# Patient Record
Sex: FEMALE | Race: WHITE | Employment: OTHER | URBAN - METROPOLITAN AREA
[De-identification: names, ages, dates, MRNs, and addresses within clinical notes are randomized per-mention and may not be internally consistent; named-entity substitution may affect disease eponyms.]

---

## 2017-07-25 ENCOUNTER — HOSPITAL ENCOUNTER (OUTPATIENT)
Dept: NON INVASIVE DIAGNOSTICS | Age: 47
Discharge: HOME | End: 2017-07-25
Attending: FAMILY MEDICINE | Admitting: FAMILY MEDICINE

## 2017-07-25 DIAGNOSIS — R06.01 ORTHOPNEA: ICD-10-CM

## 2017-07-25 LAB
LV EF: 58 %
LVEF MODALITY: NORMAL

## 2017-08-01 ENCOUNTER — HOSPITAL ENCOUNTER (OUTPATIENT)
Dept: CT IMAGING | Age: 47
Discharge: HOME | End: 2017-08-01
Attending: FAMILY MEDICINE | Admitting: FAMILY MEDICINE

## 2017-08-01 DIAGNOSIS — R22.0 LOCALIZED SWELLING, MASS, AND LUMP OF HEAD: ICD-10-CM

## 2017-08-11 PROBLEM — R59.0 CERVICAL LYMPHADENOPATHY: Status: ACTIVE | Noted: 2017-08-11

## 2017-08-31 ENCOUNTER — HOSPITAL ENCOUNTER (OUTPATIENT)
Dept: CT IMAGING | Age: 47
Discharge: HOME | End: 2017-08-31
Attending: INTERNAL MEDICINE | Admitting: INTERNAL MEDICINE

## 2017-08-31 DIAGNOSIS — R59.0 LOCALIZED ENLARGED LYMPH NODES: ICD-10-CM

## 2017-09-07 ENCOUNTER — HOSPITAL ENCOUNTER (OUTPATIENT)
Dept: NEUROLOGY | Age: 47
Discharge: HOME | End: 2017-09-07

## 2017-09-07 DIAGNOSIS — R68.89 OTHER ABNORMAL CLINICAL FINDING: ICD-10-CM

## 2017-09-07 DIAGNOSIS — S09.90XA HEAD INJURY, UNSPECIFIED: ICD-10-CM

## 2017-09-07 DIAGNOSIS — R68.89 OTHER GENERAL SYMPTOMS AND SIGNS: ICD-10-CM

## 2017-09-14 ENCOUNTER — HOSPITAL ENCOUNTER (OUTPATIENT)
Dept: CT IMAGING | Age: 47
Discharge: HOME | End: 2017-09-14
Attending: INTERNAL MEDICINE | Admitting: INTERNAL MEDICINE

## 2017-09-14 DIAGNOSIS — R59.0 LOCALIZED ENLARGED LYMPH NODES: ICD-10-CM

## 2017-09-19 ENCOUNTER — TELEPHONE (OUTPATIENT)
Dept: PULMONOLOGY | Age: 47
End: 2017-09-19

## 2017-09-26 ENCOUNTER — HOSPITAL ENCOUNTER (OUTPATIENT)
Dept: CT IMAGING | Age: 47
Discharge: HOME | End: 2017-09-26
Attending: INTERNAL MEDICINE | Admitting: INTERNAL MEDICINE

## 2017-09-26 DIAGNOSIS — R59.0 LOCALIZED ENLARGED LYMPH NODES: ICD-10-CM

## 2017-09-26 DIAGNOSIS — R59.0 CERVICAL LYMPHADENOPATHY: ICD-10-CM

## 2017-10-03 ENCOUNTER — TELEPHONE (OUTPATIENT)
Dept: PULMONOLOGY | Age: 47
End: 2017-10-03

## 2017-10-03 DIAGNOSIS — R91.1 PULMONARY NODULE: Primary | ICD-10-CM

## 2017-10-10 ENCOUNTER — HOSPITAL ENCOUNTER (OUTPATIENT)
Dept: OTHER | Age: 47
Discharge: HOME | End: 2017-10-10
Attending: INTERNAL MEDICINE | Admitting: INTERNAL MEDICINE

## 2017-10-10 NOTE — TELEPHONE ENCOUNTER
PET scan was cancelled and ynes for next Tues 10/17/17 d/t patient drank coffee. She is to keep her appt. Watch for PET results.

## 2017-10-12 ENCOUNTER — OFFICE VISIT (OUTPATIENT)
Dept: PULMONOLOGY | Age: 47
End: 2017-10-12

## 2017-10-12 DIAGNOSIS — R91.1 LUNG NODULE: Primary | ICD-10-CM

## 2017-10-12 DIAGNOSIS — Z72.0 TOBACCO ABUSE: ICD-10-CM

## 2017-10-12 DIAGNOSIS — J43.9 PULMONARY EMPHYSEMA, UNSPECIFIED EMPHYSEMA TYPE (HCC): ICD-10-CM

## 2017-10-12 PROCEDURE — 99245 OFF/OP CONSLTJ NEW/EST HI 55: CPT | Performed by: INTERNAL MEDICINE

## 2017-10-12 NOTE — PROGRESS NOTES
mouth daily, Disp: , Rfl:     naproxen (NAPROSYN) 500 MG tablet, Take 1 tablet by mouth 2 times daily (with meals) for 10 days, Disp: 20 tablet, Rfl: 0    Objective:   PHYSICAL EXAM:     VITALS:  /68 (Site: Left Arm, Position: Sitting, Cuff Size: Medium Adult)   Pulse 88   Temp 98.5 °F (36.9 °C) (Oral)   Resp 22 Comment: 24  Ht 5' 7\" (1.702 m)   Wt 259 lb (117.5 kg)   SpO2 96% Comment: on room air  BMI 40.57 kg/m²   Constitutional: In no acute distress. Appears stated age. Eyes: PERRL. No sclera icterus. No conjunctival injection. ENT: Oropharynx clear. Neck: Trachea midline. No thyroid tenderness. Lymph: No cervical LAD. No supraclavicular LAD. Resp: No accessory muscle use. No crackles. No wheezes. No rhonchi. CV: Regular rate. Regular rhythm. No murmur or rub. No lower extremity edema. GI: Abdomen non-tender. Non-distended. No masses. No organomegaly. Normal  bowel sounds. No umbilical hernia. Skin: Warm and dry. No nodules on exposed extremities. No rash on exposed extremities. Musc: No clubbing. No cyanosis. No synovitis or joint deformity in digits. Psych: Oriented x 3. Mood and affect normal. Intact judgment and insight. LABS:  The recent relevant labs were reviewed    PFTs Date  FVC  (%) FEV1  (%) FEV1/FVC ratio    TLC  (%)  RV  (%)   DLCO (%) Bronchodilator response:    IMAGING:  I personally reviewed and interpreted the following imaging today in the office:   9/26/17 Chest CT:  The lungs demonstrate changes of emphysema including bullae both lung apices. At the inferior aspect of the bullae on the left, in the paramediastinal left   upper lobe, there is a 3 x 2.3 cm mass (series 2, image 31).  It is unclear   on this noncontrast CT whether the mass is pulmonary abutting the pleura or   pleural-based.  The mass is superior to the aortic arch.  There are linear   type opacities extending inferiorly from the mass into the left upper lobe.    A 2 mm nodule is identified in the left upper lobe just anterior to the   fissure (series 3, image 34.  No suspicious lung nodules are identified in   the right lung.  The central airways are clear.  No pleural effusion is   identified.       Small mediastinal lymph nodes with with short axes less than 1 cm are   identified.  The heart size is normal.  No pericardial effusion is identified.          ASSESSMENT:  · MICK 2.3x3cm lung nodule: location would be difficult to reach by bronchoscopy  · Emphysema  · Tobacco abuse  · Morbid obesity    PLAN:   · PET CT   · PFT/6MWT  · Will adjust medications after PFT results  · Biopsy plan depending on results  · Tobacco cessation

## 2017-10-17 ENCOUNTER — HOSPITAL ENCOUNTER (OUTPATIENT)
Dept: OTHER | Age: 47
Discharge: HOME | End: 2017-10-17
Attending: INTERNAL MEDICINE | Admitting: INTERNAL MEDICINE

## 2017-10-17 VITALS — BODY MASS INDEX: 32.49 KG/M2 | HEIGHT: 67 IN | WEIGHT: 207 LBS

## 2017-10-17 DIAGNOSIS — R59.0 CERVICAL LYMPHADENOPATHY: ICD-10-CM

## 2017-10-17 RX ORDER — FLUDEOXYGLUCOSE F 18 200 MCI/ML
14.2 INJECTION, SOLUTION INTRAVENOUS
Status: COMPLETED | OUTPATIENT
Start: 2017-10-17 | End: 2017-10-17

## 2017-10-17 RX ADMIN — FLUDEOXYGLUCOSE F 18 14.2 MILLICURIE: 200 INJECTION, SOLUTION INTRAVENOUS at 07:09

## 2017-10-18 NOTE — TELEPHONE ENCOUNTER
PET/CT available for review. Was placed under Dr. Emely Boudreaux, so not routed to Dr. Tylor Bhandari. Please review. Pt is no currently scheduled for f/u with our office. Narrative   EXAMINATION:   WHOLE BODY PET/CT       10/17/2017       TECHNIQUE:   Following IV injection of 14.2 mCi of F 18 -FDG, PET  tumor imaging was   acquired from the base of the skull to the mid thighs.  Computed tomography   was used for purposes of attenuation correction and anatomic localization.    Fusion imaging was utilized for interpretation.       Uptake time 62 mins.  Glucose level 131 mg/dl.       COMPARISON:   None       HISTORY:   ORDERING PHYSICIAN PROVIDED HISTORY: Cervical lymphadenopathy       FINDINGS:   There is a nodular area of soft tissue density seen in the left para   tonsillar region, measuring 1.5 cm x 1.7 cm.  This is hypermetabolic, maximum   SUV measuring 7.97.  This is asymmetric when compared to the right       Small lymph node is seen within the neck soft tissues posteriorly on the   right.  This measures 9 mm.  This is not hypermetabolic       Left upper lobe -paramediastinal mass seen on recent CT scan is markedly   hypermetabolic.  Maximum SUV measures 16.59       Smaller punctate nodules in the left lung are below the resolution of PET.       Small mediastinal nodes are noted.  Representative lymph node in the   precarinal region measures 9 mm x 1.3 cm.  This is borderline hypermetabolic,   maximum SUV measuring 3.01       No hypermetabolic adrenal mass noted.       No hypermetabolic masses seen in abdomen or pelvis.  No hypermetabolic   retroperitoneal adenopathy.  Atherosclerotic change is seen in the aorta.       Moderate stool seen in the colon.  Appendix is identified and normal in   caliber.  Scattered areas of uptake are seen within the rectosigmoid colon,   favored to be physiologic       Degenerative changes are seen within the spine.           Impression   Asymmetric nodular area of hypermetabolism in the peritonsillar region on the   left.  Recommend direct visualization to rule out oropharyngeal carcinoma.       Focal paramediastinal mass is seen in the left lung apex, corresponding to   the CT findings.  This is hypermetabolic, suspicious for underlying   neoplastic etiology.  The mass blends imperceptibly with the mediastinum,   suggesting involvement of the mediastinum in addition to the left lung apex.       Borderline hypermetabolic mediastinal nodes

## 2017-10-18 NOTE — TELEPHONE ENCOUNTER
Please schedule for EBUS. No fluoro.  Wed or thursday at [facility]

## 2017-10-18 NOTE — TELEPHONE ENCOUNTER
Patient jerome for EBUS no fluoro 10/26/17 @ 11am arrive 10am; NPO. Patient informed. Does she need f/u? Order pended.

## 2017-10-25 VITALS
RESPIRATION RATE: 22 BRPM | OXYGEN SATURATION: 96 % | HEART RATE: 88 BPM | HEIGHT: 67 IN | TEMPERATURE: 98.5 F | DIASTOLIC BLOOD PRESSURE: 68 MMHG | WEIGHT: 208 LBS | BODY MASS INDEX: 32.65 KG/M2 | SYSTOLIC BLOOD PRESSURE: 108 MMHG

## 2017-10-25 NOTE — TELEPHONE ENCOUNTER
Spoke with pt, she cannot come Tuesday due to trick or treat with her grandchildren. Please advise when pt can be scheduled for bronch f/u.

## 2017-10-26 ENCOUNTER — HOSPITAL ENCOUNTER (OUTPATIENT)
Dept: SURGERY | Age: 47
Discharge: HOME | End: 2017-10-26
Attending: INTERNAL MEDICINE | Admitting: INTERNAL MEDICINE

## 2017-10-26 VITALS
RESPIRATION RATE: 18 BRPM | HEART RATE: 88 BPM | TEMPERATURE: 97.1 F | BODY MASS INDEX: 31.86 KG/M2 | DIASTOLIC BLOOD PRESSURE: 72 MMHG | WEIGHT: 203 LBS | HEIGHT: 67 IN | SYSTOLIC BLOOD PRESSURE: 103 MMHG | OXYGEN SATURATION: 92 %

## 2017-10-26 LAB
ALBUMIN SERPL-MCNC: 3.8 G/DL (ref 3.4–5)
ANION GAP SERPL CALCULATED.3IONS-SCNC: 12 MMOL/L (ref 3–16)
APPEARANCE BAL (LAVAGE): ABNORMAL
BASOPHILS ABSOLUTE: 0.1 K/UL (ref 0–0.2)
BASOPHILS RELATIVE PERCENT: 1 %
BUN BLDV-MCNC: 11 MG/DL (ref 7–20)
CALCIUM SERPL-MCNC: 9.2 MG/DL (ref 8.3–10.6)
CHLORIDE BLD-SCNC: 100 MMOL/L (ref 99–110)
CLOT EVALUATION BAL: ABNORMAL
CO2: 26 MMOL/L (ref 21–32)
COLOR LAVAGE: ABNORMAL
CREAT SERPL-MCNC: <0.5 MG/DL (ref 0.6–1.1)
EOSINOPHILS ABSOLUTE: 0.3 K/UL (ref 0–0.6)
EOSINOPHILS RELATIVE PERCENT: 3 %
EPITHELIAL CELLS FLUID: 32 %
GFR AFRICAN AMERICAN: >60
GFR NON-AFRICAN AMERICAN: >60
GLUCOSE BLD-MCNC: 113 MG/DL (ref 70–99)
GLUCOSE BLD-MCNC: 122 MG/DL (ref 70–99)
HCT VFR BLD CALC: 42 % (ref 36–48)
HEMOGLOBIN: 13.6 G/DL (ref 12–16)
INR BLD: 1.18 (ref 0.85–1.15)
LYMPHOCYTES ABSOLUTE: 2.1 K/UL (ref 1–5.1)
LYMPHOCYTES RELATIVE PERCENT: 20 %
MACROPHAGES, BAL: 60 % (ref 90–95)
MCH RBC QN AUTO: 28 PG (ref 26–34)
MCHC RBC AUTO-ENTMCNC: 32.4 G/DL (ref 31–36)
MCV RBC AUTO: 86.5 FL (ref 80–100)
MONOCYTES ABSOLUTE: 0.3 K/UL (ref 0–1.3)
MONOCYTES RELATIVE PERCENT: 3 %
NEUTROPHILS ABSOLUTE: 7.7 K/UL (ref 1.7–7.7)
NEUTROPHILS RELATIVE PERCENT: 73 %
NUMBER OF CELLS COUNTED BAL (LAVAGE): 200
PDW BLD-RTO: 13.4 % (ref 12.4–15.4)
PERFORMED ON: ABNORMAL
PHOSPHORUS: 2.9 MG/DL (ref 2.5–4.9)
PLATELET # BLD: 334 K/UL (ref 135–450)
PLATELET SLIDE REVIEW: ADEQUATE
PMV BLD AUTO: 8.8 FL (ref 5–10.5)
POTASSIUM SERPL-SCNC: 3.9 MMOL/L (ref 3.5–5.1)
PROTHROMBIN TIME: 13.3 SEC (ref 9.6–13)
RBC # BLD: 4.86 M/UL (ref 4–5.2)
RBC, BAL: 4300 /CUMM
SEGMENTED NEUTROPHILS, BAL: 8 % (ref 5–10)
SLIDE REVIEW: NORMAL
SODIUM BLD-SCNC: 138 MMOL/L (ref 136–145)
WBC # BLD: 10.5 K/UL (ref 4–11)
WBC/EPI CELLS BAL: 72 /CUMM

## 2017-10-26 PROCEDURE — 31652 BRONCH EBUS SAMPLNG 1/2 NODE: CPT | Performed by: INTERNAL MEDICINE

## 2017-10-26 PROCEDURE — 31624 DX BRONCHOSCOPE/LAVAGE: CPT | Performed by: INTERNAL MEDICINE

## 2017-10-26 PROCEDURE — 93010 ELECTROCARDIOGRAM REPORT: CPT | Performed by: INTERNAL MEDICINE

## 2017-10-26 RX ORDER — LIDOCAINE HYDROCHLORIDE 10 MG/ML
0.3 INJECTION, SOLUTION INFILTRATION; PERINEURAL
Status: ACTIVE | OUTPATIENT
Start: 2017-10-26 | End: 2017-10-26

## 2017-10-26 RX ORDER — SODIUM CHLORIDE 0.9 % (FLUSH) 0.9 %
10 SYRINGE (ML) INJECTION EVERY 12 HOURS SCHEDULED
Status: DISCONTINUED | OUTPATIENT
Start: 2017-10-26 | End: 2017-10-27 | Stop reason: HOSPADM

## 2017-10-26 RX ORDER — IPRATROPIUM BROMIDE AND ALBUTEROL SULFATE 2.5; .5 MG/3ML; MG/3ML
1 SOLUTION RESPIRATORY (INHALATION) ONCE
Status: COMPLETED | OUTPATIENT
Start: 2017-10-26 | End: 2017-10-26

## 2017-10-26 RX ORDER — LABETALOL HYDROCHLORIDE 5 MG/ML
5 INJECTION, SOLUTION INTRAVENOUS EVERY 10 MIN PRN
Status: DISCONTINUED | OUTPATIENT
Start: 2017-10-26 | End: 2017-10-27 | Stop reason: HOSPADM

## 2017-10-26 RX ORDER — DIPHENHYDRAMINE HYDROCHLORIDE 50 MG/ML
12.5 INJECTION INTRAMUSCULAR; INTRAVENOUS
Status: ACTIVE | OUTPATIENT
Start: 2017-10-26 | End: 2017-10-26

## 2017-10-26 RX ORDER — MORPHINE SULFATE 10 MG/ML
1 INJECTION, SOLUTION INTRAMUSCULAR; INTRAVENOUS EVERY 5 MIN PRN
Status: DISCONTINUED | OUTPATIENT
Start: 2017-10-26 | End: 2017-10-27 | Stop reason: HOSPADM

## 2017-10-26 RX ORDER — MEPERIDINE HYDROCHLORIDE 25 MG/ML
12.5 INJECTION INTRAMUSCULAR; INTRAVENOUS; SUBCUTANEOUS EVERY 5 MIN PRN
Status: DISCONTINUED | OUTPATIENT
Start: 2017-10-26 | End: 2017-10-27 | Stop reason: HOSPADM

## 2017-10-26 RX ORDER — PROMETHAZINE HYDROCHLORIDE 25 MG/ML
6.25 INJECTION, SOLUTION INTRAMUSCULAR; INTRAVENOUS
Status: ACTIVE | OUTPATIENT
Start: 2017-10-26 | End: 2017-10-26

## 2017-10-26 RX ORDER — HYDROCODONE BITARTRATE AND ACETAMINOPHEN 5; 325 MG/1; MG/1
1 TABLET ORAL EVERY 6 HOURS PRN
COMMUNITY
End: 2019-05-24 | Stop reason: ALTCHOICE

## 2017-10-26 RX ORDER — ONDANSETRON 2 MG/ML
4 INJECTION INTRAMUSCULAR; INTRAVENOUS
Status: ACTIVE | OUTPATIENT
Start: 2017-10-26 | End: 2017-10-26

## 2017-10-26 RX ORDER — SODIUM CHLORIDE 0.9 % (FLUSH) 0.9 %
10 SYRINGE (ML) INJECTION PRN
Status: DISCONTINUED | OUTPATIENT
Start: 2017-10-26 | End: 2017-10-27 | Stop reason: HOSPADM

## 2017-10-26 RX ORDER — SODIUM CHLORIDE, SODIUM LACTATE, POTASSIUM CHLORIDE, CALCIUM CHLORIDE 600; 310; 30; 20 MG/100ML; MG/100ML; MG/100ML; MG/100ML
INJECTION, SOLUTION INTRAVENOUS CONTINUOUS
Status: DISCONTINUED | OUTPATIENT
Start: 2017-10-26 | End: 2017-10-27 | Stop reason: HOSPADM

## 2017-10-26 RX ORDER — MORPHINE SULFATE 10 MG/ML
2 INJECTION, SOLUTION INTRAMUSCULAR; INTRAVENOUS EVERY 5 MIN PRN
Status: DISCONTINUED | OUTPATIENT
Start: 2017-10-26 | End: 2017-10-27 | Stop reason: HOSPADM

## 2017-10-26 RX ORDER — HYDRALAZINE HYDROCHLORIDE 20 MG/ML
5 INJECTION INTRAMUSCULAR; INTRAVENOUS EVERY 10 MIN PRN
Status: DISCONTINUED | OUTPATIENT
Start: 2017-10-26 | End: 2017-10-27 | Stop reason: HOSPADM

## 2017-10-26 RX ADMIN — IPRATROPIUM BROMIDE AND ALBUTEROL SULFATE 1 AMPULE: 2.5; .5 SOLUTION RESPIRATORY (INHALATION) at 13:31

## 2017-10-26 ASSESSMENT — PAIN DESCRIPTION - LOCATION
LOCATION: THROAT

## 2017-10-26 ASSESSMENT — PAIN DESCRIPTION - DESCRIPTORS
DESCRIPTORS: ACHING

## 2017-10-26 ASSESSMENT — PAIN - FUNCTIONAL ASSESSMENT: PAIN_FUNCTIONAL_ASSESSMENT: 0-10

## 2017-10-26 ASSESSMENT — PAIN SCALES - GENERAL
PAINLEVEL_OUTOF10: 3
PAINLEVEL_OUTOF10: 5
PAINLEVEL_OUTOF10: 3

## 2017-10-26 ASSESSMENT — LIFESTYLE VARIABLES: SMOKING_STATUS: 1

## 2017-10-26 NOTE — FLOWSHEET NOTE
Patient taken off oxygen to monitor saturation for home use. Previous attempts patient would be only saturation of 88-89% at the highest of 90%. Will continue to monitor.

## 2017-10-26 NOTE — PROGRESS NOTES
Spoke to Dr. Basia Sanchez and informed him that pt's SpO2 has been staying above 90%. Per Dr. Basia Sanchez, patient may use O2 prn.

## 2017-10-26 NOTE — PROGRESS NOTES
Discharge instructions reviewed with pt and family member, copy provided. Both verbalize an understanding.

## 2017-10-26 NOTE — PROGRESS NOTES
Taken patient off vital signs monitoring except for oxygen saturation. Patient is able to go home and chest x ray negative for post procedure pneumothorax. Family at beside. Discussed care with  and it was stated [facility] would arrive around 4:30-5 pm.  Alerted family and patient. Provided service recovery and reassurance. Discussed care of patient with endo call team. Patient iv removed, and patient dressed. Call placed to Dr kirkland concerning needs dicussed by , orders provided. Awaiting discharge instructions until after [facility] arrives. Will continue to monitor.

## 2017-10-26 NOTE — PROGRESS NOTES
Patient resting in bed with no acute signs of distress. Vital signs stable. Side rails up x2. Family visited patient at bedside and left to go to interview. Encouraged family to return at 5 to 0 for discharge. Family left her cell number; lizzy at [phone]. Will continue to monitor.

## 2017-10-26 NOTE — PROCEDURES
The risks and benefits as well as alternatives to the procedure have been discussed with the patient and or family. The patient and or next of kin understands and agrees to proceed. Signed Consent in chart. PROCEDURE: Fiberoptic bronchoscopy with endobronchial ultrasound-guided biopsy of lymph nodes    DESCRIPTION OF PROCEDURE: Informed consent was obtained from the patient after explaining the risks and benefits. A time out was taken. General or total intravenous anesthesia was kindly provided by the anesthetist.     The scope was passed with ease via the ETT. Direct visualization of the lymph nodes was obtained using endobronchial ultrasound (EBUS) guidance. A complete ultrasound lymph node exam was performed for lymph node stations 2, 4, 7, 10 and 11. The following lymph nodes were subjected to EBUS guided biopsy using standard technique and in the following order:    1. Precarinal was enlarged about 1-1.5cm  2. Subcarinal - was small about 5mm  No hilar adenopathy, largest was <5 mm    MENDY confirmed lymphocytes present on biopsy    Subsequently, a standard bronchoscope was used to perform a complete airway inspection. 1.  Washings were obtained from throughout the airways. 2. A bronchoalveolar lavage was obtained from the MICK apicoposterior segment    The patient tolerated the procedure well. EBL <10cc. Recovery will be per the anesthesia team.      FOLLOW UP:  is with me in approximately three to five days. Patient is instructed to call with concerns and if follow up has not already been scheduled. In the event of severe symptoms or if the patient is unable to reach my office, instructions are given to proceed to the emergency department.

## 2017-10-26 NOTE — PROGRESS NOTES
Pt up to use restroom without O2 on. Upon returning to recovery bay, pt sitting up in chair. SpO2 currently 91%. Texting on phone.

## 2017-10-26 NOTE — ANESTHESIA PRE-OP
Department of Anesthesiology  Preprocedure Note       Name:  Yarelis Sanchez   Age:  40 y.o.  :  1970                                          MRN:  [id number]         Date:  10/26/2017      Surgeon:    Procedure:    Medications prior to admission:   Prior to Admission medications    Medication Sig Start Date End Date Taking? Authorizing Provider   HYDROcodone-acetaminophen (NORCO) 5-325 MG per tablet Take 1 tablet by mouth every 6 hours as needed for Pain . Yes Historical Provider, MD       Current medications:    Current Outpatient Prescriptions   Medication Sig Dispense Refill    HYDROcodone-acetaminophen (NORCO) 5-325 MG per tablet Take 1 tablet by mouth every 6 hours as needed for Pain .        Current Facility-Administered Medications   Medication Dose Route Frequency Provider Last Rate Last Dose    lactated ringers infusion   Intravenous Continuous Elsy Cobian MD        sodium chloride flush 0.9 % injection 10 mL  10 mL Intravenous 2 times per day Elsy Cobian MD        sodium chloride flush 0.9 % injection 10 mL  10 mL Intravenous PRN Elsy Cobian MD        lidocaine 1 % injection 0.3 mL  0.3 mL Intradermal Once PRN Elsy Cobian MD        HYDROmorphone (DILAUDID) injection 0.25 mg  0.25 mg Intravenous Q5 Min PRN Federico Quijano MD        HYDROmorphone (DILAUDID) injection 0.5 mg  0.5 mg Intravenous Q5 Min PRN Federico Quijano MD        morphine injection 1 mg  1 mg Intravenous Q5 Min PRN Federico Quijano MD        morphine (PF) injection 2 mg  2 mg Intravenous Q5 Min PRN Federico Quijano MD        diphenhydrAMINE (BENADRYL) injection 12.5 mg  12.5 mg Intravenous Once PRN Federico Quijano MD        ondansetron [facility]) injection 4 mg  4 mg Intravenous Once PRN Federico Quijano MD        promethazine [facility]) injection 6.25 mg  6.25 mg Intravenous Once PRN Federico Quijano MD        labetalol (NORMODYNE;TRANDATE) injection 5 mg  5 mg Intravenous Q10 Date of last liquid consumption: 10/25/17                        Date of last solid food consumption: 10/25/17    BMI:   Wt Readings from Last 3 Encounters:   10/26/17 203 lb (92.1 kg)   10/17/17 207 lb (93.9 kg)   10/12/17 208 lb (94.3 kg)     Body mass index is 31.79 kg/m². Anesthesia Evaluation  Patient summary reviewed and Nursing notes reviewed no history of anesthetic complications:   Airway: Mallampati: II  TM distance: >3 FB   Neck ROM: full  Mouth opening: > = 3 FB Dental:    (+) edentulous      Pulmonary:negative ROS and normal exam    (+) COPD:  sleep apnea:                             Cardiovascular:negative ROS                      Neuro/Psych:   {neg ROS  (+) seizures:, CVA: residual symptoms,             GI/Hepatic/Renal: neg ROS       (-) hiatal hernia and GERD       Endo/Other: negative ROS   (+) Type II DM, , hypothyroidism::.                 Abdominal:   (+) obese,         Vascular:                                   Pre-Operative Diagnosis: PULMONARY NODULE    40 y.o.   BMI:  Body mass index is 31.79 kg/m².      Vitals:    10/26/17 0943   BP: 138/79   Pulse: 89   Resp: 20   Temp: 98.4 °F (36.9 °C)   TempSrc: Temporal   SpO2: 94%   Weight: 203 lb (92.1 kg)   Height: 5' 7\" (1.702 m)       Allergies   Allergen Reactions    Amoxicillin Anaphylaxis    Pcn [Penicillins] Other (See Comments)     Pt states that she was in a coma for 5 days    Iv Dye [Iodides]     Percocet [Oxycodone-Acetaminophen]     Tramadol     Tylenol With Codeine [Acetaminophen-Codeine]      Pt states she can take codeine, and take tylenol-just cant take the combination       Social History   Substance Use Topics    Smoking status: Current Every Day Smoker     Packs/day: 0.50     Types: Cigarettes    Smokeless tobacco: Never Used      Comment: 36 years smoker    Alcohol use Yes      Comment: rarely       LABS:    CBC  Lab Results   Component Value Date/Time    WBC 10.6 (H) 08/21/2017 01:15 PM    WBC 9.7 12/02/2015 11:00 PM    HGB 15.1 08/21/2017 01:15 PM    HCT 47.4 (H) 08/21/2017 01:15 PM    .0 08/21/2017 01:15 PM     RENAL  Lab Results   Component Value Date/Time     08/21/2017 01:15 PM    K 4.3 08/21/2017 01:15 PM     08/21/2017 01:15 PM    CO2 29 08/21/2017 01:15 PM    BUN 8 08/21/2017 01:15 PM    CREATININE 0.8 08/21/2017 01:15 PM    GLUCOSE 192 (H) 08/21/2017 01:15 PM     COAGS  No results found for: PROTIME, INR, APTT     Anesthesia Plan      general     ASA 3     (I discussed with the patient the risks and benefits of PIV, general anesthesia, IV Narcotics, PACU. All questions were answered the patient agrees with the plan.)  Induction: intravenous. Anesthetic plan and risks discussed with patient. Plan discussed with CRNA.                   Pilar Juarez MD   10/26/2017

## 2017-10-26 NOTE — PROGRESS NOTES
The patient was seen by me today after the bronchoscopy and she admitted to [facility]. She states she was told in the past she may need O2. She does have emphysema. She will need to home with O2 at 2lpm by NC continuous, and she agreed to this.

## 2017-10-26 NOTE — H&P
See History and Physical from 10/12/17 for additional findings:    Allergies and medications have been reviewed    HENT: Airway patent and reviewed. Mallampati 3  Cardiovascular: Normal rate, regular rhythm, normal heart sounds. Pulmonary/Chest: No wheezes. No rhonchi. No rales. Abdominal: Soft. Bowel sounds are normal. No distension. ASA Class: Class 3 - A patient with severe systemic disease that limits activity but is not incapacitating  Level of Sedation Plan:   Sedation per anesthesia    Post Procedure Plan   Discharge home or return to same level of care per post-procedure recovery protocol   ______________________     The risks and benefits as well as alternatives to the procedure have been discussed with the patient and or family. The patient and or next of kin understands and agrees to proceed. Signed Consent in chart.

## 2017-10-26 NOTE — PROGRESS NOTES
Patient is unable to keep oxygen above 88-89% on room air. Set patient up on side of bed and saturation unable to go above 89%-90% on room air. Placed patient back on oxygen. Call placed to Dr major of post care after breathing treatment and unable to get saturation up past 90% on room air. Call placed to social work. Awaiting chest x ray results. Will continue to monitor.

## 2017-10-28 LAB
CULTURE, RESPIRATORY: NORMAL
GRAM STAIN RESULT: NORMAL

## 2017-10-30 LAB
EKG ATRIAL RATE: 78 BPM
EKG DIAGNOSIS: NORMAL
EKG P AXIS: 78 DEGREES
EKG P-R INTERVAL: 160 MS
EKG Q-T INTERVAL: 402 MS
EKG QRS DURATION: 90 MS
EKG QTC CALCULATION (BAZETT): 458 MS
EKG R AXIS: 97 DEGREES
EKG T AXIS: 77 DEGREES
EKG VENTRICULAR RATE: 78 BPM

## 2017-10-31 ENCOUNTER — OFFICE VISIT (OUTPATIENT)
Dept: PULMONOLOGY | Age: 47
End: 2017-10-31

## 2017-10-31 VITALS
SYSTOLIC BLOOD PRESSURE: 101 MMHG | HEART RATE: 91 BPM | OXYGEN SATURATION: 96 % | DIASTOLIC BLOOD PRESSURE: 73 MMHG | TEMPERATURE: 98.4 F | RESPIRATION RATE: 20 BRPM | BODY MASS INDEX: 31.89 KG/M2 | HEIGHT: 67 IN | WEIGHT: 203.2 LBS

## 2017-10-31 DIAGNOSIS — R91.1 LUNG NODULE: Primary | ICD-10-CM

## 2017-10-31 DIAGNOSIS — R59.0 MEDIASTINAL ADENOPATHY: ICD-10-CM

## 2017-10-31 DIAGNOSIS — Z72.0 TOBACCO ABUSE: ICD-10-CM

## 2017-10-31 DIAGNOSIS — J43.9 PULMONARY EMPHYSEMA, UNSPECIFIED EMPHYSEMA TYPE (HCC): ICD-10-CM

## 2017-10-31 PROCEDURE — 3023F SPIROM DOC REV: CPT | Performed by: INTERNAL MEDICINE

## 2017-10-31 PROCEDURE — G8484 FLU IMMUNIZE NO ADMIN: HCPCS | Performed by: INTERNAL MEDICINE

## 2017-10-31 PROCEDURE — G8427 DOCREV CUR MEDS BY ELIG CLIN: HCPCS | Performed by: INTERNAL MEDICINE

## 2017-10-31 PROCEDURE — G8926 SPIRO NO PERF OR DOC: HCPCS | Performed by: INTERNAL MEDICINE

## 2017-10-31 PROCEDURE — G8417 CALC BMI ABV UP PARAM F/U: HCPCS | Performed by: INTERNAL MEDICINE

## 2017-10-31 PROCEDURE — 4004F PT TOBACCO SCREEN RCVD TLK: CPT | Performed by: INTERNAL MEDICINE

## 2017-10-31 PROCEDURE — 99214 OFFICE O/P EST MOD 30 MIN: CPT | Performed by: INTERNAL MEDICINE

## 2017-10-31 NOTE — TELEPHONE ENCOUNTER
Pt called asking if bronch results are in, advised pt that results are in, but she would need to see Dr. Speedy Pardo to discuss results. Pt was offered an appt for today, but she did not want that, pt stated that Dr. Rafael Fenton office called pt yesterday and stated that pt needs to be scheduled for another biopsy, and pt was upset and didn't understand why another biopsy needed to be done, and chose to use foul language to Dr. Rafael Fenton staff, and now pt wants to find a new Oncologist and new PCP. Pt asking if Dr. Speedy Pardo has any recommendations of new physicians for pt to see?

## 2017-10-31 NOTE — PROGRESS NOTES
[facility] Pulmonary, Critical Care, and Sleep    Outpatient Follow Up Note    Chief Complaint: lung mass  Consulting provider: Chris Ram MD    Interval History: 40 y.o. female She tolerated bronchoscopy well. She had mild hemoptyis for 2 days that resolved. She is concerned that she needs both her throat looked at and the mass biopsied. Initial HPI:   The patient has a history of smoking and SOB. A recent chest CT showed a RUL paramediastinal 2x3cm nodule. The patient does not have SOB at rest but has SPARKS. Exercise tolerance on level ground is < 1 block. + daily intermittent cough that is usually dry. +wheezes intermittently. Current Outpatient Prescriptions:     HYDROcodone-acetaminophen (NORCO) 5-325 MG per tablet, Take 1 tablet by mouth every 6 hours as needed for Pain ., Disp: , Rfl:   Objective:   PHYSICAL EXAM: /73   Pulse 91   Temp 98.4 °F (36.9 °C) (Oral)   Resp 20   Ht 5' 7\" (1.702 m)   Wt 203 lb 3.2 oz (92.2 kg)   SpO2 96% Comment: 2 liters  BMI 31.83 kg/m²    Constitutional:  No acute distress. Eyes: PERRL. Conjunctivae anicteric. ENT: Normal nose. Normal tongue. Oropharynx clear. Neck:  Trachea is midline. No thyroid tenderness. Respiratory: No accessory muscle usage. decreased breath sounds. No wheezes. No rales. No Rhonchi. Cardiovascular: Normal S1S2. No digit clubbing. No digit cyanosis. No LE edema. Psychiatric: No anxiety or Agitation. Alert and Oriented to person, place and time. LABS:  Reviewed any pertinent new labs that are available. 10/26/17 A. Precarinal Lymph Node, Transbronchial Fine Needle Aspirate:       -  No malignant cells identified.       B. Subcarinal Lymph Node, Transbronchial Fine Needle Aspirate:       -  No malignant cells identified.     PFTs   FVC  (%) FEV1  (%) FEV1/FVC ratio    TLC  (%)  RV  (%)   DLCO (%) Bronchodilator response:    IMAGING:  I personally reviewed and interpreted the following today in the office:   IMAGING:  I

## 2017-11-01 ENCOUNTER — HOSPITAL ENCOUNTER (OUTPATIENT)
Dept: OTHER | Age: 47
Discharge: HOME | End: 2017-11-01
Attending: INTERNAL MEDICINE | Admitting: INTERNAL MEDICINE

## 2017-11-01 ENCOUNTER — HOSPITAL ENCOUNTER (OUTPATIENT)
Dept: CT IMAGING | Age: 47
Discharge: HOME | End: 2017-11-01
Admitting: INTERNAL MEDICINE

## 2017-11-01 ENCOUNTER — TELEPHONE (OUTPATIENT)
Dept: PULMONOLOGY | Age: 47
End: 2017-11-01

## 2017-11-01 VITALS
TEMPERATURE: 99.2 F | DIASTOLIC BLOOD PRESSURE: 69 MMHG | HEIGHT: 67 IN | OXYGEN SATURATION: 91 % | BODY MASS INDEX: 31.86 KG/M2 | WEIGHT: 203 LBS | SYSTOLIC BLOOD PRESSURE: 102 MMHG | RESPIRATION RATE: 18 BRPM | HEART RATE: 92 BPM

## 2017-11-01 DIAGNOSIS — R59.0 LOCALIZED ENLARGED LYMPH NODES: ICD-10-CM

## 2017-11-01 DIAGNOSIS — R59.0 HILAR ADENOPATHY: ICD-10-CM

## 2017-11-01 LAB
GLUCOSE BLD-MCNC: 120 MG/DL (ref 70–99)
INR BLD: 1.18 (ref 0.85–1.15)
PERFORMED ON: ABNORMAL
PROTHROMBIN TIME: 13.3 SEC (ref 9.6–13)

## 2017-11-01 RX ORDER — SODIUM CHLORIDE 9 MG/ML
INJECTION, SOLUTION INTRAVENOUS ONCE
Status: DISCONTINUED | OUTPATIENT
Start: 2017-11-01 | End: 2017-11-02 | Stop reason: HOSPADM

## 2017-11-01 RX ORDER — IBUPROFEN 200 MG
400 TABLET ORAL
Status: ACTIVE | OUTPATIENT
Start: 2017-11-01 | End: 2017-11-01

## 2017-11-01 ASSESSMENT — PAIN SCALES - GENERAL: PAINLEVEL_OUTOF10: 7

## 2017-11-01 ASSESSMENT — PAIN DESCRIPTION - ONSET: ONSET: GRADUAL

## 2017-11-01 ASSESSMENT — PAIN DESCRIPTION - ORIENTATION: ORIENTATION: LEFT

## 2017-11-01 ASSESSMENT — PAIN DESCRIPTION - PAIN TYPE: TYPE: CHRONIC PAIN

## 2017-11-01 ASSESSMENT — PAIN DESCRIPTION - LOCATION: LOCATION: CHEST;HEAD

## 2017-11-01 ASSESSMENT — PAIN DESCRIPTION - DESCRIPTORS: DESCRIPTORS: ACHING

## 2017-11-01 NOTE — PROGRESS NOTES
ENDOSCOPY PRE, INTRA, & POST PROCEDURAL CARE PLAN     HEMODYNAMIC STATUS  INTERDISCIPLINARY    Goal: Hemodynamic Status to Baseline / Discharge Criteria Met  Interventions        1.  Obtain Patient Medical / Surgical History        1  Assess & Review Allergies Prior to Endo & All Meds (PRN)        2.  Assess / Monitor Vital Signs / LOC (PRN).  Intra Procedure VS/ LOC Q5 Mins & As Per Policy Until Discharge.        3.  Obtain Baseline Naomi & Post Procedural Naomi Per Policy        4.  Check Monitors, Alarms On        5.  Patient Re Assessed by Physician        6.  Monitor I&O Post Procedure    NURSING    SAFETY & PSYCHO SOCIAL  INTERDISCIPLINARY    Goal: Patient Returns to Baseline Activity/ Meets Discharge Criteria  Interventions        1.   Greet Patient with ID Badge/ Picture in View (PRN)        2.  Be Available & Sensitive to Patients Needs (PRN)        3.   Communicate referral to Pastoral Care as Appropriate (PRN)        4.   Provide Age Specific Measures (PRN)        4.   Admission Data Base Reviewed        5.   Administer Meds Per Orders (PRN)        5.   Maintain Baseline Activity Or Activity as Ordered Per Physician       NUTRITION    INTERDISCIPLINARY    Goal: Patient to baseline/ Improved Nutrition  Interventions        1.   Assess NPO Status / Notify Physician if Necessary (PRN)        2.   NPO per Physician Bello Quince       3.  Assess Nutritional Status (PRN)        4.   Assess Ability to Swallow as Indicated       LAB & DIAGNOSTICS    Goal: Additional Tests per Physicians Orders  Interventions        1.   Lab & Diagnostics per Physician Orders (PRN)        2.   Obtain Urine / Serum HCG & FSBS On All Diabetic Patients Per Policy & (PRN)        3.   Assess Lab Time Out for Patient Safety as Needed (PRN)    RESPIRATORY  INTERDISCIPLINARY    Goal: Airway Patency, SAO2 Saturation, Cough mechanism Maintained    Interventions        1.  Evaluate Bilateral Breath Sounds Baseline, (PRN), & Prior to Discharge        2.  Weight & Height Noted ( PRN)        3.   Assess Baseline SPo2 (PRN)         4.  Monitor SAO2 Continuously During Sedation/ Analgesia & Until Patient Meets D/C Criteria.        5.  Resuscitation Equipment Available (PRN)    GASTROINTESTINAL  INTERDISCIPLINARY    Goal: Bowel Sounds Maintained   Interventions        1.  Evaluate Baseline Bowel Sounds, (PRN), & Prior to [address]       2.  Monitor Abdominal status of Patient Throughout Procedure       KNOWLEDGE DEFICIT, EDUCATION, DISCHARGE PLAN    INTERDISCIPLINARY    Patient / SO verbalize Understanding Of Procedural discharge Instructions  Interventions        1.   Obtain Informed Consent (PRN)         2.   Initiate ENDO Plan of Care, Answer Questions (PRN)         3.  Assess Patients Ability to Understand Information (PRN)        3.   Discharge Planning ; Assure Presence of  upon Patient [address]       4.   Education / Communication Ongoing As Appropriate        5.  Keep Families Aware of Delays As Appropriate        6.  Reinforce Discharge Teaching / Post Procedure Instructions (PRN)    PAIN MANAGEMENT  INTERDISCIPLINARY    Goal:Patient Return to Pre Procedure Comfort  Interventions        1.   Assess Baseline Pain Level (PRN)        2.   Intra Procedure ; Evaluation & Assessment Of Pain is Ongoing        3.  Post procedure; Assess Pain Level Once Awake/ Prior To Discharge        2.   Administer Analgesics as Ordered (PRN)         3.   Assess Effectiveness of Pain Management (PRN)           Re-Assess Patient after all Interventions.  Assess Pain Level 30 - 60 Minutes After Pain Management Intervention.        4.  Provide Discharge Teaching

## 2017-11-01 NOTE — PROGRESS NOTES
RADIOLOGY:  Patient scheduled for biopsy of mediastinal/periaortic mass. After discussing the risks and benefits of the procedure, the patient opted to decline the biopsy. Patient will be transported back to the [facility] to be discharged.

## 2017-11-01 NOTE — PROGRESS NOTES
Patient was a canceled procedure. No sedation given. No need for discharge instruction due to cancellation. Encouraged patient to follow up with primary care physician. Called patient family and they are on their way. Will continue to monitor.

## 2017-11-02 ENCOUNTER — TELEPHONE (OUTPATIENT)
Dept: CARDIOTHORACIC SURGERY | Age: 47
End: 2017-11-02

## 2017-11-02 NOTE — TELEPHONE ENCOUNTER
Spoke with patient states after talking with IR yesterday before procedure was not sure if she wanted to have CT guided bio done. States she has appt with Dr Munguia Seek on 11/6/17 to discuss will office on Monday after appt to see what she has decided to do.

## 2017-11-02 NOTE — TELEPHONE ENCOUNTER
Received referral from Dr. Bo Dillon. I called the patient today. She states that she is very overwhelmed and feels she is getting mixed information from different doctors. She has appt with Dr. Bo Dillon on Monday to further clarify her concerns. I offered her appt with Dr. Nii Melgar on Wednesday but gave her the option to cancel if she decides on a different course of action. She is agreeable to this. I emailed her our address, phone number, and appt information.

## 2017-11-06 ENCOUNTER — HOSPITAL ENCOUNTER (OUTPATIENT)
Dept: PULMONOLOGY | Age: 47
Discharge: HOME | End: 2017-11-06
Attending: INTERNAL MEDICINE | Admitting: INTERNAL MEDICINE

## 2017-11-06 DIAGNOSIS — R91.1 SOLITARY PULMONARY NODULE: ICD-10-CM

## 2017-11-06 RX ORDER — ALBUTEROL SULFATE 2.5 MG/3ML
2.5 SOLUTION RESPIRATORY (INHALATION) ONCE
Status: COMPLETED | OUTPATIENT
Start: 2017-11-06 | End: 2017-11-06

## 2017-11-06 RX ADMIN — ALBUTEROL SULFATE 2.5 MG: 2.5 SOLUTION RESPIRATORY (INHALATION) at 12:03

## 2017-11-07 NOTE — PROCEDURES
[address]                [address] [address]                              PULMONARY FUNCTION    PATIENT NAME: Indira Agustin                        :         1970  MED REC NO:   [id number]                          ROOM:  ACCOUNT NO:   [de-identified]                          ADMIT DATE:  2017  PROVIDER:     Ellen Pa MD    DATE OF PROCEDURE:  2017    INDICATION:  Emphysema. RESULTS:  1. Spirometry revealed evidence of severe obstructive defect. FEV1  is 1.09 L, which is 34% of predicted. There is a borderline response  to bronchodilators and FEV1/FVC ratio of 57%. 2.  Lung volume revealed normal total lung capacity 5.59 L, which is  96% of predicted. Evidence of air trapping with residual volume is  3.6 L, which is 200% of predicted. 3.  Diffusion capacity is severely decreased at 12.47, which is 47% of  predicted. 4.  Flow volume loops consistent with obstructive defect. 5.  Six-minute walk done per [facility] Protocol. The  patient was able to walk 160 feet. Saturation on room air at rest was  97% with a heart rate of 85. No significant desaturation on exertion. Max heart rate is 91. The patient stopped due to back pain. CONCLUSIONS:  1.  Very severe obstructive defect with borderline response to  bronchodilators, air trapping and severely decreased diffusion  capacity. 2.  Six-minute walk submaximal due to back pain with no significant  desaturation.         Omero Saxena MD    D: 2017 13:46:27       T: 2017 21:48:41     [email or web address]  Job#: [id number]     Doc#: [id number]

## 2017-11-13 ENCOUNTER — HOSPITAL ENCOUNTER (OUTPATIENT)
Dept: OTHER | Age: 47
Discharge: HOME | End: 2017-11-13
Attending: INTERNAL MEDICINE | Admitting: INTERNAL MEDICINE

## 2017-11-13 ENCOUNTER — HOSPITAL ENCOUNTER (OUTPATIENT)
Dept: CT IMAGING | Age: 47
Discharge: HOME | End: 2017-11-13

## 2017-11-13 VITALS
SYSTOLIC BLOOD PRESSURE: 117 MMHG | HEART RATE: 87 BPM | TEMPERATURE: 98 F | DIASTOLIC BLOOD PRESSURE: 76 MMHG | WEIGHT: 201 LBS | BODY MASS INDEX: 31.55 KG/M2 | OXYGEN SATURATION: 92 % | RESPIRATION RATE: 14 BRPM | HEIGHT: 67 IN

## 2017-11-13 VITALS
SYSTOLIC BLOOD PRESSURE: 101 MMHG | OXYGEN SATURATION: 95 % | DIASTOLIC BLOOD PRESSURE: 56 MMHG | HEART RATE: 81 BPM | RESPIRATION RATE: 20 BRPM

## 2017-11-13 DIAGNOSIS — R59.0 LOCALIZED ENLARGED LYMPH NODES: ICD-10-CM

## 2017-11-13 DIAGNOSIS — R59.0 CERVICAL LYMPHADENOPATHY: ICD-10-CM

## 2017-11-13 PROBLEM — J93.9 PNEUMOTHORAX: Status: ACTIVE | Noted: 2017-11-13

## 2017-11-13 LAB
INR BLD: 1.17 (ref 0.85–1.15)
PLATELET # BLD: 348 K/UL (ref 135–450)
PROTHROMBIN TIME: 13.2 SEC (ref 9.6–13)

## 2017-11-13 RX ORDER — FENTANYL CITRATE 50 UG/ML
INJECTION, SOLUTION INTRAMUSCULAR; INTRAVENOUS
Status: COMPLETED | OUTPATIENT
Start: 2017-11-13 | End: 2017-11-13

## 2017-11-13 RX ORDER — MIDAZOLAM HYDROCHLORIDE 5 MG/ML
INJECTION INTRAMUSCULAR; INTRAVENOUS
Status: COMPLETED | OUTPATIENT
Start: 2017-11-13 | End: 2017-11-13

## 2017-11-13 RX ORDER — PROMETHAZINE HYDROCHLORIDE 25 MG/ML
12.5 INJECTION, SOLUTION INTRAMUSCULAR; INTRAVENOUS ONCE
Status: COMPLETED | OUTPATIENT
Start: 2017-11-13 | End: 2017-11-13

## 2017-11-13 RX ADMIN — FENTANYL CITRATE 50 MCG: 50 INJECTION, SOLUTION INTRAMUSCULAR; INTRAVENOUS at 17:07

## 2017-11-13 RX ADMIN — PROMETHAZINE HYDROCHLORIDE 12.5 MG: 25 INJECTION, SOLUTION INTRAMUSCULAR; INTRAVENOUS at 17:06

## 2017-11-13 RX ADMIN — MIDAZOLAM HYDROCHLORIDE 1 MG: 5 INJECTION INTRAMUSCULAR; INTRAVENOUS at 09:37

## 2017-11-13 RX ADMIN — MIDAZOLAM HYDROCHLORIDE 1 MG: 5 INJECTION INTRAMUSCULAR; INTRAVENOUS at 17:07

## 2017-11-13 RX ADMIN — FENTANYL CITRATE 50 MCG: 50 INJECTION, SOLUTION INTRAMUSCULAR; INTRAVENOUS at 09:17

## 2017-11-13 RX ADMIN — FENTANYL CITRATE 50 MCG: 50 INJECTION, SOLUTION INTRAMUSCULAR; INTRAVENOUS at 09:37

## 2017-11-13 RX ADMIN — MIDAZOLAM HYDROCHLORIDE 1 MG: 5 INJECTION INTRAMUSCULAR; INTRAVENOUS at 09:17

## 2017-11-13 ASSESSMENT — PAIN - FUNCTIONAL ASSESSMENT: PAIN_FUNCTIONAL_ASSESSMENT: 0-10

## 2017-11-13 ASSESSMENT — PAIN DESCRIPTION - DESCRIPTORS: DESCRIPTORS: CONSTANT

## 2017-11-13 NOTE — PRE SEDATION
Sedation Pre-Procedure Note    Patient Name: Jocelyne Gandara   YOB: 1970  Room/Bed: Room/bed info not found  Medical Record Number: [id number]  Date: 11/13/2017   Time: 8:24 AM       Indication:  MICK paramediastinal lung nodule    Consent: I have discussed with the patient and/or the patient representative the indication, alternatives, and the possible risks and/or complications of the planned procedure and the anesthesia methods. The patient and/or patient representative appear to understand and agree to proceed. Vital Signs: There were no vitals filed for this visit. Past Medical History:   has a past medical history of Anemia; Depression; Emphysema; Emphysema of lung ([facility] Hyperlipidemia; Hypoglycemia; Ovarian cancer ([facility] [address] Psychiatric problem; Seizures ([facility] [address] and Uterine cancer ([facility] [address] Past Surgical History:   has a past surgical history that includes Hysterectomy and back surgery. Medications:   Scheduled Meds:   Continuous Infusions:   PRN Meds:   Home Meds:   Prior to Admission medications    Medication Sig Start Date End Date Taking? Authorizing Provider   OXYGEN Inhale into the lungs 2l as needed    Historical Provider, MD   tiotropium (SPIRIVA RESPIMAT) 2.5 MCG/ACT AERS inhaler Inhale 2 puffs into the lungs daily 10/31/17 11/30/17  Mark Daigle MD   HYDROcodone-acetaminophen (NORCO) 5-325 MG per tablet Take 1 tablet by mouth every 6 hours as needed for Pain . Historical Provider, MD     Coumadin Use Last 7 Days:  no  Antiplatelet drug therapy use last 7 days: no  Other anticoagulant use last 7 days: no  Additional Medication Information:  n/a      Pre-Sedation Documentation and Exam:   I have reviewed the patient's history and review of systems.     Mallampati Airway Assessment:  Mallampati Class II - (soft palate, fauces & uvula are visible)    Prior History of Anesthesia Complications:   none    ASA Classification:  Class 3 - A patient with severe systemic disease that limits activity but is not incapacitating    Sedation/ Anesthesia Plan:   intravenous sedation    Medications Planned:   midazolam (Versed) intravenously and fentanyl intravenously    Patient is an appropriate candidate for plan of sedation: yes    Electronically signed by Angela Hawley MD on 11/13/2017 at 8:24 AM

## 2017-11-13 NOTE — BRIEF OP NOTE
Brief Postoperative Note    Aldo Hunter  YOB: 1970  [id number]    Pre-operative Diagnosis: MICK lung mass    Post-operative Diagnosis: Same    Procedure: CT guided biopsy    Anesthesia: Local and Moderate Sedation    Surgeons/Assistants: Luciano    Estimated Blood Loss: less than 50     Complications: None    Specimens: Was Obtained: biopsies of MICK mass    Findings: MICK mass biopsied with 21 gauge needle x 3. Immediate post images show no pneumothorax.     Electronically signed by Natalya Giang MD on 11/13/2017 at 10:04 AM

## 2017-11-13 NOTE — SEDATION DOCUMENTATION
8FR left chest tube placement successful. Patient tolerated procedure well. Returned to prealdrete score. No further complaints of nausea. Report called to RN C4. Transferred to 443 per stretcher.

## 2017-11-14 PROBLEM — J95.811 IATROGENIC PNEUMOTHORAX: Status: ACTIVE | Noted: 2017-11-13

## 2017-11-14 PROBLEM — J98.11 ATELECTASIS: Status: ACTIVE | Noted: 2017-11-14

## 2017-11-14 PROBLEM — J43.1 PANLOBULAR EMPHYSEMA (HCC): Status: ACTIVE | Noted: 2017-11-14

## 2017-11-21 ENCOUNTER — TELEPHONE (OUTPATIENT)
Dept: PULMONOLOGY | Age: 47
End: 2017-11-21

## 2017-11-27 ENCOUNTER — HOSPITAL ENCOUNTER (OUTPATIENT)
Dept: CT IMAGING | Age: 47
Discharge: HOME | End: 2017-11-27
Attending: INTERNAL MEDICINE | Admitting: INTERNAL MEDICINE

## 2017-11-27 DIAGNOSIS — C34.12 MALIGNANT NEOPLASM OF UPPER LOBE, LEFT BRONCHUS OR LUNG (HCC): ICD-10-CM

## 2017-11-27 DIAGNOSIS — C34.12 CANCER OF BRONCHUS OF LEFT UPPER LOBE (HCC): ICD-10-CM

## 2017-11-27 LAB
FUNGUS (MYCOLOGY) CULTURE: NORMAL
FUNGUS STAIN: NORMAL

## 2017-12-06 ENCOUNTER — OFFICE VISIT (OUTPATIENT)
Dept: ENT CLINIC | Age: 47
End: 2017-12-06

## 2017-12-06 VITALS
TEMPERATURE: 98.7 F | BODY MASS INDEX: 29.46 KG/M2 | SYSTOLIC BLOOD PRESSURE: 107 MMHG | WEIGHT: 194.4 LBS | HEART RATE: 78 BPM | HEIGHT: 68 IN | DIASTOLIC BLOOD PRESSURE: 72 MMHG

## 2017-12-06 DIAGNOSIS — C34.90 CARCINOMA OF LUNG, UNSPECIFIED LATERALITY (HCC): Primary | ICD-10-CM

## 2017-12-06 DIAGNOSIS — D37.05 NEOPLASM OF UNCERTAIN BEHAVIOR OF TONSIL: ICD-10-CM

## 2017-12-06 PROCEDURE — G8484 FLU IMMUNIZE NO ADMIN: HCPCS | Performed by: OTOLARYNGOLOGY

## 2017-12-06 PROCEDURE — G8417 CALC BMI ABV UP PARAM F/U: HCPCS | Performed by: OTOLARYNGOLOGY

## 2017-12-06 PROCEDURE — 31575 DIAGNOSTIC LARYNGOSCOPY: CPT | Performed by: OTOLARYNGOLOGY

## 2017-12-06 PROCEDURE — 99243 OFF/OP CNSLTJ NEW/EST LOW 30: CPT | Performed by: OTOLARYNGOLOGY

## 2017-12-06 PROCEDURE — G8427 DOCREV CUR MEDS BY ELIG CLIN: HCPCS | Performed by: OTOLARYNGOLOGY

## 2017-12-06 NOTE — PROGRESS NOTES
CHIEF COMPLAINT: Neoplasm of tonsil    HISTORY OF PRESENT ILLNESS:  40 y.o. female who is recently diagnosed with lung cancer is about to start radiation therapy. Smokes 1 pack per day. Had PET scan which suggested disease in the left tonsil. No throat pain. No dysphagia.     PAST MEDICAL HISTORY:   History   Smoking Status    Current Every Day Smoker    Packs/day: 1.00    Years: 37.00    Types: Cigarettes   Smokeless Tobacco    Never Used     Comment: 10/31/17 0.5ppd                                                    History   Alcohol Use    Yes     Comment: rarely                                                    Current Outpatient Prescriptions:     fluticasone (FLOVENT HFA) 110 MCG/ACT inhaler, Inhale 1 puff into the lungs 2 times daily, Disp: , Rfl:     albuterol sulfate  (90 Base) MCG/ACT inhaler, Inhale 2 puffs into the lungs every 6 hours as needed for Wheezing, Disp: , Rfl:     cephALEXin (KEFLEX) 500 MG capsule, Take 1 capsule by mouth 4 times daily for 10 days, Disp: 40 capsule, Rfl: 0    sulfamethoxazole-trimethoprim (BACTRIM DS) 800-160 MG per tablet, Take 2 tablets by mouth 2 times daily for 10 days, Disp: 40 tablet, Rfl: 0    OXYGEN, Inhale into the lungs 2l as needed, Disp: , Rfl:     tiotropium (SPIRIVA RESPIMAT) 2.5 MCG/ACT AERS inhaler, Inhale 2 puffs into the lungs daily, Disp: 1 Inhaler, Rfl: 1    HYDROcodone-acetaminophen (NORCO) 5-325 MG per tablet, Take 1 tablet by mouth every 6 hours as needed for Pain ., Disp: , Rfl:                                                  Past Medical History:   Diagnosis Date    Anemia     Depression     Emphysema     Emphysema of lung ([facility] [address]     Hyperlipidemia     Hypoglycemia     Ovarian cancer ([facility] [address]     Psychiatric problem     Seizures ([facility] [address]     Uterine cancer ([facility] [address]                                                     Past Surgical History:   Procedure Laterality Date    BACK SURGERY      HYSTERECTOMY      LUNG BIOPSY Left 11/13/2017         REVIEW OF SYSTEMS:  All pertinent positive and negative review of systems included in HPI. Otherwise, all systems are reviewed and negative. PHYSICAL EXAMINATION:   GENERAL: wdwn- no acute distress  COMMUNICATION :  Normal voice  MENTAL STATUS:  Normal  HEAD AND FACE:  Normal  EXTERNAL EARS AND NOSE:  Normal  FACIAL MUSCLES:  Normal  EXTRAOCULAR MUSCLES: Intact  FACE PALPATION:  Negative  OTOSCOPY:  Normal tympanic membranes and middle ear spaces  TUNING FORKS: [place] ++ [place] midline at 512 Hz  INTRANASAL:  Septum midline, turbinates normal, meati clear. LIPS, TEETH, GINGIVA:  Normal mucosa  PHARYNX:  Tonsils are large, but symmetrical.  Don't see any tumor. Both tonsils palpate the same without significant induration. Tongue base is also soft to palpation  NECK:  No masses or adenopathy  SALIVARY GLANDS:  Normal  THYROID:  Normal  As the patient has symptoms suggestive of disease in the larynx or hypopharynx, fiberoptic laryngoscopy is performed. FIBEROPTIC LARYNGOSCOPY:  Nares topically anaesthetized with lidocaine spray. Fiberoptic scope passed per naris into nasopharynx and hypopharyrnx and larynx visualized. Normal tongue base                                                          Normal epiglottis                                                          Normal vocal cords                                                          Normal pyriform sinuses  IMPRESSION: No evidence of carcinoma of the upper aerodigestive tract    PLAN: Patient reassured. FOLLOW-UP: As needed.

## 2017-12-12 LAB
AFB CULTURE (MYCOBACTERIA): NORMAL
AFB SMEAR: NORMAL

## 2017-12-15 ENCOUNTER — TELEPHONE (OUTPATIENT)
Dept: PULMONOLOGY | Age: 47
End: 2017-12-15

## 2017-12-15 NOTE — TELEPHONE ENCOUNTER
Patient cancelled appointment on 12/19/17 with  for bio fua. Reason: has another appointment    Patient did not reschedule appointment. Patient stated that she would call back to reschedule      Last OV   ASSESSMENT:10/31/17  · MICK 2.3x3cm lung nodule: location would be difficult to reach by bronchoscopy  · Mildly hypermetabolic lymph nodes were negative on biopsy  · Tonsil hypermetabolism  · Emphysema  · Tobacco abuse  · Morbid obesity     PLAN:   · I d/w with Dr. Hortensia Hodges  · percutaneous biopsy of the primary mass as the mildly hypermetabolic mediastinal lymph nodes were negative on biopsy was scheduled. · ENT evaluation  · PFT/6MWT  · Start Spiriva respimat .  Stop flovent and continue PRN albuterol  · Tobacco cessation  · F/u for results

## 2018-10-24 ENCOUNTER — HOSPITAL ENCOUNTER (OUTPATIENT)
Dept: CT IMAGING | Age: 48
Discharge: HOME | End: 2018-10-24
Payer: COMMERCIAL

## 2018-10-24 ENCOUNTER — HOSPITAL ENCOUNTER (OUTPATIENT)
Age: 48
Discharge: HOME | End: 2018-10-24
Payer: COMMERCIAL

## 2018-10-24 DIAGNOSIS — R59.0 CERVICAL LYMPHADENOPATHY: ICD-10-CM

## 2018-10-24 DIAGNOSIS — C34.90 NON-SMALL CELL LUNG CANCER, UNSPECIFIED LATERALITY (HCC): ICD-10-CM

## 2018-10-24 PROCEDURE — 74176 CT ABD & PELVIS W/O CONTRAST: CPT

## 2018-10-24 PROCEDURE — 71250 CT THORAX DX C-: CPT

## 2019-03-16 ENCOUNTER — APPOINTMENT (OUTPATIENT)
Dept: GENERAL RADIOLOGY | Age: 49
DRG: 420 | End: 2019-03-16
Payer: COMMERCIAL

## 2019-03-16 ENCOUNTER — HOSPITAL ENCOUNTER (INPATIENT)
Age: 49
LOS: 2 days | Discharge: HOME | DRG: 420 | End: 2019-03-18
Attending: EMERGENCY MEDICINE | Admitting: INTERNAL MEDICINE
Payer: COMMERCIAL

## 2019-03-16 DIAGNOSIS — E11.65 TYPE 2 DIABETES MELLITUS WITH HYPERGLYCEMIA, WITHOUT LONG-TERM CURRENT USE OF INSULIN (HCC): ICD-10-CM

## 2019-03-16 DIAGNOSIS — R07.9 CHEST PAIN, UNSPECIFIED TYPE: Primary | ICD-10-CM

## 2019-03-16 DIAGNOSIS — E13.10 KETOSIS DUE TO DIABETES (HCC): ICD-10-CM

## 2019-03-16 DIAGNOSIS — R06.00 DYSPNEA AND RESPIRATORY ABNORMALITIES: ICD-10-CM

## 2019-03-16 DIAGNOSIS — R73.9 HYPERGLYCEMIA: ICD-10-CM

## 2019-03-16 DIAGNOSIS — R06.89 DYSPNEA AND RESPIRATORY ABNORMALITIES: ICD-10-CM

## 2019-03-16 LAB
A/G RATIO: 1 (ref 1.1–2.2)
ALBUMIN SERPL-MCNC: 4.1 G/DL (ref 3.4–5)
ALP BLD-CCNC: 109 U/L (ref 40–129)
ALT SERPL-CCNC: 16 U/L (ref 10–40)
ANION GAP SERPL CALCULATED.3IONS-SCNC: 19 MMOL/L (ref 3–16)
AST SERPL-CCNC: 17 U/L (ref 15–37)
BASE EXCESS VENOUS: 0 MMOL/L (ref -3–3)
BASOPHILS ABSOLUTE: 0.1 K/UL (ref 0–0.2)
BASOPHILS RELATIVE PERCENT: 0.7 %
BETA-HYDROXYBUTYRATE: 3.2 MMOL/L (ref 0–0.27)
BILIRUB SERPL-MCNC: 0.4 MG/DL (ref 0–1)
BILIRUBIN URINE: NEGATIVE
BLOOD, URINE: ABNORMAL
BUN BLDV-MCNC: 13 MG/DL (ref 7–20)
CALCIUM SERPL-MCNC: 10.1 MG/DL (ref 8.3–10.6)
CARBOXYHEMOGLOBIN: 4.3 % (ref 0–1.5)
CHLORIDE BLD-SCNC: 90 MMOL/L (ref 99–110)
CLARITY: CLEAR
CO2: 23 MMOL/L (ref 21–32)
COLOR: ABNORMAL
CREAT SERPL-MCNC: 0.7 MG/DL (ref 0.6–1.1)
EOSINOPHILS ABSOLUTE: 0.1 K/UL (ref 0–0.6)
EOSINOPHILS RELATIVE PERCENT: 0.7 %
EPITHELIAL CELLS, UA: ABNORMAL /HPF
GFR AFRICAN AMERICAN: >60
GFR NON-AFRICAN AMERICAN: >60
GLOBULIN: 4.2 G/DL
GLUCOSE BLD-MCNC: 308 MG/DL (ref 70–99)
GLUCOSE BLD-MCNC: 318 MG/DL (ref 70–99)
GLUCOSE BLD-MCNC: 523 MG/DL (ref 70–99)
GLUCOSE BLD-MCNC: 603 MG/DL (ref 70–99)
GLUCOSE URINE: >=1000 MG/DL
HCO3 VENOUS: 23.8 MMOL/L (ref 23–29)
HCT VFR BLD CALC: 44.1 % (ref 36–48)
HEMOGLOBIN: 14.6 G/DL (ref 12–16)
KETONES, URINE: 40 MG/DL
LEUKOCYTE ESTERASE, URINE: NEGATIVE
LIPASE: 45 U/L (ref 13–60)
LYMPHOCYTES ABSOLUTE: 1.7 K/UL (ref 1–5.1)
LYMPHOCYTES RELATIVE PERCENT: 17.2 %
MCH RBC QN AUTO: 29.7 PG (ref 26–34)
MCHC RBC AUTO-ENTMCNC: 33 G/DL (ref 31–36)
MCV RBC AUTO: 90 FL (ref 80–100)
METHEMOGLOBIN VENOUS: 0.3 %
MICROSCOPIC EXAMINATION: YES
MONOCYTES ABSOLUTE: 0.5 K/UL (ref 0–1.3)
MONOCYTES RELATIVE PERCENT: 5.2 %
NEUTROPHILS ABSOLUTE: 7.5 K/UL (ref 1.7–7.7)
NEUTROPHILS RELATIVE PERCENT: 76.2 %
NITRITE, URINE: NEGATIVE
O2 CONTENT, VEN: 21 VOL %
O2 SAT, VEN: 98 %
O2 THERAPY: ABNORMAL
PCO2, VEN: 36.2 MMHG (ref 40–50)
PDW BLD-RTO: 13.6 % (ref 12.4–15.4)
PERFORMED ON: ABNORMAL
PH UA: 5.5 (ref 5–8)
PH VENOUS: 7.43 (ref 7.35–7.45)
PLATELET # BLD: 381 K/UL (ref 135–450)
PMV BLD AUTO: 8.6 FL (ref 5–10.5)
PO2, VEN: 119 MMHG (ref 25–40)
POTASSIUM REFLEX MAGNESIUM: 4.7 MMOL/L (ref 3.5–5.1)
PROTEIN UA: NEGATIVE MG/DL
RAPID INFLUENZA  B AGN: NEGATIVE
RAPID INFLUENZA A AGN: NEGATIVE
RBC # BLD: 4.9 M/UL (ref 4–5.2)
RBC UA: ABNORMAL /HPF (ref 0–2)
SODIUM BLD-SCNC: 132 MMOL/L (ref 136–145)
SPECIFIC GRAVITY UA: 1.01 (ref 1–1.03)
TCO2 CALC VENOUS: 25 MMOL/L
TOTAL PROTEIN: 8.3 G/DL (ref 6.4–8.2)
TROPONIN: <0.01 NG/ML
URINE REFLEX TO CULTURE: YES
URINE TYPE: ABNORMAL
UROBILINOGEN, URINE: 0.2 E.U./DL
WBC # BLD: 9.8 K/UL (ref 4–11)
WBC UA: ABNORMAL /HPF (ref 0–5)
YEAST: PRESENT /HPF

## 2019-03-16 PROCEDURE — 99285 EMERGENCY DEPT VISIT HI MDM: CPT

## 2019-03-16 PROCEDURE — 84484 ASSAY OF TROPONIN QUANT: CPT

## 2019-03-16 PROCEDURE — 96375 TX/PRO/DX INJ NEW DRUG ADDON: CPT

## 2019-03-16 PROCEDURE — 82803 BLOOD GASES ANY COMBINATION: CPT

## 2019-03-16 PROCEDURE — 6360000002 HC RX W HCPCS: Performed by: EMERGENCY MEDICINE

## 2019-03-16 PROCEDURE — 83690 ASSAY OF LIPASE: CPT

## 2019-03-16 PROCEDURE — 80053 COMPREHEN METABOLIC PANEL: CPT

## 2019-03-16 PROCEDURE — 2580000003 HC RX 258: Performed by: INTERNAL MEDICINE

## 2019-03-16 PROCEDURE — 6370000000 HC RX 637 (ALT 250 FOR IP): Performed by: INTERNAL MEDICINE

## 2019-03-16 PROCEDURE — 6360000002 HC RX W HCPCS: Performed by: INTERNAL MEDICINE

## 2019-03-16 PROCEDURE — 93005 ELECTROCARDIOGRAM TRACING: CPT | Performed by: EMERGENCY MEDICINE

## 2019-03-16 PROCEDURE — 94664 DEMO&/EVAL PT USE INHALER: CPT

## 2019-03-16 PROCEDURE — 87086 URINE CULTURE/COLONY COUNT: CPT

## 2019-03-16 PROCEDURE — 81001 URINALYSIS AUTO W/SCOPE: CPT

## 2019-03-16 PROCEDURE — 71046 X-RAY EXAM CHEST 2 VIEWS: CPT

## 2019-03-16 PROCEDURE — 94640 AIRWAY INHALATION TREATMENT: CPT

## 2019-03-16 PROCEDURE — 96374 THER/PROPH/DIAG INJ IV PUSH: CPT

## 2019-03-16 PROCEDURE — 6370000000 HC RX 637 (ALT 250 FOR IP): Performed by: EMERGENCY MEDICINE

## 2019-03-16 PROCEDURE — 94761 N-INVAS EAR/PLS OXIMETRY MLT: CPT

## 2019-03-16 PROCEDURE — 96361 HYDRATE IV INFUSION ADD-ON: CPT

## 2019-03-16 PROCEDURE — 87804 INFLUENZA ASSAY W/OPTIC: CPT

## 2019-03-16 PROCEDURE — 82010 KETONE BODYS QUAN: CPT

## 2019-03-16 PROCEDURE — 2580000003 HC RX 258: Performed by: EMERGENCY MEDICINE

## 2019-03-16 PROCEDURE — 85025 COMPLETE CBC W/AUTO DIFF WBC: CPT

## 2019-03-16 PROCEDURE — 2060000000 HC ICU INTERMEDIATE R&B

## 2019-03-16 PROCEDURE — 36415 COLL VENOUS BLD VENIPUNCTURE: CPT

## 2019-03-16 RX ORDER — BENZONATATE 100 MG/1
100 CAPSULE ORAL 2 TIMES DAILY PRN
Status: DISCONTINUED | OUTPATIENT
Start: 2019-03-16 | End: 2019-03-18 | Stop reason: HOSPADM

## 2019-03-16 RX ORDER — FLUTICASONE PROPIONATE 110 UG/1
1 AEROSOL, METERED RESPIRATORY (INHALATION) 2 TIMES DAILY
Status: DISCONTINUED | OUTPATIENT
Start: 2019-03-16 | End: 2019-03-18 | Stop reason: HOSPADM

## 2019-03-16 RX ORDER — INSULIN GLARGINE 100 [IU]/ML
20 INJECTION, SOLUTION SUBCUTANEOUS NIGHTLY
Status: DISCONTINUED | OUTPATIENT
Start: 2019-03-16 | End: 2019-03-18 | Stop reason: HOSPADM

## 2019-03-16 RX ORDER — ONDANSETRON 2 MG/ML
4 INJECTION INTRAMUSCULAR; INTRAVENOUS EVERY 6 HOURS PRN
Status: DISCONTINUED | OUTPATIENT
Start: 2019-03-16 | End: 2019-03-16

## 2019-03-16 RX ORDER — 0.9 % SODIUM CHLORIDE 0.9 %
1000 INTRAVENOUS SOLUTION INTRAVENOUS ONCE
Status: COMPLETED | OUTPATIENT
Start: 2019-03-16 | End: 2019-03-16

## 2019-03-16 RX ORDER — ATORVASTATIN CALCIUM 40 MG/1
40 TABLET, FILM COATED ORAL DAILY
COMMUNITY
End: 2022-10-24

## 2019-03-16 RX ORDER — LEVOTHYROXINE SODIUM 0.03 MG/1
100 TABLET ORAL DAILY
Status: ON HOLD | COMMUNITY
End: 2022-10-25 | Stop reason: HOSPADM

## 2019-03-16 RX ORDER — ASPIRIN 81 MG/1
81 TABLET, CHEWABLE ORAL DAILY
Status: DISCONTINUED | OUTPATIENT
Start: 2019-03-17 | End: 2019-03-18 | Stop reason: HOSPADM

## 2019-03-16 RX ORDER — ALBUTEROL SULFATE 90 UG/1
2 AEROSOL, METERED RESPIRATORY (INHALATION) EVERY 6 HOURS PRN
Status: DISCONTINUED | OUTPATIENT
Start: 2019-03-16 | End: 2019-03-18 | Stop reason: HOSPADM

## 2019-03-16 RX ORDER — DEXTROSE MONOHYDRATE 50 MG/ML
100 INJECTION, SOLUTION INTRAVENOUS PRN
Status: DISCONTINUED | OUTPATIENT
Start: 2019-03-16 | End: 2019-03-17 | Stop reason: SDUPTHER

## 2019-03-16 RX ORDER — HYDROCODONE BITARTRATE AND ACETAMINOPHEN 5; 325 MG/1; MG/1
1 TABLET ORAL EVERY 6 HOURS PRN
Status: DISCONTINUED | OUTPATIENT
Start: 2019-03-16 | End: 2019-03-18 | Stop reason: HOSPADM

## 2019-03-16 RX ORDER — CLOPIDOGREL BISULFATE 75 MG/1
75 TABLET ORAL DAILY
Status: DISCONTINUED | OUTPATIENT
Start: 2019-03-17 | End: 2019-03-18 | Stop reason: HOSPADM

## 2019-03-16 RX ORDER — DEXTROSE MONOHYDRATE 25 G/50ML
12.5 INJECTION, SOLUTION INTRAVENOUS PRN
Status: DISCONTINUED | OUTPATIENT
Start: 2019-03-16 | End: 2019-03-17 | Stop reason: SDUPTHER

## 2019-03-16 RX ORDER — IBUPROFEN 600 MG/1
600 TABLET ORAL EVERY 6 HOURS PRN
Status: DISCONTINUED | OUTPATIENT
Start: 2019-03-16 | End: 2019-03-16

## 2019-03-16 RX ORDER — CLOPIDOGREL BISULFATE 75 MG/1
75 TABLET ORAL DAILY
COMMUNITY
End: 2022-10-24

## 2019-03-16 RX ORDER — ATORVASTATIN CALCIUM 40 MG/1
40 TABLET, FILM COATED ORAL DAILY
Status: DISCONTINUED | OUTPATIENT
Start: 2019-03-17 | End: 2019-03-17 | Stop reason: SDUPTHER

## 2019-03-16 RX ORDER — SODIUM CHLORIDE 0.9 % (FLUSH) 0.9 %
10 SYRINGE (ML) INJECTION PRN
Status: DISCONTINUED | OUTPATIENT
Start: 2019-03-16 | End: 2019-03-18 | Stop reason: HOSPADM

## 2019-03-16 RX ORDER — ASPIRIN 81 MG/1
81 TABLET, CHEWABLE ORAL DAILY
Status: DISCONTINUED | OUTPATIENT
Start: 2019-03-17 | End: 2019-03-17 | Stop reason: SDUPTHER

## 2019-03-16 RX ORDER — ATORVASTATIN CALCIUM 40 MG/1
40 TABLET, FILM COATED ORAL NIGHTLY
Status: DISCONTINUED | OUTPATIENT
Start: 2019-03-16 | End: 2019-03-18 | Stop reason: HOSPADM

## 2019-03-16 RX ORDER — HYDROCHLOROTHIAZIDE 12.5 MG/1
12.5 TABLET ORAL DAILY
COMMUNITY
End: 2022-10-24

## 2019-03-16 RX ORDER — SODIUM CHLORIDE 9 MG/ML
INJECTION, SOLUTION INTRAVENOUS CONTINUOUS
Status: DISCONTINUED | OUTPATIENT
Start: 2019-03-16 | End: 2019-03-18 | Stop reason: HOSPADM

## 2019-03-16 RX ORDER — BENZONATATE 100 MG/1
100 CAPSULE ORAL 2 TIMES DAILY PRN
COMMUNITY
End: 2022-10-24

## 2019-03-16 RX ORDER — LEVOTHYROXINE SODIUM 0.07 MG/1
75 TABLET ORAL DAILY
Status: ON HOLD | COMMUNITY
End: 2019-03-17

## 2019-03-16 RX ORDER — NITROGLYCERIN 0.4 MG/1
0.4 TABLET SUBLINGUAL EVERY 5 MIN PRN
COMMUNITY
End: 2022-10-24

## 2019-03-16 RX ORDER — SODIUM CHLORIDE 0.9 % (FLUSH) 0.9 %
10 SYRINGE (ML) INJECTION EVERY 12 HOURS SCHEDULED
Status: DISCONTINUED | OUTPATIENT
Start: 2019-03-16 | End: 2019-03-18 | Stop reason: HOSPADM

## 2019-03-16 RX ORDER — NICOTINE POLACRILEX 4 MG
15 LOZENGE BUCCAL PRN
Status: DISCONTINUED | OUTPATIENT
Start: 2019-03-16 | End: 2019-03-17 | Stop reason: SDUPTHER

## 2019-03-16 RX ORDER — ONDANSETRON 2 MG/ML
4 INJECTION INTRAMUSCULAR; INTRAVENOUS EVERY 6 HOURS PRN
Status: DISCONTINUED | OUTPATIENT
Start: 2019-03-16 | End: 2019-03-18 | Stop reason: HOSPADM

## 2019-03-16 RX ORDER — HYDROCHLOROTHIAZIDE 25 MG/1
12.5 TABLET ORAL DAILY
Status: DISCONTINUED | OUTPATIENT
Start: 2019-03-17 | End: 2019-03-18 | Stop reason: HOSPADM

## 2019-03-16 RX ORDER — NITROGLYCERIN 0.4 MG/1
0.4 TABLET SUBLINGUAL EVERY 5 MIN PRN
Status: DISCONTINUED | OUTPATIENT
Start: 2019-03-16 | End: 2019-03-18 | Stop reason: HOSPADM

## 2019-03-16 RX ADMIN — INSULIN LISPRO 2 UNITS: 100 INJECTION, SOLUTION INTRAVENOUS; SUBCUTANEOUS at 20:35

## 2019-03-16 RX ADMIN — INSULIN GLARGINE 20 UNITS: 100 INJECTION, SOLUTION SUBCUTANEOUS at 20:35

## 2019-03-16 RX ADMIN — ONDANSETRON 4 MG: 2 INJECTION INTRAMUSCULAR; INTRAVENOUS at 15:32

## 2019-03-16 RX ADMIN — ONDANSETRON 4 MG: 2 INJECTION INTRAMUSCULAR; INTRAVENOUS at 22:46

## 2019-03-16 RX ADMIN — ENOXAPARIN SODIUM 40 MG: 40 INJECTION SUBCUTANEOUS at 20:34

## 2019-03-16 RX ADMIN — SODIUM CHLORIDE 1000 ML: 9 INJECTION, SOLUTION INTRAVENOUS at 16:47

## 2019-03-16 RX ADMIN — SODIUM CHLORIDE 1000 ML: 9 INJECTION, SOLUTION INTRAVENOUS at 15:32

## 2019-03-16 RX ADMIN — INSULIN HUMAN 10 UNITS: 100 INJECTION, SOLUTION PARENTERAL at 16:54

## 2019-03-16 RX ADMIN — Medication 1 PUFF: at 21:26

## 2019-03-16 RX ADMIN — Medication 10 ML: at 20:35

## 2019-03-16 RX ADMIN — SODIUM CHLORIDE: 9 INJECTION, SOLUTION INTRAVENOUS at 20:35

## 2019-03-16 RX ADMIN — HYDROCODONE BITARTRATE AND ACETAMINOPHEN 1 TABLET: 5; 325 TABLET ORAL at 22:46

## 2019-03-16 RX ADMIN — ATORVASTATIN CALCIUM 40 MG: 40 TABLET, FILM COATED ORAL at 20:34

## 2019-03-16 ASSESSMENT — PAIN DESCRIPTION - PROGRESSION: CLINICAL_PROGRESSION: NOT CHANGED

## 2019-03-16 ASSESSMENT — PAIN SCALES - GENERAL
PAINLEVEL_OUTOF10: 8
PAINLEVEL_OUTOF10: 6

## 2019-03-16 ASSESSMENT — PAIN - FUNCTIONAL ASSESSMENT: PAIN_FUNCTIONAL_ASSESSMENT: ACTIVITIES ARE NOT PREVENTED

## 2019-03-16 ASSESSMENT — PAIN DESCRIPTION - ORIENTATION: ORIENTATION: LEFT

## 2019-03-16 ASSESSMENT — PAIN DESCRIPTION - ONSET: ONSET: ON-GOING

## 2019-03-16 ASSESSMENT — PAIN DESCRIPTION - DESCRIPTORS
DESCRIPTORS: ACHING
DESCRIPTORS: HEADACHE

## 2019-03-16 ASSESSMENT — PAIN DESCRIPTION - PAIN TYPE
TYPE: ACUTE PAIN
TYPE: CHRONIC PAIN

## 2019-03-16 ASSESSMENT — PAIN DESCRIPTION - LOCATION
LOCATION: HEAD
LOCATION: SHOULDER

## 2019-03-16 ASSESSMENT — PAIN DESCRIPTION - FREQUENCY: FREQUENCY: CONTINUOUS

## 2019-03-17 LAB
ANION GAP SERPL CALCULATED.3IONS-SCNC: 9 MMOL/L (ref 3–16)
BUN BLDV-MCNC: 12 MG/DL (ref 7–20)
CALCIUM SERPL-MCNC: 8.4 MG/DL (ref 8.3–10.6)
CHLORIDE BLD-SCNC: 99 MMOL/L (ref 99–110)
CHOLESTEROL, TOTAL: 125 MG/DL (ref 0–199)
CO2: 26 MMOL/L (ref 21–32)
CREAT SERPL-MCNC: 0.7 MG/DL (ref 0.6–1.1)
EKG ATRIAL RATE: 75 BPM
EKG ATRIAL RATE: 98 BPM
EKG DIAGNOSIS: NORMAL
EKG DIAGNOSIS: NORMAL
EKG P AXIS: 78 DEGREES
EKG P AXIS: 85 DEGREES
EKG P-R INTERVAL: 144 MS
EKG P-R INTERVAL: 158 MS
EKG Q-T INTERVAL: 398 MS
EKG Q-T INTERVAL: 424 MS
EKG QRS DURATION: 88 MS
EKG QRS DURATION: 96 MS
EKG QTC CALCULATION (BAZETT): 473 MS
EKG QTC CALCULATION (BAZETT): 508 MS
EKG R AXIS: 106 DEGREES
EKG R AXIS: 159 DEGREES
EKG T AXIS: 78 DEGREES
EKG T AXIS: 83 DEGREES
EKG VENTRICULAR RATE: 75 BPM
EKG VENTRICULAR RATE: 98 BPM
GFR AFRICAN AMERICAN: >60
GFR NON-AFRICAN AMERICAN: >60
GLUCOSE BLD-MCNC: 228 MG/DL (ref 70–99)
GLUCOSE BLD-MCNC: 360 MG/DL (ref 70–99)
GLUCOSE BLD-MCNC: 363 MG/DL (ref 70–99)
GLUCOSE BLD-MCNC: 440 MG/DL (ref 70–99)
GLUCOSE BLD-MCNC: 479 MG/DL (ref 70–99)
GLUCOSE BLD-MCNC: 479 MG/DL (ref 70–99)
HCT VFR BLD CALC: 38.4 % (ref 36–48)
HDLC SERPL-MCNC: 30 MG/DL (ref 40–60)
HEMOGLOBIN: 12.9 G/DL (ref 12–16)
LDL CHOLESTEROL CALCULATED: 59 MG/DL
MCH RBC QN AUTO: 29.5 PG (ref 26–34)
MCHC RBC AUTO-ENTMCNC: 33.6 G/DL (ref 31–36)
MCV RBC AUTO: 87.8 FL (ref 80–100)
PDW BLD-RTO: 13.1 % (ref 12.4–15.4)
PERFORMED ON: ABNORMAL
PLATELET # BLD: 346 K/UL (ref 135–450)
PMV BLD AUTO: 8.5 FL (ref 5–10.5)
POTASSIUM REFLEX MAGNESIUM: 4 MMOL/L (ref 3.5–5.1)
RBC # BLD: 4.38 M/UL (ref 4–5.2)
SODIUM BLD-SCNC: 134 MMOL/L (ref 136–145)
TRIGL SERPL-MCNC: 182 MG/DL (ref 0–150)
TROPONIN: <0.01 NG/ML
VLDLC SERPL CALC-MCNC: 36 MG/DL
WBC # BLD: 8.6 K/UL (ref 4–11)

## 2019-03-17 PROCEDURE — 99222 1ST HOSP IP/OBS MODERATE 55: CPT | Performed by: INTERNAL MEDICINE

## 2019-03-17 PROCEDURE — 2060000000 HC ICU INTERMEDIATE R&B

## 2019-03-17 PROCEDURE — 94761 N-INVAS EAR/PLS OXIMETRY MLT: CPT

## 2019-03-17 PROCEDURE — 85027 COMPLETE CBC AUTOMATED: CPT

## 2019-03-17 PROCEDURE — 6370000000 HC RX 637 (ALT 250 FOR IP): Performed by: HOSPITALIST

## 2019-03-17 PROCEDURE — 80061 LIPID PANEL: CPT

## 2019-03-17 PROCEDURE — 80048 BASIC METABOLIC PNL TOTAL CA: CPT

## 2019-03-17 PROCEDURE — 93010 ELECTROCARDIOGRAM REPORT: CPT | Performed by: INTERNAL MEDICINE

## 2019-03-17 PROCEDURE — 6370000000 HC RX 637 (ALT 250 FOR IP): Performed by: INTERNAL MEDICINE

## 2019-03-17 PROCEDURE — 83036 HEMOGLOBIN GLYCOSYLATED A1C: CPT

## 2019-03-17 PROCEDURE — 99232 SBSQ HOSP IP/OBS MODERATE 35: CPT | Performed by: INTERNAL MEDICINE

## 2019-03-17 PROCEDURE — 2580000003 HC RX 258: Performed by: INTERNAL MEDICINE

## 2019-03-17 PROCEDURE — 94150 VITAL CAPACITY TEST: CPT

## 2019-03-17 PROCEDURE — 6360000002 HC RX W HCPCS: Performed by: INTERNAL MEDICINE

## 2019-03-17 PROCEDURE — 36415 COLL VENOUS BLD VENIPUNCTURE: CPT

## 2019-03-17 PROCEDURE — 94640 AIRWAY INHALATION TREATMENT: CPT

## 2019-03-17 PROCEDURE — 84484 ASSAY OF TROPONIN QUANT: CPT

## 2019-03-17 PROCEDURE — 93005 ELECTROCARDIOGRAM TRACING: CPT | Performed by: INTERNAL MEDICINE

## 2019-03-17 RX ORDER — LANSOPRAZOLE 15 MG/1
15 CAPSULE, DELAYED RELEASE ORAL DAILY
COMMUNITY
End: 2022-10-24

## 2019-03-17 RX ORDER — NICOTINE POLACRILEX 4 MG
15 LOZENGE BUCCAL PRN
Status: DISCONTINUED | OUTPATIENT
Start: 2019-03-17 | End: 2019-03-18 | Stop reason: HOSPADM

## 2019-03-17 RX ORDER — DEXTROSE MONOHYDRATE 50 MG/ML
100 INJECTION, SOLUTION INTRAVENOUS PRN
Status: DISCONTINUED | OUTPATIENT
Start: 2019-03-17 | End: 2019-03-18 | Stop reason: HOSPADM

## 2019-03-17 RX ORDER — HYDROXYZINE PAMOATE 25 MG/1
25 CAPSULE ORAL 4 TIMES DAILY PRN
COMMUNITY
End: 2022-10-24

## 2019-03-17 RX ORDER — DEXTROSE MONOHYDRATE 25 G/50ML
12.5 INJECTION, SOLUTION INTRAVENOUS PRN
Status: DISCONTINUED | OUTPATIENT
Start: 2019-03-17 | End: 2019-03-18 | Stop reason: HOSPADM

## 2019-03-17 RX ADMIN — ASPIRIN 81 MG 81 MG: 81 TABLET ORAL at 08:31

## 2019-03-17 RX ADMIN — SODIUM CHLORIDE: 9 INJECTION, SOLUTION INTRAVENOUS at 21:02

## 2019-03-17 RX ADMIN — SODIUM CHLORIDE: 9 INJECTION, SOLUTION INTRAVENOUS at 12:13

## 2019-03-17 RX ADMIN — INSULIN LISPRO 10 UNITS: 100 INJECTION, SOLUTION INTRAVENOUS; SUBCUTANEOUS at 02:32

## 2019-03-17 RX ADMIN — INSULIN LISPRO 10 UNITS: 100 INJECTION, SOLUTION INTRAVENOUS; SUBCUTANEOUS at 08:31

## 2019-03-17 RX ADMIN — CLOPIDOGREL BISULFATE 75 MG: 75 TABLET ORAL at 08:31

## 2019-03-17 RX ADMIN — INSULIN LISPRO 10 UNITS: 100 INJECTION, SOLUTION INTRAVENOUS; SUBCUTANEOUS at 11:16

## 2019-03-17 RX ADMIN — HYDROCODONE BITARTRATE AND ACETAMINOPHEN 1 TABLET: 5; 325 TABLET ORAL at 18:05

## 2019-03-17 RX ADMIN — Medication 1 PUFF: at 07:59

## 2019-03-17 RX ADMIN — INSULIN LISPRO 5 UNITS: 100 INJECTION, SOLUTION INTRAVENOUS; SUBCUTANEOUS at 21:02

## 2019-03-17 RX ADMIN — INSULIN LISPRO 10 UNITS: 100 INJECTION, SOLUTION INTRAVENOUS; SUBCUTANEOUS at 16:58

## 2019-03-17 RX ADMIN — INSULIN LISPRO 4 UNITS: 100 INJECTION, SOLUTION INTRAVENOUS; SUBCUTANEOUS at 16:57

## 2019-03-17 RX ADMIN — INSULIN GLARGINE 20 UNITS: 100 INJECTION, SOLUTION SUBCUTANEOUS at 21:02

## 2019-03-17 RX ADMIN — HYDROCODONE BITARTRATE AND ACETAMINOPHEN 1 TABLET: 5; 325 TABLET ORAL at 04:37

## 2019-03-17 RX ADMIN — INSULIN LISPRO 10 UNITS: 100 INJECTION, SOLUTION INTRAVENOUS; SUBCUTANEOUS at 08:36

## 2019-03-17 RX ADMIN — HYDROCODONE BITARTRATE AND ACETAMINOPHEN 1 TABLET: 5; 325 TABLET ORAL at 10:25

## 2019-03-17 RX ADMIN — HYDROCHLOROTHIAZIDE 12.5 MG: 25 TABLET ORAL at 08:31

## 2019-03-17 RX ADMIN — LEVOTHYROXINE SODIUM 75 MCG: 25 TABLET ORAL at 05:11

## 2019-03-17 RX ADMIN — INSULIN LISPRO 10 UNITS: 100 INJECTION, SOLUTION INTRAVENOUS; SUBCUTANEOUS at 11:13

## 2019-03-17 RX ADMIN — ATORVASTATIN CALCIUM 40 MG: 40 TABLET, FILM COATED ORAL at 21:03

## 2019-03-17 RX ADMIN — ENOXAPARIN SODIUM 40 MG: 40 INJECTION SUBCUTANEOUS at 21:03

## 2019-03-17 RX ADMIN — Medication 1 PUFF: at 20:10

## 2019-03-17 ASSESSMENT — PAIN SCALES - GENERAL
PAINLEVEL_OUTOF10: 7
PAINLEVEL_OUTOF10: 8
PAINLEVEL_OUTOF10: 8

## 2019-03-17 ASSESSMENT — PAIN DESCRIPTION - ONSET: ONSET: ON-GOING

## 2019-03-17 ASSESSMENT — PAIN - FUNCTIONAL ASSESSMENT: PAIN_FUNCTIONAL_ASSESSMENT: ACTIVITIES ARE NOT PREVENTED

## 2019-03-17 ASSESSMENT — PAIN DESCRIPTION - PROGRESSION: CLINICAL_PROGRESSION: NOT CHANGED

## 2019-03-17 ASSESSMENT — PAIN DESCRIPTION - DESCRIPTORS: DESCRIPTORS: CONSTANT

## 2019-03-17 ASSESSMENT — PAIN DESCRIPTION - LOCATION: LOCATION: GENERALIZED

## 2019-03-17 ASSESSMENT — PAIN DESCRIPTION - FREQUENCY: FREQUENCY: CONTINUOUS

## 2019-03-17 ASSESSMENT — PAIN DESCRIPTION - PAIN TYPE: TYPE: ACUTE PAIN

## 2019-03-18 VITALS
RESPIRATION RATE: 18 BRPM | BODY MASS INDEX: 31.29 KG/M2 | SYSTOLIC BLOOD PRESSURE: 101 MMHG | DIASTOLIC BLOOD PRESSURE: 75 MMHG | HEIGHT: 66 IN | OXYGEN SATURATION: 93 % | TEMPERATURE: 97.2 F | HEART RATE: 76 BPM | WEIGHT: 194.7 LBS

## 2019-03-18 LAB
ESTIMATED AVERAGE GLUCOSE: 380.9 MG/DL
GLUCOSE BLD-MCNC: 289 MG/DL (ref 70–99)
GLUCOSE BLD-MCNC: 323 MG/DL (ref 70–99)
GLUCOSE BLD-MCNC: 409 MG/DL (ref 70–99)
HBA1C MFR BLD: 14.9 %
ORGANISM: ABNORMAL
PERFORMED ON: ABNORMAL
URINE CULTURE, ROUTINE: ABNORMAL
URINE CULTURE, ROUTINE: ABNORMAL

## 2019-03-18 PROCEDURE — 99239 HOSP IP/OBS DSCHRG MGMT >30: CPT | Performed by: INTERNAL MEDICINE

## 2019-03-18 PROCEDURE — 6370000000 HC RX 637 (ALT 250 FOR IP): Performed by: INTERNAL MEDICINE

## 2019-03-18 PROCEDURE — 6370000000 HC RX 637 (ALT 250 FOR IP): Performed by: HOSPITALIST

## 2019-03-18 PROCEDURE — 99232 SBSQ HOSP IP/OBS MODERATE 35: CPT | Performed by: INTERNAL MEDICINE

## 2019-03-18 PROCEDURE — 2580000003 HC RX 258: Performed by: INTERNAL MEDICINE

## 2019-03-18 PROCEDURE — 94761 N-INVAS EAR/PLS OXIMETRY MLT: CPT

## 2019-03-18 PROCEDURE — 94640 AIRWAY INHALATION TREATMENT: CPT

## 2019-03-18 RX ORDER — DIPHENHYDRAMINE HCL 25 MG
25 TABLET ORAL EVERY 6 HOURS PRN
Status: DISCONTINUED | OUTPATIENT
Start: 2019-03-18 | End: 2019-03-18 | Stop reason: HOSPADM

## 2019-03-18 RX ORDER — LANCETS
EACH MISCELLANEOUS
Qty: 200 EACH | Refills: 2 | Status: SHIPPED | OUTPATIENT
Start: 2019-03-18

## 2019-03-18 RX ORDER — BLOOD-GLUCOSE METER
EACH MISCELLANEOUS
Qty: 1 KIT | Refills: 0 | Status: SHIPPED | OUTPATIENT
Start: 2019-03-18

## 2019-03-18 RX ORDER — BLOOD GLUCOSE CONTROL HIGH,LOW
EACH MISCELLANEOUS
Qty: 1 EACH | Refills: 0 | Status: SHIPPED | OUTPATIENT
Start: 2019-03-18

## 2019-03-18 RX ADMIN — INSULIN LISPRO 10 UNITS: 100 INJECTION, SOLUTION INTRAVENOUS; SUBCUTANEOUS at 08:25

## 2019-03-18 RX ADMIN — CLOPIDOGREL BISULFATE 75 MG: 75 TABLET ORAL at 08:24

## 2019-03-18 RX ADMIN — HYDROCHLOROTHIAZIDE 12.5 MG: 25 TABLET ORAL at 08:24

## 2019-03-18 RX ADMIN — LEVOTHYROXINE SODIUM 75 MCG: 25 TABLET ORAL at 08:24

## 2019-03-18 RX ADMIN — SODIUM CHLORIDE: 9 INJECTION, SOLUTION INTRAVENOUS at 05:21

## 2019-03-18 RX ADMIN — Medication 1 PUFF: at 07:30

## 2019-03-18 RX ADMIN — HYDROCODONE BITARTRATE AND ACETAMINOPHEN 1 TABLET: 5; 325 TABLET ORAL at 05:30

## 2019-03-18 RX ADMIN — INSULIN LISPRO 6 UNITS: 100 INJECTION, SOLUTION INTRAVENOUS; SUBCUTANEOUS at 08:26

## 2019-03-18 RX ADMIN — ASPIRIN 81 MG 81 MG: 81 TABLET ORAL at 08:24

## 2019-03-18 RX ADMIN — DIPHENHYDRAMINE HCL 25 MG: 25 TABLET ORAL at 01:44

## 2019-03-18 ASSESSMENT — PAIN SCALES - GENERAL
PAINLEVEL_OUTOF10: 5
PAINLEVEL_OUTOF10: 7

## 2019-05-02 ENCOUNTER — HOSPITAL ENCOUNTER (OUTPATIENT)
Dept: CT IMAGING | Age: 49
Discharge: HOME | End: 2019-05-02
Payer: COMMERCIAL

## 2019-05-02 DIAGNOSIS — R59.0 CERVICAL LYMPHADENOPATHY: ICD-10-CM

## 2019-05-02 DIAGNOSIS — C34.12 PRIMARY MALIGNANT NEOPLASM OF BRONCHUS OF LEFT UPPER LOBE (HCC): ICD-10-CM

## 2019-05-02 PROCEDURE — 74176 CT ABD & PELVIS W/O CONTRAST: CPT

## 2019-05-02 PROCEDURE — 71250 CT THORAX DX C-: CPT

## 2019-05-08 ENCOUNTER — TELEPHONE (OUTPATIENT)
Dept: SURGERY | Age: 49
End: 2019-05-08

## 2019-05-08 NOTE — TELEPHONE ENCOUNTER
Advised patient her port will be placed by Dr. Sofie Pinto on Tuesday, May 21, 2019 at [address] [facility] at 8:15 a.m., surgery at 10:15 a.m.  NPO after midnight. She will get Cardiac Clearance from Dr. Jacqui Khanna and check to see when she should stop Plavix and Aspirin.

## 2019-05-17 ENCOUNTER — TELEPHONE (OUTPATIENT)
Dept: SURGERY | Age: 49
End: 2019-05-17

## 2019-05-17 NOTE — TELEPHONE ENCOUNTER
Cancelled port placement scheduled on 5/21/19. Per Cardiologist, she is unable to have surgery until July.

## 2019-05-24 ENCOUNTER — APPOINTMENT (OUTPATIENT)
Dept: GENERAL RADIOLOGY | Age: 49
End: 2019-05-24
Payer: COMMERCIAL

## 2019-05-24 ENCOUNTER — HOSPITAL ENCOUNTER (OUTPATIENT)
Age: 49
Setting detail: OBSERVATION
Discharge: HOME | End: 2019-05-25
Attending: EMERGENCY MEDICINE | Admitting: EMERGENCY MEDICINE
Payer: COMMERCIAL

## 2019-05-24 ENCOUNTER — APPOINTMENT (OUTPATIENT)
Dept: NUCLEAR MEDICINE | Age: 49
End: 2019-05-24
Payer: COMMERCIAL

## 2019-05-24 DIAGNOSIS — R06.89 DYSPNEA AND RESPIRATORY ABNORMALITIES: ICD-10-CM

## 2019-05-24 DIAGNOSIS — R06.00 DYSPNEA AND RESPIRATORY ABNORMALITIES: ICD-10-CM

## 2019-05-24 DIAGNOSIS — I26.99 OTHER ACUTE PULMONARY EMBOLISM WITHOUT ACUTE COR PULMONALE (HCC): ICD-10-CM

## 2019-05-24 DIAGNOSIS — T45.1X5A ADVERSE EFFECT OF CHEMOTHERAPY, INITIAL ENCOUNTER: ICD-10-CM

## 2019-05-24 DIAGNOSIS — I20.0 UNSTABLE ANGINA (HCC): Primary | ICD-10-CM

## 2019-05-24 DIAGNOSIS — Z85.118 H/O: LUNG CANCER: ICD-10-CM

## 2019-05-24 PROBLEM — C80.1 SMALL CELL CARCINOMA (HCC): Status: ACTIVE | Noted: 2019-05-24

## 2019-05-24 LAB
A/G RATIO: 1.1 (ref 1.1–2.2)
ALBUMIN SERPL-MCNC: 3.8 G/DL (ref 3.4–5)
ALP BLD-CCNC: 78 U/L (ref 40–129)
ALT SERPL-CCNC: 9 U/L (ref 10–40)
ANION GAP SERPL CALCULATED.3IONS-SCNC: 11 MMOL/L (ref 3–16)
AST SERPL-CCNC: 11 U/L (ref 15–37)
BASOPHILS ABSOLUTE: 0.1 K/UL (ref 0–0.2)
BASOPHILS RELATIVE PERCENT: 0.8 %
BILIRUB SERPL-MCNC: 0.3 MG/DL (ref 0–1)
BUN BLDV-MCNC: 11 MG/DL (ref 7–20)
CALCIUM SERPL-MCNC: 9.3 MG/DL (ref 8.3–10.6)
CHLORIDE BLD-SCNC: 106 MMOL/L (ref 99–110)
CO2: 25 MMOL/L (ref 21–32)
CREAT SERPL-MCNC: <0.5 MG/DL (ref 0.6–1.1)
EKG ATRIAL RATE: 92 BPM
EKG DIAGNOSIS: NORMAL
EKG P AXIS: 76 DEGREES
EKG P-R INTERVAL: 156 MS
EKG Q-T INTERVAL: 418 MS
EKG QRS DURATION: 82 MS
EKG QTC CALCULATION (BAZETT): 516 MS
EKG R AXIS: 94 DEGREES
EKG T AXIS: 78 DEGREES
EKG VENTRICULAR RATE: 92 BPM
EOSINOPHILS ABSOLUTE: 0.2 K/UL (ref 0–0.6)
EOSINOPHILS RELATIVE PERCENT: 2.5 %
GFR AFRICAN AMERICAN: >60
GFR NON-AFRICAN AMERICAN: >60
GLOBULIN: 3.6 G/DL
GLUCOSE BLD-MCNC: 177 MG/DL (ref 70–99)
HCT VFR BLD CALC: 38.8 % (ref 36–48)
HEMOGLOBIN: 12.8 G/DL (ref 12–16)
INR BLD: 1.11 (ref 0.86–1.14)
LYMPHOCYTES ABSOLUTE: 1.4 K/UL (ref 1–5.1)
LYMPHOCYTES RELATIVE PERCENT: 21.5 %
MCH RBC QN AUTO: 28.1 PG (ref 26–34)
MCHC RBC AUTO-ENTMCNC: 33.1 G/DL (ref 31–36)
MCV RBC AUTO: 85 FL (ref 80–100)
MONOCYTES ABSOLUTE: 0.6 K/UL (ref 0–1.3)
MONOCYTES RELATIVE PERCENT: 8.8 %
NEUTROPHILS ABSOLUTE: 4.3 K/UL (ref 1.7–7.7)
NEUTROPHILS RELATIVE PERCENT: 66.4 %
PDW BLD-RTO: 15.1 % (ref 12.4–15.4)
PLATELET # BLD: 305 K/UL (ref 135–450)
PMV BLD AUTO: 7.5 FL (ref 5–10.5)
POTASSIUM SERPL-SCNC: 3.9 MMOL/L (ref 3.5–5.1)
PROTHROMBIN TIME: 12.7 SEC (ref 9.8–13)
RBC # BLD: 4.57 M/UL (ref 4–5.2)
SODIUM BLD-SCNC: 142 MMOL/L (ref 136–145)
TOTAL PROTEIN: 7.4 G/DL (ref 6.4–8.2)
TROPONIN: <0.01 NG/ML
WBC # BLD: 6.5 K/UL (ref 4–11)

## 2019-05-24 PROCEDURE — 78582 LUNG VENTILAT&PERFUS IMAGING: CPT

## 2019-05-24 PROCEDURE — G0378 HOSPITAL OBSERVATION PER HR: HCPCS

## 2019-05-24 PROCEDURE — 85025 COMPLETE CBC W/AUTO DIFF WBC: CPT

## 2019-05-24 PROCEDURE — A9540 TC99M MAA: HCPCS | Performed by: EMERGENCY MEDICINE

## 2019-05-24 PROCEDURE — 36415 COLL VENOUS BLD VENIPUNCTURE: CPT

## 2019-05-24 PROCEDURE — 71046 X-RAY EXAM CHEST 2 VIEWS: CPT

## 2019-05-24 PROCEDURE — 99285 EMERGENCY DEPT VISIT HI MDM: CPT

## 2019-05-24 PROCEDURE — 93010 ELECTROCARDIOGRAM REPORT: CPT | Performed by: INTERNAL MEDICINE

## 2019-05-24 PROCEDURE — 85610 PROTHROMBIN TIME: CPT

## 2019-05-24 PROCEDURE — 84484 ASSAY OF TROPONIN QUANT: CPT

## 2019-05-24 PROCEDURE — A9558 XE133 XENON 10MCI: HCPCS | Performed by: EMERGENCY MEDICINE

## 2019-05-24 PROCEDURE — 96372 THER/PROPH/DIAG INJ SC/IM: CPT

## 2019-05-24 PROCEDURE — 93005 ELECTROCARDIOGRAM TRACING: CPT | Performed by: EMERGENCY MEDICINE

## 2019-05-24 PROCEDURE — 6360000002 HC RX W HCPCS: Performed by: EMERGENCY MEDICINE

## 2019-05-24 PROCEDURE — 80053 COMPREHEN METABOLIC PANEL: CPT

## 2019-05-24 PROCEDURE — 3430000000 HC RX DIAGNOSTIC RADIOPHARMACEUTICAL: Performed by: EMERGENCY MEDICINE

## 2019-05-24 PROCEDURE — 6370000000 HC RX 637 (ALT 250 FOR IP): Performed by: EMERGENCY MEDICINE

## 2019-05-24 RX ORDER — METOPROLOL SUCCINATE 25 MG/1
25 TABLET, EXTENDED RELEASE ORAL DAILY
COMMUNITY
End: 2022-10-24

## 2019-05-24 RX ORDER — OXYCODONE HCL 10 MG/1
10 TABLET, FILM COATED, EXTENDED RELEASE ORAL ONCE
Status: COMPLETED | OUTPATIENT
Start: 2019-05-24 | End: 2019-05-24

## 2019-05-24 RX ORDER — XENON XE-133 10 MCI/1
15.1 GAS RESPIRATORY (INHALATION)
Status: COMPLETED | OUTPATIENT
Start: 2019-05-24 | End: 2019-05-24

## 2019-05-24 RX ORDER — MORPHINE SULFATE 4 MG/ML
4 INJECTION, SOLUTION INTRAMUSCULAR; INTRAVENOUS EVERY 30 MIN PRN
Status: DISCONTINUED | OUTPATIENT
Start: 2019-05-24 | End: 2019-05-24

## 2019-05-24 RX ORDER — PROMETHAZINE HYDROCHLORIDE 25 MG/ML
25 INJECTION, SOLUTION INTRAMUSCULAR; INTRAVENOUS ONCE
Status: COMPLETED | OUTPATIENT
Start: 2019-05-24 | End: 2019-05-24

## 2019-05-24 RX ADMIN — Medication 4.8 MILLICURIE: at 20:59

## 2019-05-24 RX ADMIN — OXYCODONE HYDROCHLORIDE 10 MG: 10 TABLET, FILM COATED, EXTENDED RELEASE ORAL at 21:56

## 2019-05-24 RX ADMIN — NITROGLYCERIN 0.5 INCH: 20 OINTMENT TOPICAL at 21:18

## 2019-05-24 RX ADMIN — PROMETHAZINE HYDROCHLORIDE 25 MG: 25 INJECTION INTRAMUSCULAR; INTRAVENOUS at 19:24

## 2019-05-24 RX ADMIN — XENON XE-133 15.1 MILLICURIE: 10 GAS RESPIRATORY (INHALATION) at 20:58

## 2019-05-24 ASSESSMENT — PAIN SCALES - GENERAL
PAINLEVEL_OUTOF10: 7
PAINLEVEL_OUTOF10: 0
PAINLEVEL_OUTOF10: 0

## 2019-05-24 ASSESSMENT — ENCOUNTER SYMPTOMS
SORE THROAT: 0
PHOTOPHOBIA: 0
STRIDOR: 0
EYE DISCHARGE: 0
EYE PAIN: 0
ABDOMINAL PAIN: 0
VOMITING: 0
BACK PAIN: 0
COUGH: 0
NAUSEA: 0
ABDOMINAL DISTENTION: 0
SINUS PRESSURE: 0
RECTAL PAIN: 0
COLOR CHANGE: 0
VOICE CHANGE: 0
BLOOD IN STOOL: 0
DIARRHEA: 0
CHEST TIGHTNESS: 0
CONSTIPATION: 0
SHORTNESS OF BREATH: 1
WHEEZING: 0
RHINORRHEA: 0
EYE REDNESS: 0
TROUBLE SWALLOWING: 0
APNEA: 0

## 2019-05-24 ASSESSMENT — PAIN DESCRIPTION - LOCATION: LOCATION: CHEST

## 2019-05-24 ASSESSMENT — PAIN DESCRIPTION - PAIN TYPE: TYPE: ACUTE PAIN

## 2019-05-24 ASSESSMENT — PAIN DESCRIPTION - DESCRIPTORS: DESCRIPTORS: PRESSURE

## 2019-05-24 NOTE — ED NOTES
Bed: 04  Expected date:   Expected time:   Means of arrival:   Comments:  SHAHRIAR Lennon, RN  05/24/19 [id number]

## 2019-05-24 NOTE — ED PROVIDER NOTES
Rehan Gan os a 53year old female with a history of small cell carcinoma of the lung who just started getting a new chemotherapeutic medication, Gemcitaibine IV today for the first time. About an hour after finishing her infusion she developed sudden onset of chest pain \"like a 500 pound man sitting on my chest\" radiating to her throat, diaphoresis, and SOB. She was given aspirin and one NTG SL en route, . Her BP dropped but it did improve her chest pain. She has a history of CAD, and recently had two stents placed. Dr. Mayra Bruno is her cardiologist.       /61   Pulse 90   Temp 98.2 °F (36.8 °C) (Oral)   Resp 18   Ht 5' 7\" (1.702 m)   Wt 182 lb (82.6 kg)   SpO2 92%   BMI 28.51 kg/m²     I have reviewed the following from the nursing documentation:      Prior to Admission medications    Medication Sig Start Date End Date Taking? Authorizing Provider   OXYCODONE HCL, ABUSE DETER, PO Take 10 mg by mouth   Yes Historical Provider, MD   metoprolol succinate (TOPROL XL) 25 MG extended release tablet Take 25 mg by mouth daily   Yes Historical Provider, MD   hydrOXYzine (VISTARIL) 25 MG capsule Take 25 mg by mouth 4 times daily as needed for Itching   Yes Historical Provider, MD   aspirin 81 MG tablet Take 81 mg by mouth daily   Yes Historical Provider, MD   clopidogrel (PLAVIX) 75 MG tablet Take 75 mg by mouth daily   Yes Historical Provider, MD   nitroGLYCERIN (NITROSTAT) 0.4 MG SL tablet Place 0.4 mg under the tongue every 5 minutes as needed for Chest pain (Daily as needed) up to max of 3 total doses. If no relief after 1 dose, call 911.    Yes Historical Provider, MD   atorvastatin (LIPITOR) 40 MG tablet Take 40 mg by mouth daily   Yes Historical Provider, MD   hydrochlorothiazide (HYDRODIURIL) 12.5 MG tablet Take 12.5 mg by mouth daily   Yes Historical Provider, MD   levothyroxine (SYNTHROID) 25 MCG tablet Take 100 mcg by mouth Daily    Yes Historical Provider, MD   benzonatate (TESSALON) 100 MG capsule Take 100 mg by mouth 2 times daily as needed for Cough   Yes Historical Provider, MD   ibuprofen (IBU) 600 MG tablet Take 1 tablet by mouth every 6 hours as needed for Pain 6/18/18  Yes Alvino Herrera PA-C   albuterol sulfate  (90 Base) MCG/ACT inhaler Inhale 2 puffs into the lungs every 6 hours as needed for Wheezing   Yes Historical Provider, MD   insulin glargine (LANTUS SOLOSTAR) 100 UNIT/ML injection pen Inject 35 Units into the skin nightly 3/18/19   Xavier Villanueva MD   insulin lispro (HUMALOG KWIKPEN) 100 UNIT/ML pen Inject 25 Units into the skin 3 times daily (before meals) 3/18/19   Xavier Villanueva MD   Insulin Pen Needle (B-D ULTRAFINE III SHORT PEN) 31G X 8 MM MISC 1 each by Does not apply route daily 3/18/19   Xavier Villanueva MD   blood glucose test strips (ACCU-CHEK KRISTINA PLUS) strip Check qid 3/18/19   Xavier Villanueva MD   Blood Glucose Monitoring Suppl (ACCU-CHEK KRISTINA PLUS) w/Device KIT Check qid 3/18/19   Xavier Villanueva MD   Blood Glucose Calibration (ACCU-CHEK KRISTINA) SOLN Check when you use a new box of strips 3/18/19   Xavier Villanueva MD   ACCU-CHEK MULTICLIX LANCETS MISC Check qid 3/18/19   Xavier Villanueva MD   lansoprazole (PREVACID) 15 MG delayed release capsule Take 15 mg by mouth daily    Historical Provider, MD   fluticasone (FLOVENT HFA) 110 MCG/ACT inhaler Inhale 1 puff into the lungs 2 times daily    Historical Provider, MD   tiotropium (SPIRIVA RESPIMAT) 2.5 MCG/ACT AERS inhaler Inhale 2 puffs into the lungs daily 10/31/17 11/30/17  Alexx Ballesteros MD       Allergies as of 05/24/2019 - Review Complete 05/24/2019   Allergen Reaction Noted    Amoxicillin Anaphylaxis 05/12/2011    Pcn [penicillins] Other (See Comments) 05/12/2011    Iv dye [iodides]  08/22/2017    Percocet [oxycodone-acetaminophen]  10/22/2012    Shellfish-derived products  10/31/2017    Tramadol  10/01/2013    Tylenol with codeine [acetaminophen-codeine]  07/28/2011       Past Medical History:   Diagnosis Date    Anemia     Cancer ([facility] 2016    non small cell lung cancer    Depression     Diabetes mellitus ([facility]     Emphysema     Emphysema of lung ([facility]     Hyperlipidemia     Hypoglycemia     Psychiatric problem     Seizures ([facility]     Type 2 diabetes mellitus with hyperglycemia, without long-term current use of insulin ([facility] 3/16/2019        Surgical History:   Past Surgical History:   Procedure Laterality Date    BACK SURGERY      HYSTERECTOMY      LUNG BIOPSY Left 11/13/2017        Family History:    Family History   Problem Relation Age of Onset    Stroke Mother     Migraines Mother     Stroke Father     High Cholesterol Father     Depression Sister     Migraines Sister        Social History     Socioeconomic History    Marital status:      Spouse name: Not on file    Number of children: Not on file    Years of education: Not on file    Highest education level: Not on file   Occupational History    Not on file   Social Needs    Financial resource strain: Not on file    Food insecurity:     Worry: Not on file     Inability: Not on file    Transportation needs:     Medical: Not on file     Non-medical: Not on file   Tobacco Use    Smoking status: Current Every Day Smoker     Packs/day: 0.20     Years: 37.00     Pack years: 7.40     Types: Cigarettes    Smokeless tobacco: Never Used    Tobacco comment: 10/31/17 0.5ppd   Substance and Sexual Activity    Alcohol use: Yes     Comment: rarely    Drug use: No    Sexual activity: Not on file   Lifestyle    Physical activity:     Days per week: Not on file     Minutes per session: Not on file    Stress: Not on file   Relationships    Social connections:     Talks on phone: Not on file     Gets together: Not on file     Attends Hindu service: Not on file     Active member of club or organization: Not on file     Attends meetings of clubs or organizations: Not on file     Relationship status: Not on file    Intimate partner violence:     Fear of current or ex partner: Not on file     Emotionally abused: Not on file     Physically abused: Not on file     Forced sexual activity: Not on file   Other Topics Concern    Not on file   Social History Narrative    Not on file         Review of Systems   Constitutional: Positive for diaphoresis. Negative for activity change, appetite change, chills, fatigue, fever and unexpected weight change. HENT: Negative for congestion, ear pain, mouth sores, rhinorrhea, sinus pressure, sore throat, tinnitus, trouble swallowing and voice change. Eyes: Negative for photophobia, pain, discharge, redness and visual disturbance. Respiratory: Positive for shortness of breath. Negative for apnea, cough, chest tightness, wheezing and stridor. Cardiovascular: Positive for chest pain. Negative for palpitations and leg swelling. Gastrointestinal: Negative for abdominal distention, abdominal pain, blood in stool, constipation, diarrhea, nausea, rectal pain and vomiting. Genitourinary: Negative for difficulty urinating, dyspareunia, dysuria, flank pain, frequency, genital sores, menstrual problem, pelvic pain, urgency, vaginal bleeding, vaginal discharge and vaginal pain. Musculoskeletal: Negative for arthralgias, back pain, joint swelling, neck pain and neck stiffness. Skin: Negative for color change and rash. Neurological: Negative for dizziness, tremors, seizures, syncope, facial asymmetry, speech difficulty, weakness, light-headedness, numbness and headaches. Hematological: Negative for adenopathy. Does not bruise/bleed easily. Psychiatric/Behavioral: Negative for agitation, confusion, dysphoric mood, hallucinations, self-injury, sleep disturbance and suicidal ideas. All other systems reviewed and are negative. Physical Exam   Constitutional: She is oriented to person, place, and time.  She appears well-developed and well-nourished. No distress. HENT:   Head: Normocephalic and atraumatic. Left Ear: External ear normal.   Mouth/Throat: Oropharynx is clear and moist. No oropharyngeal exudate. Eyes: Pupils are equal, round, and reactive to light. Conjunctivae and EOM are normal. Right eye exhibits no discharge. Left eye exhibits no discharge. Neck: Normal range of motion. No JVD present. No tracheal deviation present. Cardiovascular: Normal rate, regular rhythm, normal heart sounds and intact distal pulses. Exam reveals no gallop and no friction rub. No murmur heard. Pulmonary/Chest: Effort normal and breath sounds normal. No stridor. No respiratory distress. She has no wheezes. She has no rales. She exhibits no tenderness. Abdominal: Soft. Bowel sounds are normal. She exhibits no distension and no mass. There is no tenderness. There is no rebound and no guarding. Musculoskeletal: Normal range of motion. She exhibits no edema or tenderness. Lymphadenopathy:     She has no cervical adenopathy. Neurological: She is alert and oriented to person, place, and time. She displays normal reflexes. No cranial nerve deficit. She exhibits normal muscle tone. Coordination normal.   Skin: No rash noted. Psychiatric: She has a normal mood and affect.  Her behavior is normal. Judgment and thought content normal.       Procedures    MDM  Results for orders placed or performed during the hospital encounter of 05/24/19   CBC Auto Differential   Result Value Ref Range    WBC 6.5 4.0 - 11.0 K/uL    RBC 4.57 4.00 - 5.20 M/uL    Hemoglobin 12.8 12.0 - 16.0 g/dL    Hematocrit 38.8 36.0 - 48.0 %    MCV 85.0 80.0 - 100.0 fL    MCH 28.1 26.0 - 34.0 pg    MCHC 33.1 31.0 - 36.0 g/dL    RDW 15.1 12.4 - 15.4 %    Platelets 882 582 - 664 K/uL    MPV 7.5 5.0 - 10.5 fL    Neutrophils % 66.4 %    Lymphocytes % 21.5 %    Monocytes % 8.8 %    Eosinophils % 2.5 %    Basophils % 0.8 %    Neutrophils # 4.3 1.7 - 7.7 K/uL PNEUMOTHORAX, PULMONARY EMBOLISM, and THORACIC DISSECTION. FINAL IMPRESSION  1. Unstable angina ([address]    2. Dyspnea and respiratory abnormalities    3. Other acute pulmonary embolism without acute cor pulmonale ([address]    4. Adverse effect of chemotherapy, initial encounter    5. H/O: lung cancer        Vitals:  Blood pressure 99/68, pulse 84, temperature 98.3 °F (36.8 °C), temperature source Oral, resp. rate (!) 31, height 5' 7\" (1.702 m), weight 182 lb (82.6 kg), SpO2 94 %. Radiology  Xr Chest Standard (2 Vw)    Result Date: 5/24/2019  EXAMINATION: TWO XRAY VIEWS OF THE CHEST 5/24/2019 6:44 pm COMPARISON: Chest CT 05/02/2019, chest radiograph 03/16/2019 HISTORY: ORDERING SYSTEM PROVIDED HISTORY: cp TECHNOLOGIST PROVIDED HISTORY: Reason for exam:->cp Ordering Physician Provided Reason for Exam: cp; cardiac hx; has lung cancer Acuity: Acute Type of Exam: Initial FINDINGS: Masslike airspace opacity in the paramediastinal left upper lobe. Otherwise clear lungs. No findings of pneumothorax or pleural effusion. Normal mediastinal, hilar, and cardiac contours. Coronary artery stent graft in place. No acute fractures nor suspicious osseous lesions. Included joints maintain anatomic alignment. 1. No acute findings in the chest. 2. Masslike airspace opacity in the paramediastinal left upper lobe suspicious for metastatic pulmonary or cathy disease. Nm Lung Vent/perfusion (vq)    Result Date: 5/24/2019  EXAMINATION: NUCLEAR MEDICINE VENTILATION PERFUSION SCAN. 5/24/2019 TECHNIQUE: 83.3 millicuries millicuries xenon 641 was administered via mask prior to planar imaging of the lungs in multiple projections. Then, 4.8 millicuries millicuries of Tc 15R MAA was administered intravenously prior to planar imaging of the lungs in similar projections. COMPARISON: Chest radiograph 05/24/2019.  HISTORY: ORDERING SYSTEM PROVIDED HISTORY: chest pain and SOB FINDINGS: PERFUSION: There is a focal segmental mismatched defect involving the superior segment of the right lower lobe, best visualized on the lateral perfusion images. There is a matched perfusion defect within the medial aspect of the left upper lung, likely corresponding to the mass seen on the recent CT. VENTILATION: Again, a matched defect is identified medially within the left upper lung. CHEST RADIOGRAPH: Masslike opacity identified within the medial aspect of left upper lung. The lungs are otherwise clear. Intermediate probability for pulmonary embolism, with a single mismatched segmental defect within the superior segment of the right lower lobe. EKG Interpretation. The Ekg interpreted by me in the absence of a cardiologist shows. normal sinus rhythm with a rate of 92  Axis is   Right axis deviation  QTc is  516 ms  Intervals and Durations are unremarkable. No specific ST-T wave changes appreciated. No evidence of acute ischemia.    No significant change from prior EKG dated 17 March 2019           Christine Kelley MD  05/24/19 [id number]

## 2019-05-25 VITALS
WEIGHT: 184.6 LBS | DIASTOLIC BLOOD PRESSURE: 67 MMHG | HEART RATE: 76 BPM | SYSTOLIC BLOOD PRESSURE: 104 MMHG | OXYGEN SATURATION: 90 % | TEMPERATURE: 98.4 F | BODY MASS INDEX: 28.97 KG/M2 | HEIGHT: 67 IN | RESPIRATION RATE: 16 BRPM

## 2019-05-25 LAB
D DIMER: <200 NG/ML DDU (ref 0–229)
GLUCOSE BLD-MCNC: 117 MG/DL (ref 70–99)
GLUCOSE BLD-MCNC: 123 MG/DL (ref 70–99)
GLUCOSE BLD-MCNC: 163 MG/DL (ref 70–99)
PERFORMED ON: ABNORMAL
TROPONIN: <0.01 NG/ML

## 2019-05-25 PROCEDURE — 6370000000 HC RX 637 (ALT 250 FOR IP): Performed by: INTERNAL MEDICINE

## 2019-05-25 PROCEDURE — 85379 FIBRIN DEGRADATION QUANT: CPT

## 2019-05-25 PROCEDURE — 94640 AIRWAY INHALATION TREATMENT: CPT

## 2019-05-25 PROCEDURE — 6360000002 HC RX W HCPCS: Performed by: INTERNAL MEDICINE

## 2019-05-25 PROCEDURE — 36415 COLL VENOUS BLD VENIPUNCTURE: CPT

## 2019-05-25 PROCEDURE — G0378 HOSPITAL OBSERVATION PER HR: HCPCS

## 2019-05-25 PROCEDURE — 96372 THER/PROPH/DIAG INJ SC/IM: CPT

## 2019-05-25 PROCEDURE — 84484 ASSAY OF TROPONIN QUANT: CPT

## 2019-05-25 PROCEDURE — 2580000003 HC RX 258: Performed by: INTERNAL MEDICINE

## 2019-05-25 PROCEDURE — 6370000000 HC RX 637 (ALT 250 FOR IP): Performed by: FAMILY MEDICINE

## 2019-05-25 RX ORDER — LEVOTHYROXINE SODIUM 0.1 MG/1
100 TABLET ORAL DAILY
Status: DISCONTINUED | OUTPATIENT
Start: 2019-05-25 | End: 2019-05-25 | Stop reason: HOSPADM

## 2019-05-25 RX ORDER — SODIUM CHLORIDE 0.9 % (FLUSH) 0.9 %
10 SYRINGE (ML) INJECTION EVERY 12 HOURS SCHEDULED
Status: DISCONTINUED | OUTPATIENT
Start: 2019-05-25 | End: 2019-05-25 | Stop reason: HOSPADM

## 2019-05-25 RX ORDER — DEXTROSE MONOHYDRATE 25 G/50ML
12.5 INJECTION, SOLUTION INTRAVENOUS PRN
Status: DISCONTINUED | OUTPATIENT
Start: 2019-05-25 | End: 2019-05-25 | Stop reason: HOSPADM

## 2019-05-25 RX ORDER — NITROGLYCERIN 0.4 MG/1
0.4 TABLET SUBLINGUAL EVERY 5 MIN PRN
Status: DISCONTINUED | OUTPATIENT
Start: 2019-05-25 | End: 2019-05-25 | Stop reason: HOSPADM

## 2019-05-25 RX ORDER — FLUTICASONE PROPIONATE 110 UG/1
1 AEROSOL, METERED RESPIRATORY (INHALATION) 2 TIMES DAILY
Status: DISCONTINUED | OUTPATIENT
Start: 2019-05-25 | End: 2019-05-25 | Stop reason: HOSPADM

## 2019-05-25 RX ORDER — ONDANSETRON 2 MG/ML
4 INJECTION INTRAMUSCULAR; INTRAVENOUS EVERY 6 HOURS PRN
Status: DISCONTINUED | OUTPATIENT
Start: 2019-05-25 | End: 2019-05-25 | Stop reason: HOSPADM

## 2019-05-25 RX ORDER — CLOPIDOGREL BISULFATE 75 MG/1
75 TABLET ORAL DAILY
Status: DISCONTINUED | OUTPATIENT
Start: 2019-05-25 | End: 2019-05-25 | Stop reason: HOSPADM

## 2019-05-25 RX ORDER — ALBUTEROL SULFATE 90 UG/1
2 AEROSOL, METERED RESPIRATORY (INHALATION) EVERY 6 HOURS PRN
Status: DISCONTINUED | OUTPATIENT
Start: 2019-05-25 | End: 2019-05-25 | Stop reason: HOSPADM

## 2019-05-25 RX ORDER — ASPIRIN 81 MG/1
81 TABLET ORAL DAILY
Status: DISCONTINUED | OUTPATIENT
Start: 2019-05-25 | End: 2019-05-25 | Stop reason: HOSPADM

## 2019-05-25 RX ORDER — BENZONATATE 100 MG/1
100 CAPSULE ORAL 2 TIMES DAILY PRN
Status: DISCONTINUED | OUTPATIENT
Start: 2019-05-25 | End: 2019-05-25 | Stop reason: HOSPADM

## 2019-05-25 RX ORDER — PANTOPRAZOLE SODIUM 40 MG/1
40 TABLET, DELAYED RELEASE ORAL
Status: DISCONTINUED | OUTPATIENT
Start: 2019-05-25 | End: 2019-05-25 | Stop reason: HOSPADM

## 2019-05-25 RX ORDER — METOPROLOL SUCCINATE 25 MG/1
25 TABLET, EXTENDED RELEASE ORAL DAILY
Status: DISCONTINUED | OUTPATIENT
Start: 2019-05-25 | End: 2019-05-25 | Stop reason: HOSPADM

## 2019-05-25 RX ORDER — SODIUM CHLORIDE 0.9 % (FLUSH) 0.9 %
10 SYRINGE (ML) INJECTION PRN
Status: DISCONTINUED | OUTPATIENT
Start: 2019-05-25 | End: 2019-05-25 | Stop reason: HOSPADM

## 2019-05-25 RX ORDER — ATORVASTATIN CALCIUM 40 MG/1
40 TABLET, FILM COATED ORAL DAILY
Status: DISCONTINUED | OUTPATIENT
Start: 2019-05-25 | End: 2019-05-25 | Stop reason: HOSPADM

## 2019-05-25 RX ORDER — HYDROCHLOROTHIAZIDE 25 MG/1
12.5 TABLET ORAL DAILY
Status: DISCONTINUED | OUTPATIENT
Start: 2019-05-25 | End: 2019-05-25 | Stop reason: HOSPADM

## 2019-05-25 RX ORDER — MORPHINE SULFATE 2 MG/ML
2 INJECTION, SOLUTION INTRAMUSCULAR; INTRAVENOUS EVERY 4 HOURS PRN
Status: DISCONTINUED | OUTPATIENT
Start: 2019-05-25 | End: 2019-05-25 | Stop reason: HOSPADM

## 2019-05-25 RX ORDER — HYDROXYZINE PAMOATE 25 MG/1
25 CAPSULE ORAL 4 TIMES DAILY PRN
Status: DISCONTINUED | OUTPATIENT
Start: 2019-05-25 | End: 2019-05-25 | Stop reason: HOSPADM

## 2019-05-25 RX ORDER — INSULIN GLARGINE 100 [IU]/ML
35 INJECTION, SOLUTION SUBCUTANEOUS NIGHTLY
Status: DISCONTINUED | OUTPATIENT
Start: 2019-05-25 | End: 2019-05-25 | Stop reason: HOSPADM

## 2019-05-25 RX ORDER — NICOTINE POLACRILEX 4 MG
15 LOZENGE BUCCAL PRN
Status: DISCONTINUED | OUTPATIENT
Start: 2019-05-25 | End: 2019-05-25 | Stop reason: HOSPADM

## 2019-05-25 RX ORDER — IBUPROFEN 400 MG/1
600 TABLET ORAL EVERY 6 HOURS PRN
Status: DISCONTINUED | OUTPATIENT
Start: 2019-05-25 | End: 2019-05-25 | Stop reason: HOSPADM

## 2019-05-25 RX ORDER — DEXTROSE MONOHYDRATE 50 MG/ML
100 INJECTION, SOLUTION INTRAVENOUS PRN
Status: DISCONTINUED | OUTPATIENT
Start: 2019-05-25 | End: 2019-05-25 | Stop reason: HOSPADM

## 2019-05-25 RX ADMIN — Medication 10 ML: at 09:01

## 2019-05-25 RX ADMIN — APIXABAN 10 MG: 5 TABLET, FILM COATED ORAL at 11:44

## 2019-05-25 RX ADMIN — HYDROCHLOROTHIAZIDE 12.5 MG: 25 TABLET ORAL at 09:01

## 2019-05-25 RX ADMIN — CLOPIDOGREL BISULFATE 75 MG: 75 TABLET ORAL at 09:01

## 2019-05-25 RX ADMIN — METOPROLOL SUCCINATE 25 MG: 25 TABLET, EXTENDED RELEASE ORAL at 09:01

## 2019-05-25 RX ADMIN — Medication 1 PUFF: at 07:49

## 2019-05-25 RX ADMIN — ASPIRIN 81 MG: 81 TABLET, COATED ORAL at 09:01

## 2019-05-25 RX ADMIN — ATORVASTATIN CALCIUM 40 MG: 40 TABLET, FILM COATED ORAL at 09:01

## 2019-05-25 RX ADMIN — ENOXAPARIN SODIUM 40 MG: 40 INJECTION SUBCUTANEOUS at 09:01

## 2019-05-25 RX ADMIN — LEVOTHYROXINE SODIUM 100 MCG: 100 TABLET ORAL at 05:28

## 2019-05-25 RX ADMIN — HYDROXYZINE PAMOATE 25 MG: 25 CAPSULE ORAL at 03:35

## 2019-05-25 RX ADMIN — PANTOPRAZOLE SODIUM 40 MG: 40 TABLET, DELAYED RELEASE ORAL at 05:29

## 2019-05-25 ASSESSMENT — PAIN SCALES - GENERAL
PAINLEVEL_OUTOF10: 0
PAINLEVEL_OUTOF10: 0

## 2019-05-25 NOTE — PROGRESS NOTES
RESPIRATORY THERAPY ASSESSMENT    Name:  Ramon Gordillo  Medical Record Number:  [id number]  Age: 53 y.o. Gender: female  : 1970  Today's Date:  2019  Room:  0202/0202-02    Assessment     Is the patient being admitted for a COPD or Asthma exacerbation? No   (If yes the patient will be seen every 4 hours for the first 24 hours and then reassessed)    Patient Admission Diagnosis      Allergies  Allergies   Allergen Reactions    Amoxicillin Anaphylaxis    Mushroom Extract Complex Anaphylaxis    Pcn [Penicillins] Other (See Comments)     Pt states that she was in a coma for 5 days    Coconut Oil     Iv Dye [Iodides]     Percocet [Oxycodone-Acetaminophen]      Tolerates Vicodin.  Shellfish-Derived Products     Tramadol     Tylenol With Codeine [Acetaminophen-Codeine]      Pt states she can take codeine, and take tylenol-just cant take the combination  Tolerates Vicodin. Minimum Predicted Vital Capacity:     n/a          Actual Vital Capacity:      Unable to assess, pt sleeping              Pulmonary History: lung CA, emphysema  Home Oxygen Therapy:  room air  Home Respiratory Therapy: Flovent BID, Spiriva Daily, Albuterol PRN   Current Respiratory Therapy:  Flovent BID, Spiriva Daily, Albuterol PRN          Respiratory Severity Index(RSI)   Patients with orders for inhalation medications, oxygen, or any therapeutic treatment modality will be placed on Respiratory Protocol. They will be assessed with the first treatment and at least every 72 hours thereafter. The following severity scale will be used to determine frequency of treatment intervention.     Smoking History: Pulmonary Disease or Smoking History, Greater than 15 pack year = 2    Social History  Social History     Tobacco Use    Smoking status: Current Every Day Smoker     Packs/day: 0.20     Years: 37.00     Pack years: 7.40     Types: Cigarettes    Smokeless tobacco: Never Used    Tobacco comment: 10/31/17 0.5ppd   Substance Use Topics    Alcohol use: Yes     Comment: rarely    Drug use: No       Recent Surgical History: None = 0  Past Surgical History  Past Surgical History:   Procedure Laterality Date    BACK SURGERY      HYSTERECTOMY      LUNG BIOPSY Left 11/13/2017       Level of Consciousness: Alert, Oriented, and Cooperative = 0    Level of Activity: Walking unassisted = 0    Respiratory Pattern: Regular Pattern; RR 8-20 = 0    Breath Sounds: Diminshed bilaterally and/or crackles = 2    Sputum   ,  ,    Cough: Strong, spontaneous, non-productive = 0    Vital Signs   /74   Pulse 84   Temp 98.4 °F (36.9 °C) (Oral)   Resp 16   Ht 5' 7\" (1.702 m)   Wt 184 lb 9.6 oz (83.7 kg)   SpO2 94%   BMI 28.91 kg/m²   SPO2 (COPD values may differ): Greater than or equal to 92% on room air = 0    Peak Flow (asthma only): not applicable = 0    RSI: 0-4 = See once and convert to home regimen or discontinue        Plan       Goals: medication delivery, mobilize retained secretions, volume expansion and improve oxygenation    Patient/caregiver was educated on the proper method of use for Respiratory Care Devices:  No:       Level of patient/caregiver understanding able to:   ? Verbalize understanding   ? Demonstrate understanding       ? Teach back        ? Needs reinforcement       ? No available caregiver               ? Other:     Response to education:       Is patient being placed on Home Treatment Regimen? Yes     Does the patient have everything they need prior to discharge? NA     Comments: Pt meds reviewed, pt sleeping at this time, IS and tobacco cessation left at bedside. Plan of Care: Flovent BID, Spiriva Daily, Albuterol PRN    Electronically signed by Tanya Cortez RCP on 5/25/2019 at 12:59 AM    Respiratory Protocol Guidelines     1. Assessment and treatment by Respiratory Therapy will be initiated for medication and therapeutic interventions upon initiation of aerosolized medication.   2. Physician will be experiences worsened bronchospasm, or secretions have lessened to the point that the patient is able to clear them with a cough. Anti-inflammatory and Combination Medications:    1. If the patient lacks prior history of lung disease, is not using inhaled anti-inflammatory medication at home, and lacks wheezing by examination or by history for at least 24 hours, contact physician for possible discontinuation.

## 2019-05-25 NOTE — PROGRESS NOTES
4 Eyes Skin Assessment     The patient is being assess for  Admission    I agree that 2 RN's have performed a thorough Head to Toe Skin Assessment on the patient. ALL assessment sites listed below have been assessed. Areas assessed by both nurses: Saranya Champagne  [x]   Head, Face, and Ears   [x]   Shoulders, Back, and Chest  [x]   Arms, Elbows, and Hands   [x]   Coccyx, Sacrum, and Ischum  [x]   Legs, Feet, and Heels        Does the Patient have Skin Breakdown?   No         Alonso Prevention initiated:  NA   Wound Care Orders initiated:  NA      [facility] nurse consulted for Pressure Injury (Stage 3,4, Unstageable, DTI, NWPT, and Complex wounds):  NA      Nurse 1 eSignature: Electronically signed by Jason Jules RN on 5/25/19 at 12:47 AM    **SHARE this note so that the co-signing nurse is able to place an eSignature**    Nurse 2 eSignature: Electronically signed by Saige Cevallos RN on 5/25/19 at 5:06 AM

## 2019-05-25 NOTE — H&P
25 Units into the skin 3 times daily (before meals) 3/18/19  Yes Jayde Solano MD   lansoprazole (PREVACID) 15 MG delayed release capsule Take 15 mg by mouth daily   Yes Historical Provider, MD   hydrOXYzine (VISTARIL) 25 MG capsule Take 25 mg by mouth 4 times daily as needed for Itching   Yes Historical Provider, MD   aspirin 81 MG tablet Take 81 mg by mouth daily   Yes Historical Provider, MD   clopidogrel (PLAVIX) 75 MG tablet Take 75 mg by mouth daily   Yes Historical Provider, MD   nitroGLYCERIN (NITROSTAT) 0.4 MG SL tablet Place 0.4 mg under the tongue every 5 minutes as needed for Chest pain (Daily as needed) up to max of 3 total doses. If no relief after 1 dose, call 911.    Yes Historical Provider, MD   atorvastatin (LIPITOR) 40 MG tablet Take 40 mg by mouth daily   Yes Historical Provider, MD   hydrochlorothiazide (HYDRODIURIL) 12.5 MG tablet Take 12.5 mg by mouth daily   Yes Historical Provider, MD   levothyroxine (SYNTHROID) 25 MCG tablet Take 100 mcg by mouth Daily    Yes Historical Provider, MD   benzonatate (TESSALON) 100 MG capsule Take 100 mg by mouth 2 times daily as needed for Cough   Yes Historical Provider, MD   ibuprofen (IBU) 600 MG tablet Take 1 tablet by mouth every 6 hours as needed for Pain 6/18/18  Yes Howie Rouse PA-C   fluticasone (FLOVENT HFA) 110 MCG/ACT inhaler Inhale 1 puff into the lungs 2 times daily   Yes Historical Provider, MD   albuterol sulfate  (90 Base) MCG/ACT inhaler Inhale 2 puffs into the lungs every 6 hours as needed for Wheezing   Yes Historical Provider, MD   Insulin Pen Needle (B-D ULTRAFINE III SHORT PEN) 31G X 8 MM MISC 1 each by Does not apply route daily 3/18/19   Jayde Solano MD   blood glucose test strips (ACCU-CHEK KRISTINA PLUS) strip Check qid 3/18/19   Jayde Solano MD   Blood Glucose Monitoring Suppl (ACCU-CHEK KRISTINA PLUS) w/Device KIT Check qid 3/18/19   Jayde Sloano MD   Blood Glucose Calibration (ACCU-CHEK KRISTINA) SOLN Check when you use a new box of strips 3/18/19   Elmer Ellsworth MD   ACCU-CHEYARELIS MULTICLIX LANCETS MISC Check qid 3/18/19   Elmer Ellsworth MD   tiotropium (SPIRIVA RESPIMAT) 2.5 MCG/ACT AERS inhaler Inhale 2 puffs into the lungs daily 10/31/17 11/30/17  Nate Pavon MD       Allergies:  Amoxicillin; Mushroom extract complex; Pcn [penicillins]; Coconut oil; Iv dye [iodides]; Percocet [oxycodone-acetaminophen]; Shellfish-derived products; Tramadol; and Tylenol with codeine [acetaminophen-codeine]    Social History:      The patient currently lives at home    TOBACCO:   reports that she has been smoking cigarettes. She has a 7.40 pack-year smoking history. She has never used smokeless tobacco.  ETOH:   reports that she drinks alcohol. Family History:   Positive as follows:        Problem Relation Age of Onset    Stroke Mother     Migraines Mother     Stroke Father     High Cholesterol Father     Depression Sister     Migraines Sister        REVIEW OF SYSTEMS:   Pertinent positives as noted in the HPI. All other systems reviewed and negative. PHYSICAL EXAM PERFORMED:    /74   Pulse 84   Temp 98.4 °F (36.9 °C) (Oral)   Resp 16   Ht 5' 7\" (1.702 m)   Wt 184 lb 9.6 oz (83.7 kg)   SpO2 94%   BMI 28.91 kg/m²     General appearance:  No apparent distress, appears stated age and cooperative. HEENT:  Normal cephalic, atraumatic without obvious deformity. Pupils equal, round, and reactive to light. Extra ocular muscles intact. Conjunctivae/corneas clear. Neck: Supple, with full range of motion. No jugular venous distention. Trachea midline. Respiratory:  Normal respiratory effort. Clear to auscultation, bilaterally without Rales/Wheezes/Rhonchi. Cardiovascular:  Regular rate and rhythm with normal S1/S2 without murmurs, rubs or gallops. Abdomen: Soft, non-tender, non-distended with normal bowel sounds.   Musculoskeletal:  No clubbing, cyanosis or edema bilaterally. Full range of motion without deformity. Skin: Skin color, texture, turgor normal.  No rashes or lesions. Neurologic:  Neurovascularly intact without any focal sensory/motor deficits. Cranial nerves: II-XII intact, grossly non-focal.  Psychiatric:  Alert and oriented, thought content appropriate, normal insight  Capillary Refill: Brisk,< 3 seconds   Peripheral Pulses: +2 palpable, equal bilaterally       Labs:     Recent Labs     05/24/19 1840   WBC 6.5   HGB 12.8   HCT 38.8        Recent Labs     05/24/19 1840      K 3.9      CO2 25   BUN 11   CREATININE <0.5*   CALCIUM 9.3     Recent Labs     05/24/19 1840   AST 11*   ALT 9*   BILITOT 0.3   ALKPHOS 78     Recent Labs     05/24/19 1840   INR 1.11     Recent Labs     05/24/19 1840   TROPONINI <0.01       Urinalysis:      Lab Results   Component Value Date    NITRU Negative 03/16/2019    WBCUA 20-50 03/16/2019    BACTERIA 2+ 09/20/2016    RBCUA 10-20 03/16/2019    BLOODU TRACE-INTACT 03/16/2019    SPECGRAV 1.010 03/16/2019    GLUCOSEU >=1000 03/16/2019       Radiology:     CXR: I have reviewed the CXR with the following interpretation: no acute process  EKG:  I have reviewed the EKG with the following interpretation: nsr, right axis deviation, no new ischemic changes    NM LUNG VENT/PERFUSION (VQ)   Final Result   Intermediate probability for pulmonary embolism, with a single mismatched   segmental defect within the superior segment of the right lower lobe. XR CHEST STANDARD (2 VW)   Final Result   1. No acute findings in the chest.   2. Masslike airspace opacity in the paramediastinal left upper lobe   suspicious for metastatic pulmonary or cathy disease. ASSESSMENT:PLAN:  Chest pain - r/o ACS. Initial troponin, EKG negative for any acute ischemia. Will trend troponin follow repeat EKG as needed. When necessary nitroglycerin and morphine. ? ac PE - intermediate probability of PE on VQ scan - Consult oncology               - We will get d-dimer and bilateral lower extremity venous Duplex    Small cell carcinoma - s/p chemo, f/u [facility]    Coronary artery disease s/p stents - stents in 2/2019 by Vinicio Roche in [facility]                                                           - continue DAPT, statin , BB    Type 2 diabetes mellitus - resume home Lantus and lispro, and sliding scale insulin with Accu-Cheks    Tobacco abuse - counseled cessation patient refused nicotine replacement therapy    Hypothyroidism - resume levothyroxine    DVT Prophylaxis: lmwh  Diet: DIET CARDIAC; Carb Control: 4 carb choices (60 gms)/meal  Code Status: Full Code    PT/OT Eval Status: NA    Joan Vera MD    Thank you No primary care provider on file. for the opportunity to be involved in this patient's care. If you have any questions or concerns please feel free to contact me at [phone].

## 2019-05-25 NOTE — PROGRESS NOTES
Pt d/c'd home. Removed peripheral IV and stopped bleeding. Catheter intact. Pt tolerated well. No redness noted at site. Notified [facility] and removed tele box. Reviewed d/c instructions, home meds, and  f/u information utilizing teach-back method. Scripts for Eliquis given to patient. Patient verbalized understanding. Patient discharged with all belongings.

## 2019-05-25 NOTE — PROGRESS NOTES
Sticky note placed to physicians \"Pt states she has a DNR order at home. But would like to re-visit code status and make some changes. Spiritual care consult placed to assist with advanced directives. Please advise. Thanks! \"

## 2019-05-25 NOTE — PROGRESS NOTES
Patient admitted to [place] [address] from ED  Patient oriented to room, call light, bed rails, phone, lights and bathroom. Patient instructed about the schedule of the day including: vital sign frequency, lab draws, possible tests, frequency of MD and staff rounds, including RN/MD rounding together at bedside, daily weights, and I &O's. Patient instructed about prescribed diet, how to use 8MENU, and television. Telemetry box 13 in place, patient aware of placement and reason. Bed locked, in lowest position, side rails up 2/4, call light within reach. Will continue to monitor.

## 2019-05-25 NOTE — CONSULTS
HEMATOLOGY/ONCOLOGY CONSULTATION:     5/25/2019 10:04 AM    REASON FOR CONSULT: Lung cancer    PROVIDERS:  No primary care provider on file. CHIEF COMPLAINT:     Chief Complaint   Patient presents with    Chest Pain     chest pain since 1800 while sitting down, reports feeling nausea/SOB/diaphoretic during event. received 1 nitro, 2 baby aspirin, zofran in route. cancer pt, chemo tx today. HISTORY OF PRESENT ILLNESS:     HPI:  53 WF with recurrent NSCLC who just started 3rd line gemzar yesterday. She also has pmh CAD with stents and DM2. She presented with chest pain and SOB 1 hour after finishing her chemotherapy. She had a V/Q scan in the ER which was intermediate suspcion for PE. She is now on Lovenox. Pt is agitated this AM she will miss her grandson's  graduation.      PAST MEDICAL HISTORY:     Past Medical History:   Diagnosis Date    Anemia     Cancer ([facility] 2016    non small cell lung cancer    Depression     Diabetes mellitus ([facility]     Emphysema     Emphysema of lung ([facility]     Hyperlipidemia     Hypoglycemia     Psychiatric problem     Seizures ([facility]     Type 2 diabetes mellitus with hyperglycemia, without long-term current use of insulin ([facility] [facility] 3/16/2019       PAST SURGICAL HISTORY:        Past Surgical History:   Procedure Laterality Date    BACK SURGERY      HYSTERECTOMY      LUNG BIOPSY Left 11/13/2017       SOCIAL HISTORY:     Social History     Socioeconomic History    Marital status:      Spouse name: Not on file    Number of children: Not on file    Years of education: Not on file    Highest education level: Not on file   Occupational History    Not on file   Social Needs    Financial resource strain: Not on file    Food insecurity:     Worry: Not on file     Inability: Not on file    Transportation needs:     Medical: Not on file     Non-medical: Not on file   Tobacco Use    Smoking status: Current Every Day Smoker     Packs/day: 0.20     Years: 37.00     Pack years: 7.40     Types: Cigarettes    Smokeless tobacco: Never Used    Tobacco comment: 10/31/17 0.5ppd   Substance and Sexual Activity    Alcohol use: Yes     Comment: rarely    Drug use: No    Sexual activity: Not on file   Lifestyle    Physical activity:     Days per week: Not on file     Minutes per session: Not on file    Stress: Not on file   Relationships    Social connections:     Talks on phone: Not on file     Gets together: Not on file     Attends [employer] service: Not on file     Active member of club or organization: Not on file     Attends meetings of clubs or organizations: Not on file     Relationship status: Not on file    Intimate partner violence:     Fear of current or ex partner: Not on file     Emotionally abused: Not on file     Physically abused: Not on file     Forced sexual activity: Not on file   Other Topics Concern    Not on file   Social History Narrative    Not on file       FAMILY HISTORY:     Family History   Problem Relation Age of Onset    Stroke Mother     Migraines Mother     Stroke Father     High Cholesterol Father     Depression Sister     Migraines Sister        ALLERGIES:     Allergies as of 05/24/2019 - Review Complete 05/24/2019   Allergen Reaction Noted    Amoxicillin Anaphylaxis 05/12/2011    Mushroom extract complex Anaphylaxis 09/07/2017    Pcn [penicillins] Other (See Comments) 05/12/2011    Coconut oil  11/26/2018    Iv dye [iodides]  08/22/2017    Percocet [oxycodone-acetaminophen]  10/22/2012    Shellfish-derived products  10/31/2017    Tramadol  10/01/2013    Tylenol with codeine [acetaminophen-codeine]  07/28/2011       MEDICATIONS:     No current facility-administered medications on file prior to encounter.       Current Outpatient Medications on File Prior to Encounter   Medication Sig Dispense Refill    OXYCODONE HCL, ABUSE DETER, PO Take 10 mg by mouth      metoprolol succinate (TOPROL XL) 25 MG extended release lungs daily 1 Inhaler 1     REVIEW OF SYSTEMS:       10 point ROS completed. Pertinent positives in HPI, otherwise negative. PHYSICAL EXAM:       Vitals:    05/25/19 0306   BP: (!) 93/52   Pulse: 78   Resp: 16   Temp: 98.7 °F (37.1 °C)   SpO2: 90%       General appearance: alert and cooperative  Head: Normocephalic, without obvious abnormality, atraumatic  Neck: No palpable lymphadenopathy in supraclavicular or cervical chains  Lungs: Clear to auscultation bilaterally, no audible rales, wheezes or crackles  Heart: Regular rate and rhythm, S1, S2 normal  Abdomen: Soft, non-tender; bowel sounds normal; no masses,  no organomegaly  Extremities: without cyanosis, clubbing, edema or asymmetry  Skin: No jaundice, purpura or petechiae      LABS:     Lab Results   Component Value Date    WBC 6.5 05/24/2019    HGB 12.8 05/24/2019    HCT 38.8 05/24/2019    MCV 85.0 05/24/2019     05/24/2019       Lab Results   Component Value Date    GLUCOSE 177 (H) 05/24/2019    BUN 11 05/24/2019    CREATININE <0.5 (L) 05/24/2019    K 3.9 05/24/2019    PHOS 2.9 10/26/2017       Lab Results   Component Value Date    ALKPHOS 78 05/24/2019    ALT 9 (L) 05/24/2019    AST 11 (L) 05/24/2019    BILITOT 0.3 05/24/2019    BILIDIR 0.12 11/09/2012    PROT 7.4 05/24/2019       Lab Results   Component Value Date    PROTIME 12.7 05/24/2019    INR 1.11 05/24/2019       IMAGING:     Xr Chest Standard (2 Vw)  Result Date: 5/24/2019  1. No acute findings in the chest. 2. Masslike airspace opacity in the paramediastinal left upper lobe suspicious for metastatic pulmonary or cathy disease. Nm Lung Vent/perfusion (vq)  Result Date: 5/24/2019  Intermediate probability for pulmonary embolism, with a single mismatched segmental defect within the superior segment of the right lower lobe. Ct Chest Abdomen Pelvis Wo Contrast  Result Date: 5/2/2019  1. New mass lesion anterior the left upper mediastinum.  2. Interval development of scarring in the medial upper left lung possibly related to post radiation pneumonitis. 3. The other focus of soft tissue infiltrate in the posterior mediastinum on the left described previously is lower density at slightly smaller than previously keeping with favorable interval response to therapy. 4. Emphysema. 5. Pulmonary nodules described on prior study are no longer appreciated. 6. Unremarkable CT abdomen and pelvis. ASSESSMENT:     Problem List Items Addressed This Visit     Dyspnea and respiratory abnormalities      Other Visit Diagnoses     Unstable angina (HCC)    -  Primary    Relevant Medications    metoprolol succinate (TOPROL XL) 25 MG extended release tablet    nitroglycerin (NITRO-BID) 2 % ointment 0.5 inch (Completed)    aspirin EC tablet 81 mg    atorvastatin (LIPITOR) tablet 40 mg    hydrochlorothiazide (HYDRODIURIL) tablet 12.5 mg    metoprolol succinate (TOPROL XL) extended release tablet 25 mg    enoxaparin (LOVENOX) injection 40 mg    nitroGLYCERIN (NITROSTAT) SL tablet 0.4 mg    Other acute pulmonary embolism without acute cor pulmonale (HCC)        Relevant Medications    metoprolol succinate (TOPROL XL) 25 MG extended release tablet    nitroglycerin (NITRO-BID) 2 % ointment 0.5 inch (Completed)    aspirin EC tablet 81 mg    atorvastatin (LIPITOR) tablet 40 mg    hydrochlorothiazide (HYDRODIURIL) tablet 12.5 mg    metoprolol succinate (TOPROL XL) extended release tablet 25 mg    enoxaparin (LOVENOX) injection 40 mg    nitroGLYCERIN (NITROSTAT) SL tablet 0.4 mg    Adverse effect of chemotherapy, initial encounter        H/O: lung cancer                    PLAN:     1.  Chest Pain  - V/Q scan (has contrast allergy) with intermediate risk for PE  - high risk with malignancy  - agree with empiric anticoagulation - pt on lovenox  - checking Dopplers  - if patient otherwise, ready for discharge and is high risk for clot, would be ok with d/c on NOAC and outpatient Doppler to allow her to make her grandson's ceremony - she already has follow up with me on Friday    2.  Recurrent NSCLC  - s/p previous chemoradiation and Opdivo  - disease progression 5/2019  - just started Gemzar yesterday  - next chemo due 5/31/19  - monitor CBC    Kylie Mann MD

## 2019-05-25 NOTE — PROGRESS NOTES
Pt refusing both lantus and humalog. States she has not been using any insulin for the past 5 days.  Will continue to check POC glucose as ordered

## 2019-05-26 NOTE — DISCHARGE SUMMARY
Hospital Medicine Discharge Summary    Patient ID: Maximo Raymond      Patient's PCP: No primary care provider on file. Admit Date: 5/24/2019     Discharge Date: 5/25/2019      Admitting Physician: Tej Atwood MD     Discharge Physician: Shasha Velazquez MD     Discharge Diagnoses: Active Hospital Problems    Diagnosis    Small cell carcinoma (HCC) [C80.1]    Coronary artery disease involving native coronary artery of native heart without angina pectoris [I25.10]    Chest pain [R07.9]    Type 2 diabetes mellitus with hyperglycemia, without long-term current use of insulin (HCC) [E11.65]    Tobacco abuse [Z72.0]       The patient was seen and examined on day of discharge and this discharge summary is in conjunction with any daily progress note from day of discharge. Hospital Course:     53 y.o. female who presented to [facility] with CP   patient presented to the ED today with complaints of \"chest pressure \"that is retrosternal in location, constant, 8 x 10 in intensity, no aggravating or relieving factors, patient reports that it feels like someone is choking her neck as well. Today she received her first dose of IV gemcitabine. About an hour after finishing her infusion she developed sudden onset of chest pain \"like a 500 pound man sitting on my chest\". Also c/o mild SOB.   Patient reports she has history of CAD status post 2 stents placed in February 2019 by Dr. Kelly Marsh [place]        ac PE - intermediate probability of PE on VQ scan  Chest pain is due to PE                - Consulted oncology- rec eliquis  ( discussed risks and benefits of NOAC , pt needs to be  DAPT due to recent cardiac stenting )               - bilateral lower extremity venous Duplex to be done as OP     Small cell carcinoma - s/p chemo, f/u [facility]     Coronary artery disease s/p stents - stents in 2/2019 by Jamie Steinberg in [facility]                                                           - continue DAPT, statin , BB     Type 2 diabetes mellitus - resume home Lantus and lispro, and sliding scale insulin with Accu-Cheks     Tobacco abuse - counseled cessation patient refused nicotine replacement therapy     Hypothyroidism - resume levothyroxine          Physical Exam Performed:     /67   Pulse 76   Temp 98.4 °F (36.9 °C) (Oral)   Resp 16   Ht 5' 7\" (1.702 m)   Wt 184 lb 9.6 oz (83.7 kg)   SpO2 90%   BMI 28.91 kg/m²       General appearance:  No apparent distress, appears stated age and cooperative. HEENT:  Normal cephalic, atraumatic without obvious deformity. Pupils equal, round, and reactive to light. Extra ocular muscles intact. Conjunctivae/corneas clear. Neck: Supple, with full range of motion. No jugular venous distention. Trachea midline. Respiratory:  Normal respiratory effort. Clear to auscultation, bilaterally without Rales/Wheezes/Rhonchi. Cardiovascular:  Regular rate and rhythm with normal S1/S2 without murmurs, rubs or gallops. Abdomen: Soft, non-tender, non-distended with normal bowel sounds. Musculoskeletal:  No clubbing, cyanosis or edema bilaterally. Full range of motion without deformity. Skin: Skin color, texture, turgor normal.  No rashes or lesions. Neurologic:  Neurovascularly intact without any focal sensory/motor deficits. Cranial nerves: II-XII intact, grossly non-focal.  Psychiatric:  Alert and oriented, thought content appropriate, normal insight  Capillary Refill: Brisk,< 3 seconds   Peripheral Pulses: +2 palpable, equal bilaterally       Labs:  For convenience and continuity at follow-up the following most recent labs are provided:      CBC:    Lab Results   Component Value Date    WBC 6.5 05/24/2019    HGB 12.8 05/24/2019    HCT 38.8 05/24/2019     05/24/2019       Renal:    Lab Results   Component Value Date     05/24/2019    K 3.9 05/24/2019    K 4.0 03/17/2019     05/24/2019    CO2 25 05/24/2019    BUN 11 05/24/2019    CREATININE <0.5 05/24/2019    CALCIUM 9.3 05/24/2019    PHOS 2.9 10/26/2017         Significant Diagnostic Studies    Radiology:   NM LUNG VENT/PERFUSION (VQ)   Final Result   Intermediate probability for pulmonary embolism, with a single mismatched   segmental defect within the superior segment of the right lower lobe. XR CHEST STANDARD (2 VW)   Final Result   1. No acute findings in the chest.   2. Masslike airspace opacity in the paramediastinal left upper lobe   suspicious for metastatic pulmonary or cathy disease.                 Consults:     IP CONSULT TO HOSPITALIST  IP CONSULT TO SPIRITUAL SERVICES    Disposition:  Home      Condition at Discharge: Stable    Discharge Instructions/Follow-up:  onc in 1 week     Code Status:  Prior     Activity: activity as tolerated    Diet: cardiac diet and diabetic diet      Discharge Medications:     Discharge Medication List as of 5/25/2019 11:13 AM           Details   apixaban (ELIQUIS STARTER PACK) 5 MG TABS tablet Take 10 mg (2 tablets) orally twice daily for 7 days, then take 5 mg (1 tablet) orally twice daily thereafter., Disp-74 tablet, R-0Print              Details   OXYCODONE HCL, ABUSE DETER, PO Take 10 mg by mouthHistorical Med      metoprolol succinate (TOPROL XL) 25 MG extended release tablet Take 25 mg by mouth dailyHistorical Med      insulin glargine (LANTUS SOLOSTAR) 100 UNIT/ML injection pen Inject 35 Units into the skin nightly, Disp-5 pen, R-2Normal      insulin lispro (HUMALOG KWIKPEN) 100 UNIT/ML pen Inject 25 Units into the skin 3 times daily (before meals), Disp-7 pen, R-3Normal      lansoprazole (PREVACID) 15 MG delayed release capsule Take 15 mg by mouth dailyHistorical Med      hydrOXYzine (VISTARIL) 25 MG capsule Take 25 mg by mouth 4 times daily as needed for ItchingHistorical Med      aspirin 81 MG tablet Take 81 mg by mouth dailyHistorical Med      clopidogrel (PLAVIX) 75 MG tablet Take 75 mg by mouth dailyHistorical Med      nitroGLYCERIN (NITROSTAT) 0.4 MG SL tablet Place 0.4 mg under the tongue every 5 minutes as needed for Chest pain (Daily as needed) up to max of 3 total doses. If no relief after 1 dose, call 911. Historical Med      atorvastatin (LIPITOR) 40 MG tablet Take 40 mg by mouth dailyHistorical Med      hydrochlorothiazide (HYDRODIURIL) 12.5 MG tablet Take 12.5 mg by mouth dailyHistorical Med      levothyroxine (SYNTHROID) 25 MCG tablet Take 100 mcg by mouth Daily Historical Med      benzonatate (TESSALON) 100 MG capsule Take 100 mg by mouth 2 times daily as needed for CoughHistorical Med      fluticasone (FLOVENT HFA) 110 MCG/ACT inhaler Inhale 1 puff into the lungs 2 times dailyHistorical Med      albuterol sulfate  (90 Base) MCG/ACT inhaler Inhale 2 puffs into the lungs every 6 hours as needed for WheezingHistorical Med      Insulin Pen Needle (B-D ULTRAFINE III SHORT PEN) 31G X 8 MM MISC DAILY Starting Mon 3/18/2019, Disp-100 each, R-3, Normal      blood glucose test strips (ACCU-CHEK KRISTINA PLUS) strip Disp-150 each, R-3, NormalCheck qid      Blood Glucose Monitoring Suppl (ACCU-CHEK KRISTINA PLUS) w/Device KIT Disp-1 kit, R-0, NormalCheck qid      Blood Glucose Calibration (ACCU-CHEK KRISTINA) SOLN Disp-1 each, R-0, NormalCheck when you use a new box of strips      ACCU-CHEK MULTICLIX LANCETS MISC Disp-200 each, R-2, NormalCheck qid      tiotropium (SPIRIVA RESPIMAT) 2.5 MCG/ACT AERS inhaler Inhale 2 puffs into the lungs daily, Disp-1 Inhaler, R-1Normal             Time Spent on discharge is more than 30 minutes in the examination, evaluation, counseling and review of medications and discharge plan. Signed:    Bony Zayas MD   5/25/2019      Thank you No primary care provider on file. for the opportunity to be involved in this patient's care. If you have any questions or concerns please feel free to contact me at [phone].

## 2019-05-29 ENCOUNTER — HOSPITAL ENCOUNTER (OUTPATIENT)
Dept: VASCULAR LAB | Age: 49
Discharge: HOME | End: 2019-05-29
Payer: COMMERCIAL

## 2019-05-29 PROCEDURE — 93970 EXTREMITY STUDY: CPT

## 2019-07-31 ENCOUNTER — TELEPHONE (OUTPATIENT)
Dept: SURGERY | Age: 49
End: 2019-07-31

## 2019-09-02 ENCOUNTER — HOSPITAL ENCOUNTER (EMERGENCY)
Age: 49
Discharge: HOME | End: 2019-09-02
Payer: MEDICARE

## 2019-09-02 VITALS
WEIGHT: 178 LBS | TEMPERATURE: 98.5 F | HEART RATE: 78 BPM | DIASTOLIC BLOOD PRESSURE: 78 MMHG | HEIGHT: 66 IN | SYSTOLIC BLOOD PRESSURE: 111 MMHG | RESPIRATION RATE: 16 BRPM | OXYGEN SATURATION: 95 % | BODY MASS INDEX: 28.61 KG/M2

## 2019-09-02 DIAGNOSIS — N30.01 ACUTE CYSTITIS WITH HEMATURIA: ICD-10-CM

## 2019-09-02 DIAGNOSIS — Z87.2 H/O: PSORIASIS: Primary | ICD-10-CM

## 2019-09-02 LAB
BILIRUBIN URINE: NEGATIVE
BLOOD, URINE: ABNORMAL
CLARITY: ABNORMAL
COLOR: YELLOW
EPITHELIAL CELLS, UA: ABNORMAL /HPF
GLUCOSE URINE: NEGATIVE MG/DL
KETONES, URINE: NEGATIVE MG/DL
LEUKOCYTE ESTERASE, URINE: ABNORMAL
MICROSCOPIC EXAMINATION: YES
NITRITE, URINE: NEGATIVE
PH UA: 6.5 (ref 5–8)
PROTEIN UA: 100 MG/DL
RBC UA: ABNORMAL /HPF (ref 0–2)
SPECIFIC GRAVITY UA: >=1.03 (ref 1–1.03)
URINE REFLEX TO CULTURE: YES
URINE TYPE: ABNORMAL
UROBILINOGEN, URINE: 0.2 E.U./DL
WBC UA: ABNORMAL /HPF (ref 0–5)

## 2019-09-02 PROCEDURE — 99282 EMERGENCY DEPT VISIT SF MDM: CPT

## 2019-09-02 PROCEDURE — 87086 URINE CULTURE/COLONY COUNT: CPT

## 2019-09-02 PROCEDURE — 81001 URINALYSIS AUTO W/SCOPE: CPT

## 2019-09-02 PROCEDURE — 6370000000 HC RX 637 (ALT 250 FOR IP): Performed by: NURSE PRACTITIONER

## 2019-09-02 RX ORDER — BETAMETHASONE DIPROPIONATE 0.5 MG/G
CREAM TOPICAL ONCE
Status: COMPLETED | OUTPATIENT
Start: 2019-09-02 | End: 2019-09-02

## 2019-09-02 RX ORDER — PREDNISONE 10 MG/1
TABLET ORAL
Qty: 30 TABLET | Refills: 0 | Status: SHIPPED | OUTPATIENT
Start: 2019-09-02 | End: 2019-09-12

## 2019-09-02 RX ORDER — NITROFURANTOIN 25; 75 MG/1; MG/1
100 CAPSULE ORAL 2 TIMES DAILY
Qty: 10 CAPSULE | Refills: 0 | Status: SHIPPED | OUTPATIENT
Start: 2019-09-02 | End: 2019-09-07

## 2019-09-02 RX ORDER — BETAMETHASONE DIPROPIONATE 0.5 MG/G
CREAM TOPICAL
Qty: 1 TUBE | Refills: 0 | Status: SHIPPED | OUTPATIENT
Start: 2019-09-02 | End: 2019-10-02

## 2019-09-02 RX ADMIN — BETAMETHASONE DIPROPIONATE: 0.5 CREAM TOPICAL at 18:16

## 2019-09-02 ASSESSMENT — ENCOUNTER SYMPTOMS
DIARRHEA: 0
VOMITING: 0
COUGH: 0
BACK PAIN: 0
SHORTNESS OF BREATH: 0
NAUSEA: 0
ABDOMINAL PAIN: 0

## 2019-09-02 ASSESSMENT — PAIN DESCRIPTION - ORIENTATION: ORIENTATION: UPPER

## 2019-09-02 ASSESSMENT — PAIN DESCRIPTION - PAIN TYPE: TYPE: CHRONIC PAIN

## 2019-09-02 ASSESSMENT — PAIN SCALES - GENERAL: PAINLEVEL_OUTOF10: 9

## 2019-09-02 ASSESSMENT — PAIN DESCRIPTION - LOCATION: LOCATION: BACK

## 2019-09-02 ASSESSMENT — PAIN DESCRIPTION - DESCRIPTORS: DESCRIPTORS: SHARP

## 2019-09-02 NOTE — ED PROVIDER NOTES
[address] ED  EMERGENCY DEPARTMENT ENCOUNTER        Pt Name: Piedad Real  MRN: [id number]  [place] 1970  Date of evaluation: 9/2/2019  Provider: ED Mahmood CNP  PCP: ED Hodges CNP    This patient was not seen and evaluated by the attending physician No att. providers found. CHIEF COMPLAINT       Chief Complaint   Patient presents with    Rash     pt states hx of psoriasis and was unabel to take medcation for it due to taking chemo, stopped chemo treatments and now wants medication for it, severe rash spots to lower legs    Dysuria     would like to be checked for UTI       HISTORY OF PRESENT ILLNESS   (Location/Symptom, Timing/Onset, Context/Setting, Quality, Duration, Modifying Factors, Severity)  Note limiting factors. Piedad Real is a 53 y.o. female resents the emergency department with complaints of a rash as well as dysuria. Patient reports for the last couple of months that she has had an exacerbation of her psoriasis. She reports that she was on chemo for lung cancer but about a month ago stopped taking chemo. She was unable to take steroids while she was on her chemotherapy but she is here now wishing to have medication to help with her psoriasis. She does report burning and itching to her legs. There is a rash to her legs, abdomen and forearms. She also reports some dysuria for the last 3 days. She did have some Bactrim at home that she has started taking. This does not seem to help. She does report urinary frequency. No fevers or chills. No flank pain. No nausea, vomiting or diarrhea. Nursing Notes were all reviewed and agreed with or any disagreements were addressed  in the HPI. REVIEW OF SYSTEMS    (2-9 systems for level 4, 10 or more for level 5)     Review of Systems   Constitutional: Negative for chills and fever. Respiratory: Negative for cough and shortness of breath. Cardiovascular: Negative for chest pain. Take 75 mg by mouth daily    FLUTICASONE (FLOVENT HFA) 110 MCG/ACT INHALER    Inhale 1 puff into the lungs 2 times daily    HYDROCHLOROTHIAZIDE (HYDRODIURIL) 12.5 MG TABLET    Take 12.5 mg by mouth daily    HYDROXYZINE (VISTARIL) 25 MG CAPSULE    Take 25 mg by mouth 4 times daily as needed for Itching    INSULIN GLARGINE (LANTUS SOLOSTAR) 100 UNIT/ML INJECTION PEN    Inject 35 Units into the skin nightly    INSULIN LISPRO (HUMALOG KWIKPEN) 100 UNIT/ML PEN    Inject 25 Units into the skin 3 times daily (before meals)    INSULIN PEN NEEDLE (B-D ULTRAFINE III SHORT PEN) 31G X 8 MM MISC    1 each by Does not apply route daily    LANSOPRAZOLE (PREVACID) 15 MG DELAYED RELEASE CAPSULE    Take 15 mg by mouth daily    LEVOTHYROXINE (SYNTHROID) 25 MCG TABLET    Take 100 mcg by mouth Daily     METOPROLOL SUCCINATE (TOPROL XL) 25 MG EXTENDED RELEASE TABLET    Take 25 mg by mouth daily    NITROGLYCERIN (NITROSTAT) 0.4 MG SL TABLET    Place 0.4 mg under the tongue every 5 minutes as needed for Chest pain (Daily as needed) up to max of 3 total doses. If no relief after 1 dose, call 911. OXYCODONE HCL, ABUSE DETER, PO    Take 10 mg by mouth    TIOTROPIUM (SPIRIVA RESPIMAT) 2.5 MCG/ACT AERS INHALER    Inhale 2 puffs into the lungs daily         ALLERGIES     Amoxicillin; Mushroom extract complex; Pcn [penicillins]; Coconut oil; Iv dye [iodides]; Percocet [oxycodone-acetaminophen];  Shellfish-derived products; Tramadol; and Tylenol with codeine [acetaminophen-codeine]    FAMILYHISTORY       Family History   Problem Relation Age of Onset    Stroke Mother     Migraines Mother     Stroke Father     High Cholesterol Father     Depression Sister     Migraines Sister           SOCIAL HISTORY       Social History     Socioeconomic History    Marital status:      Spouse name: None    Number of children: None    Years of education: None    Highest education level: None   Occupational History    None   Social Needs   

## 2019-09-03 LAB — URINE CULTURE, ROUTINE: NORMAL

## 2021-10-21 ENCOUNTER — HOSPITAL ENCOUNTER (OUTPATIENT)
Age: 51
Setting detail: OBSERVATION
Discharge: HOME | End: 2021-10-22
Attending: EMERGENCY MEDICINE | Admitting: INTERNAL MEDICINE
Payer: MEDICARE

## 2021-10-21 DIAGNOSIS — R07.9 CHEST PAIN, UNSPECIFIED TYPE: Primary | ICD-10-CM

## 2021-10-21 PROCEDURE — 99284 EMERGENCY DEPT VISIT MOD MDM: CPT

## 2021-10-21 PROCEDURE — 93005 ELECTROCARDIOGRAM TRACING: CPT | Performed by: EMERGENCY MEDICINE

## 2021-10-21 ASSESSMENT — PAIN SCALES - GENERAL: PAINLEVEL_OUTOF10: 4

## 2021-10-22 ENCOUNTER — APPOINTMENT (OUTPATIENT)
Dept: GENERAL RADIOLOGY | Age: 51
End: 2021-10-22
Payer: MEDICARE

## 2021-10-22 ENCOUNTER — APPOINTMENT (OUTPATIENT)
Dept: NUCLEAR MEDICINE | Age: 51
End: 2021-10-22
Payer: MEDICARE

## 2021-10-22 VITALS
DIASTOLIC BLOOD PRESSURE: 68 MMHG | TEMPERATURE: 98.4 F | BODY MASS INDEX: 28.88 KG/M2 | WEIGHT: 184 LBS | RESPIRATION RATE: 16 BRPM | HEIGHT: 67 IN | HEART RATE: 72 BPM | SYSTOLIC BLOOD PRESSURE: 102 MMHG | OXYGEN SATURATION: 95 %

## 2021-10-22 LAB
A/G RATIO: 1.3 (ref 1.1–2.2)
A/G RATIO: 1.4 (ref 1.1–2.2)
ALBUMIN SERPL-MCNC: 3.9 G/DL (ref 3.4–5)
ALBUMIN SERPL-MCNC: 4.4 G/DL (ref 3.4–5)
ALP BLD-CCNC: 110 U/L (ref 40–129)
ALP BLD-CCNC: 99 U/L (ref 40–129)
ALT SERPL-CCNC: 28 U/L (ref 10–40)
ALT SERPL-CCNC: 31 U/L (ref 10–40)
ANION GAP SERPL CALCULATED.3IONS-SCNC: 10 MMOL/L (ref 3–16)
ANION GAP SERPL CALCULATED.3IONS-SCNC: 11 MMOL/L (ref 3–16)
APTT: 39.4 SEC (ref 26.2–38.6)
AST SERPL-CCNC: 23 U/L (ref 15–37)
AST SERPL-CCNC: 26 U/L (ref 15–37)
BASOPHILS ABSOLUTE: 0.1 K/UL (ref 0–0.2)
BASOPHILS ABSOLUTE: 0.1 K/UL (ref 0–0.2)
BASOPHILS RELATIVE PERCENT: 0.9 %
BASOPHILS RELATIVE PERCENT: 1.2 %
BILIRUB SERPL-MCNC: 0.5 MG/DL (ref 0–1)
BILIRUB SERPL-MCNC: 0.6 MG/DL (ref 0–1)
BUN BLDV-MCNC: 6 MG/DL (ref 7–20)
BUN BLDV-MCNC: 6 MG/DL (ref 7–20)
CALCIUM SERPL-MCNC: 9.3 MG/DL (ref 8.3–10.6)
CALCIUM SERPL-MCNC: 9.7 MG/DL (ref 8.3–10.6)
CHLORIDE BLD-SCNC: 102 MMOL/L (ref 99–110)
CHLORIDE BLD-SCNC: 98 MMOL/L (ref 99–110)
CO2: 27 MMOL/L (ref 21–32)
CO2: 28 MMOL/L (ref 21–32)
CREAT SERPL-MCNC: 0.6 MG/DL (ref 0.6–1.1)
CREAT SERPL-MCNC: 0.6 MG/DL (ref 0.6–1.1)
EKG ATRIAL RATE: 77 BPM
EKG DIAGNOSIS: NORMAL
EKG P AXIS: 81 DEGREES
EKG P-R INTERVAL: 168 MS
EKG Q-T INTERVAL: 418 MS
EKG QRS DURATION: 104 MS
EKG QTC CALCULATION (BAZETT): 473 MS
EKG R AXIS: 95 DEGREES
EKG T AXIS: 76 DEGREES
EKG VENTRICULAR RATE: 77 BPM
EOSINOPHILS ABSOLUTE: 0.1 K/UL (ref 0–0.6)
EOSINOPHILS ABSOLUTE: 0.2 K/UL (ref 0–0.6)
EOSINOPHILS RELATIVE PERCENT: 1.6 %
EOSINOPHILS RELATIVE PERCENT: 2.8 %
GFR AFRICAN AMERICAN: >60
GFR AFRICAN AMERICAN: >60
GFR NON-AFRICAN AMERICAN: >60
GFR NON-AFRICAN AMERICAN: >60
GLOBULIN: 3 G/DL
GLOBULIN: 3.2 G/DL
GLUCOSE BLD-MCNC: 214 MG/DL (ref 70–99)
GLUCOSE BLD-MCNC: 227 MG/DL (ref 70–99)
GLUCOSE BLD-MCNC: 318 MG/DL (ref 70–99)
GLUCOSE BLD-MCNC: 331 MG/DL (ref 70–99)
GLUCOSE BLD-MCNC: 395 MG/DL (ref 70–99)
HCT VFR BLD CALC: 43.3 % (ref 36–48)
HCT VFR BLD CALC: 44.8 % (ref 36–48)
HEMOGLOBIN: 14.6 G/DL (ref 12–16)
HEMOGLOBIN: 15 G/DL (ref 12–16)
INR BLD: 1 (ref 0.88–1.12)
LYMPHOCYTES ABSOLUTE: 1.8 K/UL (ref 1–5.1)
LYMPHOCYTES ABSOLUTE: 2 K/UL (ref 1–5.1)
LYMPHOCYTES RELATIVE PERCENT: 21.8 %
LYMPHOCYTES RELATIVE PERCENT: 24.4 %
MAGNESIUM: 1.8 MG/DL (ref 1.8–2.4)
MCH RBC QN AUTO: 30 PG (ref 26–34)
MCH RBC QN AUTO: 30.2 PG (ref 26–34)
MCHC RBC AUTO-ENTMCNC: 33.4 G/DL (ref 31–36)
MCHC RBC AUTO-ENTMCNC: 33.8 G/DL (ref 31–36)
MCV RBC AUTO: 88.9 FL (ref 80–100)
MCV RBC AUTO: 90.3 FL (ref 80–100)
MONOCYTES ABSOLUTE: 0.6 K/UL (ref 0–1.3)
MONOCYTES ABSOLUTE: 0.6 K/UL (ref 0–1.3)
MONOCYTES RELATIVE PERCENT: 7 %
MONOCYTES RELATIVE PERCENT: 8.8 %
NEUTROPHILS ABSOLUTE: 4.6 K/UL (ref 1.7–7.7)
NEUTROPHILS ABSOLUTE: 6.2 K/UL (ref 1.7–7.7)
NEUTROPHILS RELATIVE PERCENT: 63.1 %
NEUTROPHILS RELATIVE PERCENT: 68.4 %
PDW BLD-RTO: 13.2 % (ref 12.4–15.4)
PDW BLD-RTO: 13.4 % (ref 12.4–15.4)
PERFORMED ON: ABNORMAL
PLATELET # BLD: 184 K/UL (ref 135–450)
PLATELET # BLD: 218 K/UL (ref 135–450)
PMV BLD AUTO: 8.8 FL (ref 5–10.5)
PMV BLD AUTO: 9.2 FL (ref 5–10.5)
POTASSIUM REFLEX MAGNESIUM: 3.4 MMOL/L (ref 3.5–5.1)
POTASSIUM REFLEX MAGNESIUM: 3.8 MMOL/L (ref 3.5–5.1)
PRO-BNP: 129 PG/ML (ref 0–124)
PROTHROMBIN TIME: 11.3 SEC (ref 9.9–12.7)
RBC # BLD: 4.87 M/UL (ref 4–5.2)
RBC # BLD: 4.96 M/UL (ref 4–5.2)
SARS-COV-2, NAAT: NOT DETECTED
SODIUM BLD-SCNC: 136 MMOL/L (ref 136–145)
SODIUM BLD-SCNC: 140 MMOL/L (ref 136–145)
TOTAL PROTEIN: 6.9 G/DL (ref 6.4–8.2)
TOTAL PROTEIN: 7.6 G/DL (ref 6.4–8.2)
TROPONIN: <0.01 NG/ML
TROPONIN: <0.01 NG/ML
WBC # BLD: 7.3 K/UL (ref 4–11)
WBC # BLD: 9.1 K/UL (ref 4–11)

## 2021-10-22 PROCEDURE — 83735 ASSAY OF MAGNESIUM: CPT

## 2021-10-22 PROCEDURE — 85610 PROTHROMBIN TIME: CPT

## 2021-10-22 PROCEDURE — G0378 HOSPITAL OBSERVATION PER HR: HCPCS

## 2021-10-22 PROCEDURE — 87635 SARS-COV-2 COVID-19 AMP PRB: CPT

## 2021-10-22 PROCEDURE — 2580000003 HC RX 258: Performed by: INTERNAL MEDICINE

## 2021-10-22 PROCEDURE — 93010 ELECTROCARDIOGRAM REPORT: CPT | Performed by: INTERNAL MEDICINE

## 2021-10-22 PROCEDURE — 36415 COLL VENOUS BLD VENIPUNCTURE: CPT

## 2021-10-22 PROCEDURE — 83880 ASSAY OF NATRIURETIC PEPTIDE: CPT

## 2021-10-22 PROCEDURE — 6370000000 HC RX 637 (ALT 250 FOR IP): Performed by: INTERNAL MEDICINE

## 2021-10-22 PROCEDURE — 80053 COMPREHEN METABOLIC PANEL: CPT

## 2021-10-22 PROCEDURE — 85025 COMPLETE CBC W/AUTO DIFF WBC: CPT

## 2021-10-22 PROCEDURE — 71045 X-RAY EXAM CHEST 1 VIEW: CPT

## 2021-10-22 PROCEDURE — 99214 OFFICE O/P EST MOD 30 MIN: CPT | Performed by: INTERNAL MEDICINE

## 2021-10-22 PROCEDURE — 3430000000 HC RX DIAGNOSTIC RADIOPHARMACEUTICAL: Performed by: EMERGENCY MEDICINE

## 2021-10-22 PROCEDURE — A9540 TC99M MAA: HCPCS | Performed by: EMERGENCY MEDICINE

## 2021-10-22 PROCEDURE — 85730 THROMBOPLASTIN TIME PARTIAL: CPT

## 2021-10-22 PROCEDURE — 84484 ASSAY OF TROPONIN QUANT: CPT

## 2021-10-22 PROCEDURE — 78582 LUNG VENTILAT&PERFUS IMAGING: CPT

## 2021-10-22 PROCEDURE — A9558 XE133 XENON 10MCI: HCPCS | Performed by: EMERGENCY MEDICINE

## 2021-10-22 RX ORDER — POTASSIUM CHLORIDE 7.45 MG/ML
10 INJECTION INTRAVENOUS PRN
Status: DISCONTINUED | OUTPATIENT
Start: 2021-10-22 | End: 2021-10-22 | Stop reason: HOSPADM

## 2021-10-22 RX ORDER — NICOTINE POLACRILEX 4 MG
15 LOZENGE BUCCAL PRN
Status: DISCONTINUED | OUTPATIENT
Start: 2021-10-22 | End: 2021-10-22 | Stop reason: HOSPADM

## 2021-10-22 RX ORDER — NITROGLYCERIN 0.4 MG/1
0.4 TABLET SUBLINGUAL EVERY 5 MIN PRN
Status: DISCONTINUED | OUTPATIENT
Start: 2021-10-22 | End: 2021-10-22 | Stop reason: HOSPADM

## 2021-10-22 RX ORDER — DEXTROSE MONOHYDRATE 50 MG/ML
100 INJECTION, SOLUTION INTRAVENOUS PRN
Status: DISCONTINUED | OUTPATIENT
Start: 2021-10-22 | End: 2021-10-22 | Stop reason: HOSPADM

## 2021-10-22 RX ORDER — ASPIRIN 81 MG/1
81 TABLET ORAL DAILY
Status: DISCONTINUED | OUTPATIENT
Start: 2021-10-22 | End: 2021-10-22 | Stop reason: HOSPADM

## 2021-10-22 RX ORDER — PROMETHAZINE HYDROCHLORIDE 25 MG/1
12.5 TABLET ORAL EVERY 6 HOURS PRN
Status: DISCONTINUED | OUTPATIENT
Start: 2021-10-22 | End: 2021-10-22 | Stop reason: HOSPADM

## 2021-10-22 RX ORDER — ASPIRIN 325 MG
325 TABLET ORAL ONCE
Status: COMPLETED | OUTPATIENT
Start: 2021-10-22 | End: 2021-10-22

## 2021-10-22 RX ORDER — ATORVASTATIN CALCIUM 40 MG/1
40 TABLET, FILM COATED ORAL DAILY
Status: DISCONTINUED | OUTPATIENT
Start: 2021-10-22 | End: 2021-10-22 | Stop reason: HOSPADM

## 2021-10-22 RX ORDER — INSULIN GLARGINE 100 [IU]/ML
20 INJECTION, SOLUTION SUBCUTANEOUS NIGHTLY
Status: DISCONTINUED | OUTPATIENT
Start: 2021-10-22 | End: 2021-10-22

## 2021-10-22 RX ORDER — SODIUM CHLORIDE 0.9 % (FLUSH) 0.9 %
10 SYRINGE (ML) INJECTION EVERY 12 HOURS SCHEDULED
Status: DISCONTINUED | OUTPATIENT
Start: 2021-10-22 | End: 2021-10-22 | Stop reason: HOSPADM

## 2021-10-22 RX ORDER — ACETAMINOPHEN 325 MG/1
650 TABLET ORAL EVERY 6 HOURS PRN
Status: DISCONTINUED | OUTPATIENT
Start: 2021-10-22 | End: 2021-10-22 | Stop reason: HOSPADM

## 2021-10-22 RX ORDER — ONDANSETRON 2 MG/ML
4 INJECTION INTRAMUSCULAR; INTRAVENOUS EVERY 6 HOURS PRN
Status: DISCONTINUED | OUTPATIENT
Start: 2021-10-22 | End: 2021-10-22 | Stop reason: HOSPADM

## 2021-10-22 RX ORDER — CLOPIDOGREL BISULFATE 75 MG/1
75 TABLET ORAL DAILY
Status: DISCONTINUED | OUTPATIENT
Start: 2021-10-22 | End: 2021-10-22 | Stop reason: HOSPADM

## 2021-10-22 RX ORDER — SODIUM CHLORIDE 0.9 % (FLUSH) 0.9 %
10 SYRINGE (ML) INJECTION PRN
Status: DISCONTINUED | OUTPATIENT
Start: 2021-10-22 | End: 2021-10-22 | Stop reason: HOSPADM

## 2021-10-22 RX ORDER — SODIUM CHLORIDE 9 MG/ML
25 INJECTION, SOLUTION INTRAVENOUS PRN
Status: DISCONTINUED | OUTPATIENT
Start: 2021-10-22 | End: 2021-10-22 | Stop reason: HOSPADM

## 2021-10-22 RX ORDER — POTASSIUM CHLORIDE 20 MEQ/1
20 TABLET, EXTENDED RELEASE ORAL ONCE
Status: COMPLETED | OUTPATIENT
Start: 2021-10-22 | End: 2021-10-22

## 2021-10-22 RX ORDER — XENON XE-133 10 MCI/1
14 GAS RESPIRATORY (INHALATION)
Status: DISCONTINUED | OUTPATIENT
Start: 2021-10-22 | End: 2021-10-22

## 2021-10-22 RX ORDER — DEXTROSE MONOHYDRATE 25 G/50ML
12.5 INJECTION, SOLUTION INTRAVENOUS PRN
Status: DISCONTINUED | OUTPATIENT
Start: 2021-10-22 | End: 2021-10-22 | Stop reason: HOSPADM

## 2021-10-22 RX ORDER — LEVOTHYROXINE SODIUM 0.1 MG/1
100 TABLET ORAL DAILY
Status: DISCONTINUED | OUTPATIENT
Start: 2021-10-22 | End: 2021-10-22 | Stop reason: HOSPADM

## 2021-10-22 RX ORDER — MAGNESIUM SULFATE IN WATER 40 MG/ML
2000 INJECTION, SOLUTION INTRAVENOUS PRN
Status: DISCONTINUED | OUTPATIENT
Start: 2021-10-22 | End: 2021-10-22 | Stop reason: HOSPADM

## 2021-10-22 RX ORDER — INSULIN GLARGINE 100 [IU]/ML
20 INJECTION, SOLUTION SUBCUTANEOUS EVERY MORNING
Status: DISCONTINUED | OUTPATIENT
Start: 2021-10-22 | End: 2021-10-22 | Stop reason: HOSPADM

## 2021-10-22 RX ORDER — ACETAMINOPHEN 650 MG/1
650 SUPPOSITORY RECTAL EVERY 6 HOURS PRN
Status: DISCONTINUED | OUTPATIENT
Start: 2021-10-22 | End: 2021-10-22 | Stop reason: HOSPADM

## 2021-10-22 RX ADMIN — APIXABAN 5 MG: 5 TABLET, FILM COATED ORAL at 08:42

## 2021-10-22 RX ADMIN — Medication 5 MILLICURIE: at 12:41

## 2021-10-22 RX ADMIN — POTASSIUM CHLORIDE 20 MEQ: 20 TABLET, EXTENDED RELEASE ORAL at 13:36

## 2021-10-22 RX ADMIN — INSULIN GLARGINE 20 UNITS: 100 INJECTION, SOLUTION SUBCUTANEOUS at 05:35

## 2021-10-22 RX ADMIN — INSULIN LISPRO 4 UNITS: 100 INJECTION, SOLUTION INTRAVENOUS; SUBCUTANEOUS at 13:40

## 2021-10-22 RX ADMIN — LEVOTHYROXINE SODIUM 100 MCG: 0.1 TABLET ORAL at 05:34

## 2021-10-22 RX ADMIN — Medication 325 MG: at 01:57

## 2021-10-22 RX ADMIN — CLOPIDOGREL BISULFATE 75 MG: 75 TABLET ORAL at 08:42

## 2021-10-22 RX ADMIN — Medication 10 ML: at 08:43

## 2021-10-22 RX ADMIN — ATORVASTATIN CALCIUM 40 MG: 40 TABLET, FILM COATED ORAL at 08:42

## 2021-10-22 RX ADMIN — INSULIN LISPRO 8 UNITS: 100 INJECTION, SOLUTION INTRAVENOUS; SUBCUTANEOUS at 05:34

## 2021-10-22 RX ADMIN — ASPIRIN 81 MG: 81 TABLET, COATED ORAL at 08:42

## 2021-10-22 ASSESSMENT — PAIN DESCRIPTION - LOCATION: LOCATION: BACK;SHOULDER

## 2021-10-22 ASSESSMENT — PAIN SCALES - GENERAL
PAINLEVEL_OUTOF10: 5
PAINLEVEL_OUTOF10: 0
PAINLEVEL_OUTOF10: 0

## 2021-10-22 ASSESSMENT — PAIN DESCRIPTION - PAIN TYPE: TYPE: CHRONIC PAIN

## 2021-10-22 NOTE — PROGRESS NOTES
Pt stated she doesn't take any meds at home except aspirin. He doesn't have meds prescriptions. Provider made aware to send prescription for other meds to pts pharmacy.

## 2021-10-22 NOTE — CONSULTS
[address] [facility]  [phone]      Attending Physician: Rae Bernstein MD  Reason for Consultation/Chief Complaint: CP    Subjective   History of Present Illness:  Kyra Lange is a 53 y.o. patient who presented to the hospital with complaints of pain. Patient states she had this chest pain for months 2 weeks but got significantly worse yesterday when she was having an argument with her grandson. She states she is to kind of chest pain once in the middle of her chest as pressure and heaviness and another that is in her back left scapula that is sharp and stabbing in nature. She states both occur randomly but is worse with walking up or three flights of stairs at her apartment complex they last a few minutes and go away. Otherwise nothing triggers them there is nothing specifically that makes them better other than rest.    Past Medical History:   has a past medical history of Anemia, Cancer ([address] Depression, Diabetes mellitus ([address] Emphysema, Emphysema of lung ([address] Hyperlipidemia, Hypoglycemia, Psychiatric problem, Seizures ([address] and Type 2 diabetes mellitus with hyperglycemia, without long-term current use of insulin ([address] Surgical History:   has a past surgical history that includes Hysterectomy; back surgery; and Lung biopsy (Left, 11/13/2017). Social History:   reports that she has been smoking cigarettes. She has a 7.40 pack-year smoking history. She has never used smokeless tobacco. She reports previous alcohol use. She reports that she does not use drugs. Family History:  family history includes Depression in her sister; High Cholesterol in her father; Migraines in her mother and sister; Stroke in her father and mother. Home Medications:  Were reviewed and are listed in nursing record and/or below  Prior to Admission medications    Medication Sig Start Date End Date Taking?  Authorizing Provider   apixaban (ELIQUIS STARTER PACK) 5 MG TABS tablet Take 10 mg (2 tablets) orally twice daily for 7 days, then take 5 mg (1 tablet) orally twice daily thereafter. 5/25/19   Bee Villafuerte MD   OXYCODONE HCL, ABUSE DETER, PO Take 10 mg by mouth    Historical Provider, MD   metoprolol succinate (TOPROL XL) 25 MG extended release tablet Take 25 mg by mouth daily    Historical Provider, MD   insulin glargine (LANTUS SOLOSTAR) 100 UNIT/ML injection pen Inject 35 Units into the skin nightly 3/18/19   Dulce Judge MD   insulin lispro (HUMALOG KWIKPEN) 100 UNIT/ML pen Inject 25 Units into the skin 3 times daily (before meals) 3/18/19   Dulce Judge MD   Insulin Pen Needle (B-D ULTRAFINE III SHORT PEN) 31G X 8 MM MISC 1 each by Does not apply route daily 3/18/19   Dulce Judge MD   blood glucose test strips (ACCU-CHEK KRISTINA PLUS) strip Check qid 3/18/19   Dulce Judge MD   Blood Glucose Monitoring Suppl (ACCU-CHEK KRISTINA PLUS) w/Device KIT Check qid 3/18/19   Dulce Judge MD   Blood Glucose Calibration (ACCU-CHEK KRISTINA) SOLN Check when you use a new box of strips 3/18/19   Dulce Judge MD   ACCU-CHEK MULTICLIX LANCETS MISC Check qid 3/18/19   Dulce Judge MD   lansoprazole (PREVACID) 15 MG delayed release capsule Take 15 mg by mouth daily    Historical Provider, MD   hydrOXYzine (VISTARIL) 25 MG capsule Take 25 mg by mouth 4 times daily as needed for Itching    Historical Provider, MD   aspirin 81 MG tablet Take 81 mg by mouth daily    Historical Provider, MD   clopidogrel (PLAVIX) 75 MG tablet Take 75 mg by mouth daily    Historical Provider, MD   nitroGLYCERIN (NITROSTAT) 0.4 MG SL tablet Place 0.4 mg under the tongue every 5 minutes as needed for Chest pain (Daily as needed) up to max of 3 total doses. If no relief after 1 dose, call 911.     Historical Provider, MD   atorvastatin (LIPITOR) 40 MG tablet Take 40 mg by mouth daily    Historical Provider, MD   hydrochlorothiazide (HYDRODIURIL) 12.5 MG tablet Take 12.5 mg by mouth daily    Historical Provider, MD   levothyroxine (SYNTHROID) 25 MCG tablet Take 100 mcg by mouth Daily     Historical Provider, MD   benzonatate (TESSALON) 100 MG capsule Take 100 mg by mouth 2 times daily as needed for Cough    Historical Provider, MD   fluticasone (FLOVENT HFA) 110 MCG/ACT inhaler Inhale 1 puff into the lungs 2 times daily    Historical Provider, MD   albuterol sulfate  (90 Base) MCG/ACT inhaler Inhale 2 puffs into the lungs every 6 hours as needed for Wheezing    Historical Provider, MD   tiotropium (SPIRIVA RESPIMAT) 2.5 MCG/ACT AERS inhaler Inhale 2 puffs into the lungs daily 10/31/17 11/30/17  Katie Matt MD        CURRENT Medications:  nitroGLYCERIN (NITROSTAT) SL tablet 0.4 mg, Q5 Min PRN  insulin lispro (HUMALOG) injection vial 10 Units, Once  sodium chloride flush 0.9 % injection 10 mL, 2 times per day  sodium chloride flush 0.9 % injection 10 mL, PRN  0.9 % sodium chloride infusion, PRN  potassium chloride 10 mEq/100 mL IVPB (Peripheral Line), PRN  magnesium sulfate 2000 mg in 50 mL IVPB premix, PRN  promethazine (PHENERGAN) tablet 12.5 mg, Q6H PRN   Or  ondansetron (ZOFRAN) injection 4 mg, Q6H PRN  acetaminophen (TYLENOL) tablet 650 mg, Q6H PRN   Or  acetaminophen (TYLENOL) suppository 650 mg, Q6H PRN  glucose (GLUTOSE) 40 % oral gel 15 g, PRN  dextrose 50 % IV solution, PRN  glucagon (rDNA) injection 1 mg, PRN  dextrose 5 % solution, PRN  insulin lispro (HUMALOG) injection vial 0-12 Units, Q4H  apixaban (ELIQUIS) tablet 5 mg, BID  atorvastatin (LIPITOR) tablet 40 mg, Daily  aspirin EC tablet 81 mg, Daily  clopidogrel (PLAVIX) tablet 75 mg, Daily  levothyroxine (SYNTHROID) tablet 100 mcg, Daily  insulin glargine (LANTUS) injection vial 20 Units, QAM  influenza quadrivalent split vaccine (FLUZONE;FLUARIX;FLULAVAL;AFLURIA) injection 0.5 mL, Prior to discharge  perflutren lipid microspheres (DEFINITY) injection 1.65 mg, ONCE PRN        Allergies:  Amoxicillin, Mushroom extract complex, Pcn [penicillins], Coconut oil, Iv dye [iodides], Percocet [oxycodone-acetaminophen], Shellfish-derived products, Tramadol, and Tylenol with codeine [acetaminophen-codeine]     Review of Systems:   A 14 point review of symptoms completed. Pertinent positives identified in the HPI, all other review of symptoms negative as below.       Objective   PHYSICAL EXAM:    Vitals:    10/22/21 0840   BP: 97/66   Pulse: 69   Resp: 16   Temp: 98.5 °F (36.9 °C)   SpO2: 90%    Weight: 184 lb (83.5 kg)         General Appearance:  Alert, cooperative, no distress, appears stated age   Head:  Normocephalic, without obvious abnormality, atraumatic   Eyes:  PERRL, conjunctiva/corneas clear   Nose: Nares normal, no drainage or sinus tenderness   Throat: Lips, mucosa, and tongue normal   Neck: Supple, symmetrical, trachea midline, no adenopathy, thyroid: not enlarged, symmetric, no tenderness/mass/nodules, no carotid bruit or JVD   Lungs:   Clear to auscultation bilaterally, respirations unlabored   Chest Wall:  No deformity or tenderness   Heart:  Regular rate and rhythm, S1, S2 normal, no murmur, rub or gallop   Abdomen:   Soft, non-tender, bowel sounds active all four quadrants,  no masses, no organomegaly   Extremities: Extremities normal, atraumatic, no cyanosis or edema   Pulses: 2+ and symmetric   Skin: Skin color, texture, turgor normal, no rashes or lesions   Pysch: Normal mood and affect   Neurologic: Normal gross motor and sensory exam.         Labs   CBC:   Lab Results   Component Value Date    WBC 7.3 10/22/2021    RBC 4.87 10/22/2021    RBC 5.20 08/21/2017    HGB 14.6 10/22/2021    HCT 43.3 10/22/2021    MCV 88.9 10/22/2021    RDW 13.4 10/22/2021     10/22/2021     CMP:  Lab Results   Component Value Date     10/22/2021    K 3.4 10/22/2021     10/22/2021    CO2 27 10/22/2021    BUN 6 10/22/2021    CREATININE 0.6 10/22/2021    GFRAA >60 10/22/2021    GFRAA >60 03/16/2013    AGRATIO 1.3 10/22/2021    LABGLOM >60 10/22/2021    GLUCOSE 331 10/22/2021    GLUCOSE 192 08/21/2017    PROT 6.9 10/22/2021    PROT 8.3 08/21/2017    CALCIUM 9.3 10/22/2021    BILITOT 0.6 10/22/2021    ALKPHOS 99 10/22/2021    AST 23 10/22/2021    ALT 28 10/22/2021     PT/INR:  No results found for: PTINR  HgBA1c:  Lab Results   Component Value Date    LABA1C 14.9 03/17/2019     Lab Results   Component Value Date    TROPONINI <0.01 10/22/2021         Cardiac Data     Last EKG:   10/21/2021 NSR with anterolateral infarct    Echo: 7/2017   Technical difficult study due to body habitus and patient is a smoker. Patient refused Definity contrast.   Left ventricular systolic function is grossly normal with an ejection   fraction of 55-60%. No wall motion abnormalities noted. Grade I diastolic dysfunction with normal filling pressure. ECHO 11/2018 [facility]  Study Conclusions    - Left ventricle: The cavity size was normal. Wall thickness was    normal. Systolic function was normal. The calculated ejection    fraction was in the range of 65% to 70%. Wall motion was normal;    there were no regional wall motion abnormalities. Normal    diastolic function.  - Atrial septum: Agitated saline contrast study showed no    right-to-left atrial level shunt. - Inferior vena cava: The vessel was normal in size; the    respirophasic diameter changes were in the normal range (= 50%);    findings are consistent with normal central venous pressure. Stress Test:    Cath:    Studies:   CXR:  Full inflation of the lungs but no pneumonia or CHF.             Assessment and Plan      1. Chest pain  2. CAD   - hx of PCI to LAD  3. Ischemic cardiomyopathy  4. Chronic Systolic CHF   S/P ICD: followed by Dr. Laura Garcia at [id number] [facility]  5. Abnormal ECG  6. Hypokalemia  Hx of Lung cancer      PLAN  1. Patient ruled out for MI with 2 negative troponins there are delta 6 hours.   2.  Signs or symptoms of active heart failure with negative chest x-ray and BNP. 3.  Patient had a VQ scan today and therefore cannot have a chemical stress test for 48 additional hours. Due to ICD/bundle cannot get dobutamine stress echo and she cannot run on the treadmill. 4.  Review of notes from last year by Dr. Syed Burnham suggests her chest pain could be somewhat chronic in nature. 5.  At this time I will continue current home medications. She is okay for discharge at low risk at this point we'll should follow-up with Dr. Christine Rivera. Will s/o     Follows with Dr. Janusz Gramajo in [facility]. Patient Active Problem List   Diagnosis    Mood swings    Cervical lymphadenopathy    Tobacco abuse    Lung mass    Mediastinal adenopathy    Iatrogenic pneumothorax    Atelectasis    Panlobular emphysema (HCC)    Chest pain    Type 2 diabetes mellitus with hyperglycemia, without long-term current use of insulin (HCC)    Dyspnea and respiratory abnormalities    Hyperglycemia    Coronary artery disease involving native coronary artery of native heart without angina pectoris    Small cell carcinoma ([facility]           Thank you for allowing us to participate in the care of [facility]. Please call me with any questions [phone]. Robles Tyler MD, [address] [facility] Cardiologist  [place] [address]  [phone] [facility]  [phone] [address] [place] [address]  10/22/2021 12:10 PM    I will address the patient's cardiac risk factors and adjusted pharmacologic treatment as needed. In addition, I have reinforced the need for patient directed risk factor modification. All questions and concerns were addressed to the patient/family. Alternatives to my treatment were discussed. The note was completed using EMR. Every effort was made to ensure accuracy; however, inadvertent computerized transcription errors may be present.

## 2021-10-22 NOTE — ED PROVIDER NOTES
[facility]  ED  EMERGENCY DEPARTMENT ENCOUNTER      Pt Name: Shonda Littlejohn  MRN: [id number]  [place] 1970  Date of evaluation: 10/21/2021  Provider: Annette Heredia MD    CHIEF COMPLAINT       Chief Complaint   Patient presents with    Chest Pain     for 3 days, has left side def from previous stroke         HISTORY OF PRESENT ILLNESS   (Location/Symptom, Timing/Onset, Context/Setting, Quality, Duration, Modifying Factors, Severity)  Note limiting factors. Shonda Littlejohn is a 53 y.o. female with past medical history of hyperlipidemia, hypertension, diabetes and known coronary artery disease with prior stenting noncompliant with all of her medications other than a baby aspirin here for chest pain    Patient states over the past 2 to 3 days she has had a left-sided aching chest pain radiating to the left shoulder associated with some shortness of breath. Mild but nonproductive cough. No fevers or chills. No abdominal pain, nausea or vomiting. Denies lower extremity swelling, redness or pain. States she has not seen a cardiologist in years. Has been noncompliant with her medications including Plavix. No obvious alleviating factors    HPI    Nursing Notes were reviewed. REVIEW OF SYSTEMS    (2-9 systems for level 4, 10 or more for level 5)     Review of Systems    Please see HPI for pertinent positive and negative review of system findings. A full 10 system ROS was performed and otherwise negative.         PAST MEDICAL HISTORY     Past Medical History:   Diagnosis Date    Anemia     Cancer ([address] 2016    non small cell lung cancer    Depression     Diabetes mellitus ([address]     Emphysema     Emphysema of lung ([address]     Hyperlipidemia     Hypoglycemia     Psychiatric problem     Seizures ([address]     Type 2 diabetes mellitus with hyperglycemia, without long-term current use of insulin ([address] 3/16/2019         SURGICAL HISTORY       Past Surgical History:   Procedure Laterality Date    BACK SURGERY      HYSTERECTOMY      LUNG BIOPSY Left 11/13/2017         CURRENT MEDICATIONS       Previous Medications    ACCU-CHEK MULTICLIX LANCETS MISC    Check qid    ALBUTEROL SULFATE  (90 BASE) MCG/ACT INHALER    Inhale 2 puffs into the lungs every 6 hours as needed for Wheezing    APIXABAN (ELIQUIS STARTER PACK) 5 MG TABS TABLET    Take 10 mg (2 tablets) orally twice daily for 7 days, then take 5 mg (1 tablet) orally twice daily thereafter.     ASPIRIN 81 MG TABLET    Take 81 mg by mouth daily    ATORVASTATIN (LIPITOR) 40 MG TABLET    Take 40 mg by mouth daily    BENZONATATE (TESSALON) 100 MG CAPSULE    Take 100 mg by mouth 2 times daily as needed for Cough    BLOOD GLUCOSE CALIBRATION (ACCU-CHEK KRISTINA) SOLN    Check when you use a new box of strips    BLOOD GLUCOSE MONITORING SUPPL (ACCU-CHEK KRISTINA PLUS) W/DEVICE KIT    Check qid    BLOOD GLUCOSE TEST STRIPS (ACCU-CHEK KRISTINA PLUS) STRIP    Check qid    CLOPIDOGREL (PLAVIX) 75 MG TABLET    Take 75 mg by mouth daily    FLUTICASONE (FLOVENT HFA) 110 MCG/ACT INHALER    Inhale 1 puff into the lungs 2 times daily    HYDROCHLOROTHIAZIDE (HYDRODIURIL) 12.5 MG TABLET    Take 12.5 mg by mouth daily    HYDROXYZINE (VISTARIL) 25 MG CAPSULE    Take 25 mg by mouth 4 times daily as needed for Itching    INSULIN GLARGINE (LANTUS SOLOSTAR) 100 UNIT/ML INJECTION PEN    Inject 35 Units into the skin nightly    INSULIN LISPRO (HUMALOG KWIKPEN) 100 UNIT/ML PEN    Inject 25 Units into the skin 3 times daily (before meals)    INSULIN PEN NEEDLE (B-D ULTRAFINE III SHORT PEN) 31G X 8 MM MISC    1 each by Does not apply route daily    LANSOPRAZOLE (PREVACID) 15 MG DELAYED RELEASE CAPSULE    Take 15 mg by mouth daily    LEVOTHYROXINE (SYNTHROID) 25 MCG TABLET    Take 100 mcg by mouth Daily     METOPROLOL SUCCINATE (TOPROL XL) 25 MG EXTENDED RELEASE TABLET    Take 25 mg by mouth daily    NITROGLYCERIN (NITROSTAT) 0.4 MG SL TABLET    Place 0.4 mg under the tongue every 5 minutes as needed for Chest pain (Daily as needed) up to max of 3 total doses. If no relief after 1 dose, call 911. OXYCODONE HCL, ABUSE DETER, PO    Take 10 mg by mouth    TIOTROPIUM (SPIRIVA RESPIMAT) 2.5 MCG/ACT AERS INHALER    Inhale 2 puffs into the lungs daily       ALLERGIES     Amoxicillin, Mushroom extract complex, Pcn [penicillins], Coconut oil, Iv dye [iodides], Percocet [oxycodone-acetaminophen], Shellfish-derived products, Tramadol, and Tylenol with codeine [acetaminophen-codeine]    FAMILY HISTORY       Family History   Problem Relation Age of Onset    Stroke Mother     Migraines Mother     Stroke Father     High Cholesterol Father     Depression Sister     Migraines Sister           SOCIAL HISTORY       Social History     Socioeconomic History    Marital status:      Spouse name: None    Number of children: None    Years of education: None    Highest education level: None   Occupational History    None   Tobacco Use    Smoking status: Current Every Day Smoker     Packs/day: 0.20     Years: 37.00     Pack years: 7.40     Types: Cigarettes    Smokeless tobacco: Never Used    Tobacco comment: 10/31/17 0.5ppd   Substance and Sexual Activity    Alcohol use: Not Currently     Comment: rarely    Drug use: No    Sexual activity: None   Other Topics Concern    None   Social History Narrative    None     Social Determinants of Health     Financial Resource Strain:     Difficulty of Paying Living Expenses:    Food Insecurity:     Worried About Running Out of Food in the Last Year:     Ran Out of Food in the Last Year:    Transportation Needs:     Lack of Transportation (Medical):      Lack of Transportation (Non-Medical):    Physical Activity:     Days of Exercise per Week:     Minutes of Exercise per Session:    Stress:     Feeling of Stress :    Social Connections:     Frequency of Communication with Friends and Family:     Frequency of Social Gatherings with Friends and Family:     Attends Sikhism Services:     Active Member of Clubs or Organizations:     Attends Club or Organization Meetings:     Marital Status:    Intimate Partner Violence:     Fear of Current or Ex-Partner:     Emotionally Abused:     Physically Abused:     Sexually Abused:        SCREENINGS               PHYSICAL EXAM    (up to 7 for level 4, 8 or more for level 5)     ED Triage Vitals [10/21/21 2352]   BP Temp Temp Source Pulse Resp SpO2 Height Weight   107/71 98.2 °F (36.8 °C) Oral 79 14 94 % -- 192 lb (87.1 kg)       Physical Exam    General appearance:  Cooperative. No acute distress. Skin:  Warm. Dry. Eye:  Extraocular movements intact. Ears, nose, mouth and throat:  Oral mucosa moist,  Neck:  Trachea midline. Heart:  Regular rate and rhythm  Perfusion:  intact  Respiratory:  Lungs clear to auscultation bilaterally. Respirations nonlabored. Abdominal:   Non distended. Nontender  Neurological:  Alert and oriented x 3.   Moves all extremities spontaneously  Musculoskeletal:   Normal ROM, no deformities          Psychiatric:  Normal mood      DIAGNOSTIC RESULTS       Labs Reviewed   COMPREHENSIVE METABOLIC PANEL W/ REFLEX TO MG FOR LOW K - Abnormal; Notable for the following components:       Result Value    Chloride 98 (*)     Glucose 395 (*)     BUN 6 (*)     All other components within normal limits    Narrative:     Performed at:  [place] [address] [address] [employer]   Phone [phone]   BRAIN NATRIURETIC PEPTIDE - Abnormal; Notable for the following components:    Pro- (*)     All other components within normal limits    Narrative:     Performed at:  [place] [address] [address] [employer]   Phone [phone]   APTT - Abnormal; Notable for the following components:    aPTT 39.4 (*)     All other components within normal limits    Narrative:     Performed at:  [employer] [address] [facility], [facility] [employer]   Phone [phone]   CBC WITH AUTO DIFFERENTIAL    Narrative:     Performed at:  [facility]) [place] [address] [employer]   Phone [phone]   TROPONIN    Narrative:     Performed at:  [facility]) [place] [address] [employer]   Phone [phone]   PROTIME-INR    Narrative:     Performed at:  [facility]) [place] [address] [employer]   Phone [phone]   COMPREHENSIVE METABOLIC PANEL W/ REFLEX TO MG FOR LOW K   CBC WITH AUTO DIFFERENTIAL   POCT GLUCOSE   POCT GLUCOSE   POCT GLUCOSE   POCT GLUCOSE       Interpretation per the Radiologist below, if obtained/available at the time of this note:    XR CHEST PORTABLE   Final Result   Full inflation of the lungs but no pneumonia or CHF. NM LUNG VENT/PERFUSION (VQ)    (Results Pending)       All other labs/imaging were within normal range or not returned as of this dictation. EMERGENCY DEPARTMENT COURSE and DIFFERENTIAL DIAGNOSIS/MDM:   Vitals:    Vitals:    10/21/21 2352 10/22/21 0227   BP: 107/71 108/73   Pulse: 79 73   Resp: 14    Temp: 98.2 °F (36.8 °C)    TempSrc: Oral    SpO2: 94%    Weight: 192 lb (87.1 kg)        EKG: Sinus rhythm rate of 77 bpm.  Left atrial enlargement. Inferior Q waves. Anterior lateral Q waves. No ST elevation. When compared to prior from 5/24/2019 no significant change. Patient presents emergency department today left-sided chest pain. Known coronary artery disease. Noncompliant with medication. Also on review of history there is evidence of questionable PE based on moderate probability VQ scan for which she was prescribed Eliquis but is also noncompliant with at present. Patient reportedly has an IV dye allergy and a VQ scan was ordered today though I have a higher suspicion of possible cardiac etiology.   Troponin negative here.  Given aspirin and nitro and plan to admit to the hospital for further evaluation    MDM    CONSULTS     IP CONSULT TO HOSPITALIST    Critical Care:   None    REASSESSMENT          PROCEDURE     Unless otherwise noted below, none     Procedures      FINAL IMPRESSION      1. Chest pain, unspecified type            DISPOSITION/PLAN   DISPOSITION Admitted 10/22/2021 01:59:19 AM        PATIENT REFERRED TO:  No follow-up provider specified. DISCHARGE MEDICATIONS:  New Prescriptions    No medications on file     Controlled Substances Monitoring:     RX Monitoring 12/2/2015   Periodic Controlled Substance Monitoring No signs of potential drug abuse or diversion identified.        (Please note that portions of this note were completed with a voice recognition program.  Efforts were made to edit the dictations but occasionally words are mis-transcribed.)    Carla Yang MD (electronically signed)  Attending Emergency Physician            Basilio Starkey MD  10/22/21 9163

## 2021-10-22 NOTE — DISCHARGE SUMMARY
Hospital Medicine Discharge Summary    Patient ID: Kya Andrade      Patient's PCP: ED Amezquita - CNP    Admit Date: 10/21/2021     Discharge Date: 10/22/2021      Admitting Physician: Monserrat Aguirre DO     Discharge Physician: Franki Gray MD     Discharge Diagnoses: Active Hospital Problems    Diagnosis     Ischemic cardiomyopathy [I25.5]     Chronic systolic congestive heart failure (HCC) [I50.22]     Abnormal ECG [R94.31]     Coronary artery disease involving native coronary artery of native heart with angina pectoris ([address] [I25.119]     Chest pain [R07.9]        The patient was seen and examined on day of discharge and this discharge summary is in conjunction with any daily progress note from day of discharge. Hospital Course:   Chest pain-atypical-admit, telemetry, serial troponin which are flat, nitro as needed, aspirin statin and continue home medications, exercise stress echo test ordered patient declined ,given the history of CAD and stent cardiology was consulted-  ACS ruled out and okay for discharge and follow-up with her cardiologist     History of?   PE-repeat VQ scan ordered by nocturnist pending  He has been on Eliquis for years and she told it was started by the cardiologist continue  I am not quite sure the positive VQ scan is going to make any difference in the management now  Rpt v/q intermediate probability  Continue Eliquis     Hypokalemia-replace     Chronic psoriasic     Chronic diastolic CHF-no evidence of fluid overload     COPD-no exacerbation continue home inhalers     Smoker- counseled     Diabetes-currently not controlled her home medications were on hold because of n.p.o. status, continue home meds      Noncompliance with meds-counseled     Hypothyroidism Synthroid-          Physical Exam Performed:     /68   Pulse 72   Temp 98.4 °F (36.9 °C) (Oral)   Resp 16   Ht 5' 7\" (1.702 m)   Wt 184 lb (83.5 kg)   SpO2 95%   BMI 28.82 kg/m²       General appearance:  No apparent distress, appears stated age and cooperative. HEENT:  Normal cephalic, atraumatic without obvious deformity. t. Extra ocular muscles intact. Conjunctivae/corneas clear. Neck: Supple, with full range of motion. No jugular venous distention. Trachea midline. Respiratory:  Normal respiratory effort. Clear to auscultation, bilaterally without Rales/Wheezes/Rhonchi. Cardiovascular:  Regular rate and rhythm with normal S1/S2 without murmurs, rubs or gallops. Abdomen: Soft, non-tender, non-distended with normal bowel sounds. Musculoskeletal:  No clubbing, cyanosis or edema bilaterally. Full range of motion without deformity. Skin: Skin color, texture, turgor normal.  No rashes or lesions. Neurologic:  Neurovascularly intact without any focal sensory/motor deficits. .  Psychiatric:  Alert and oriented,   Capillary Refill: Brisk,3 seconds, normal  Peripheral Pulses: +2 palpable, equal bilaterally        Labs: For convenience and continuity at follow-up the following most recent labs are provided:      CBC:    Lab Results   Component Value Date    WBC 7.3 10/22/2021    HGB 14.6 10/22/2021    HCT 43.3 10/22/2021     10/22/2021       Renal:    Lab Results   Component Value Date     10/22/2021    K 3.4 10/22/2021     10/22/2021    CO2 27 10/22/2021    BUN 6 10/22/2021    CREATININE 0.6 10/22/2021    CALCIUM 9.3 10/22/2021    PHOS 2.9 10/26/2017         Significant Diagnostic Studies    Radiology:   NM LUNG VENT/PERFUSION (VQ)   Final Result   Intermediate probability for pulmonary embolism. XR CHEST PORTABLE   Final Result   Full inflation of the lungs but no pneumonia or CHF.                 Consults:     IP CONSULT TO HOSPITALIST  IP CONSULT TO CARDIOLOGY    Disposition:  Home     Condition at Discharge: Stable    Discharge Instructions/Follow-up:  Cardio/ PCP    Code Status:  Prior     Activity: activity as tolerated    Diet: cardiac diet      Discharge Medications: Discharge Medication List as of 10/22/2021  2:11 PM           Details   apixaban (ELIQUIS STARTER PACK) 5 MG TABS tablet Take 10 mg (2 tablets) orally twice daily for 7 days, then take 5 mg (1 tablet) orally twice daily thereafter., Disp-74 tablet, R-0Print      OXYCODONE HCL, ABUSE DETER, PO Take 10 mg by mouthHistorical Med      metoprolol succinate (TOPROL XL) 25 MG extended release tablet Take 25 mg by mouth dailyHistorical Med      insulin glargine (LANTUS SOLOSTAR) 100 UNIT/ML injection pen Inject 35 Units into the skin nightly, Disp-5 pen, R-2Normal      insulin lispro (HUMALOG KWIKPEN) 100 UNIT/ML pen Inject 25 Units into the skin 3 times daily (before meals), Disp-7 pen, R-3Normal      Insulin Pen Needle (B-D ULTRAFINE III SHORT PEN) 31G X 8 MM MISC DAILY Starting Mon 3/18/2019, Disp-100 each, R-3, Normal      blood glucose test strips (ACCU-CHEK KRISTINA PLUS) strip Disp-150 each, R-3, NormalCheck qid      Blood Glucose Monitoring Suppl (ACCU-CHEK KRISTINA PLUS) w/Device KIT Disp-1 kit, R-0, NormalCheck qid      Blood Glucose Calibration (ACCU-CHEK KRISTINA) SOLN Disp-1 each, R-0, NormalCheck when you use a new box of strips      ACCU-CHEK MULTICLIX LANCETS MISC Disp-200 each, R-2, NormalCheck qid      lansoprazole (PREVACID) 15 MG delayed release capsule Take 15 mg by mouth dailyHistorical Med      hydrOXYzine (VISTARIL) 25 MG capsule Take 25 mg by mouth 4 times daily as needed for ItchingHistorical Med      aspirin 81 MG tablet Take 81 mg by mouth dailyHistorical Med      clopidogrel (PLAVIX) 75 MG tablet Take 75 mg by mouth dailyHistorical Med      nitroGLYCERIN (NITROSTAT) 0.4 MG SL tablet Place 0.4 mg under the tongue every 5 minutes as needed for Chest pain (Daily as needed) up to max of 3 total doses. If no relief after 1 dose, call 911. Historical Med      atorvastatin (LIPITOR) 40 MG tablet Take 40 mg by mouth dailyHistorical Med      hydrochlorothiazide (HYDRODIURIL) 12.5 MG tablet Take 12.5 mg by mouth dailyHistorical Med      levothyroxine (SYNTHROID) 25 MCG tablet Take 100 mcg by mouth Daily Historical Med      benzonatate (TESSALON) 100 MG capsule Take 100 mg by mouth 2 times daily as needed for CoughHistorical Med      fluticasone (FLOVENT HFA) 110 MCG/ACT inhaler Inhale 1 puff into the lungs 2 times dailyHistorical Med      albuterol sulfate  (90 Base) MCG/ACT inhaler Inhale 2 puffs into the lungs every 6 hours as needed for WheezingHistorical Med      tiotropium (SPIRIVA RESPIMAT) 2.5 MCG/ACT AERS inhaler Inhale 2 puffs into the lungs daily, Disp-1 Inhaler, R-1Normal             Time Spent on discharge is   30 minutes in the examination, evaluation, counseling and review of medications and discharge plan. Signed:    Damion Bergman MD   10/24/2021      Thank you Corene Landau, APRN - CNP for the opportunity to be involved in this patient's care. If you have any questions or concerns please feel free to contact me at [phone].

## 2021-10-22 NOTE — PROGRESS NOTES
Patient admitted to room 370 from ED. Patient oriented to room, call light, bed rails, phone, lights and bathroom. Patient instructed about the schedule of the day including: vital sign frequency, lab draws, possible tests, frequency of MD and staff rounds, including RN/MD rounding together at bedside, daily weights, and I &O's. Patient instructed about prescribed diet, how to use 8MENU, and television. Bed alarm in place, patient aware of placement and reason. Telemetry box 60 in place, patient aware of placement and reason. Bed locked, in lowest position, side rails up 2/4, call light within reach. Will continue to monitor.

## 2021-10-22 NOTE — PROGRESS NOTES
4 Eyes Skin Assessment     The patient is being assess for   Admission    I agree that 2 RN's have performed a thorough Head to Toe Skin Assessment on the patient. ALL assessment sites listed below have been assessed. Areas assessed for pressure by both nurses:   [x]   Head, Face, and Ears   [x]   Shoulders, Back, and Chest, Abdomen  [x]   Arms, Elbows, and Hands   [x]   Coccyx, Sacrum, and Ischium  [x]   Legs, Feet, and Heels        Skin Assessed Under all Medical Devices by both nurses:  none              All Mepilex Borders were peeled back and area peeked at by both nurses:  Yes  Please list where Mepilex Borders are located:  none             **SHARE this note so that the co-signing nurse is able to place an eSignature**    Co-signer eSignature: {Esignature:[id number]}    Does the Patient have Skin Breakdown related to pressure?   No              Alonso Prevention initiated:  NA   Wound Care Orders initiated:  NA      WOC nurse consulted for Pressure Injury (Stage 3,4, Unstageable, DTI, NWPT, Complex wounds)and New or Established Ostomies:  NA      Primary Nurse eSignature: Electronically signed by Luis Sheehan RN on 10/22/21 at 7:20 AM EDT

## 2021-10-22 NOTE — H&P
Hospital Medicine History & Physical      PCP: Jorgito Powell APRN - CNP    Date of Admission: 10/21/2021    Date of Service: Pt seen/examined on 10/22/2021 and Admitted to Inpatient with expected LOS greater than two midnights due to medical therapy. Chief Complaint: Chest pain      History Of Present Illness:  (    52 y.o. female who presented to [facility] with above complaint. She told she has been having chest pain for couple of months but what she experienced yesterday was more intensed. She does not have any chest pain now no shortness of breath, cough, nausea vomiting diarrhea or urinary complaints. No change in mental status. The pain she experienced was sharp pain not associated with nausea vomiting shortness of breath or dizziness. Lasted for hour  She denies any recent travel history, calf pain or lower extremity swelling. Past Medical History:          Diagnosis Date    Anemia     Cancer ([address] 2016    non small cell lung cancer    Depression     Diabetes mellitus ([facility]     Emphysema     Emphysema of lung ([facility]     Hyperlipidemia     Hypoglycemia     Psychiatric problem     Seizures ([facility]     Type 2 diabetes mellitus with hyperglycemia, without long-term current use of insulin ([facility] 3/16/2019       Past Surgical History:          Procedure Laterality Date    BACK SURGERY      HYSTERECTOMY      LUNG BIOPSY Left 11/13/2017       Medications Prior to Admission:      Prior to Admission medications    Medication Sig Start Date End Date Taking? Authorizing Provider   apixaban (ELIQUIS STARTER PACK) 5 MG TABS tablet Take 10 mg (2 tablets) orally twice daily for 7 days, then take 5 mg (1 tablet) orally twice daily thereafter.  5/25/19   Tere Godfrey MD   OXYCODONE HCL, ABUSE DETER, PO Take 10 mg by mouth    Historical Provider, MD   metoprolol succinate (TOPROL XL) 25 MG extended release tablet Take 25 mg by mouth daily    Historical Provider, MD   insulin glargine (LANTUS SOLOSTAR) 100 UNIT/ML injection pen Inject 35 Units into the skin nightly 3/18/19   Kerry Sims MD   insulin lispro (HUMALOG KWIKPEN) 100 UNIT/ML pen Inject 25 Units into the skin 3 times daily (before meals) 3/18/19   Kerry Sims MD   Insulin Pen Needle (B-D ULTRAFINE III SHORT PEN) 31G X 8 MM MISC 1 each by Does not apply route daily 3/18/19   Kerry Sims MD   blood glucose test strips (ACCU-CHEK KRISTINA PLUS) strip Check qid 3/18/19   Kerry Sims MD   Blood Glucose Monitoring Suppl (ACCU-CHEK KRISTINA PLUS) w/Device KIT Check qid 3/18/19   Kerry Sims MD   Blood Glucose Calibration (ACCU-CHEK KRISTINA) SOLN Check when you use a new box of strips 3/18/19   Kerry Sims MD   ACCU-CHEK MULTICLIX LANCETS MISC Check qid 3/18/19   Kerry Sims MD   lansoprazole (PREVACID) 15 MG delayed release capsule Take 15 mg by mouth daily    Historical Provider, MD   hydrOXYzine (VISTARIL) 25 MG capsule Take 25 mg by mouth 4 times daily as needed for Itching    Historical Provider, MD   aspirin 81 MG tablet Take 81 mg by mouth daily    Historical Provider, MD   clopidogrel (PLAVIX) 75 MG tablet Take 75 mg by mouth daily    Historical Provider, MD   nitroGLYCERIN (NITROSTAT) 0.4 MG SL tablet Place 0.4 mg under the tongue every 5 minutes as needed for Chest pain (Daily as needed) up to max of 3 total doses. If no relief after 1 dose, call 911.     Historical Provider, MD   atorvastatin (LIPITOR) 40 MG tablet Take 40 mg by mouth daily    Historical Provider, MD   hydrochlorothiazide (HYDRODIURIL) 12.5 MG tablet Take 12.5 mg by mouth daily    Historical Provider, MD   levothyroxine (SYNTHROID) 25 MCG tablet Take 100 mcg by mouth Daily     Historical Provider, MD   benzonatate (TESSALON) 100 MG capsule Take 100 mg by mouth 2 times daily as needed for Cough    Historical Provider, MD   fluticasone (FLOVENT HFA) 110 MCG/ACT inhaler Inhale 1 puff into the lungs 2 times daily    Historical Provider, MD   albuterol sulfate  (90 Base) MCG/ACT inhaler Inhale 2 puffs into the lungs every 6 hours as needed for Wheezing    Historical Provider, MD   tiotropium (SPIRIVA RESPIMAT) 2.5 MCG/ACT AERS inhaler Inhale 2 puffs into the lungs daily 10/31/17 11/30/17  April Grace Pryor MD       Allergies:  Amoxicillin, Mushroom extract complex, Pcn [penicillins], Coconut oil, Iv dye [iodides], Percocet [oxycodone-acetaminophen], Shellfish-derived products, Tramadol, and Tylenol with codeine [acetaminophen-codeine]    Social History:      The patient currently lives at home    TOBACCO:   reports that she has been smoking cigarettes. She has a 7.40 pack-year smoking history. She has never used smokeless tobacco.  ETOH:   reports previous alcohol use. E-Cigarettes/Vaping Use     Questions Responses    E-Cigarette/Vaping Use     Start Date     Passive Exposure     Quit Date     Counseling Given     Comments             Family History:       Reviewed in detail and negative for DM, CAD, Cancer, CVA. Positive as follows:        Problem Relation Age of Onset    Stroke Mother     Migraines Mother     Stroke Father     High Cholesterol Father     Depression Sister     Migraines Sister        REVIEW OF SYSTEMS COMPLETED:   Pertinent positives as noted in the HPI. All other systems reviewed and negative. PHYSICAL EXAM PERFORMED:    BP 93/65   Pulse 68   Temp 98.3 °F (36.8 °C) (Oral)   Resp 24   Ht 5' 7\" (1.702 m)   Wt 184 lb (83.5 kg)   SpO2 93%   BMI 28.82 kg/m²     General appearance:  No apparent distress, appears stated age and cooperative. HEENT:  Normal cephalic, atraumatic without obvious deformity. t. Extra ocular muscles intact. Conjunctivae/corneas clear. Neck: Supple, with full range of motion. No jugular venous distention. Trachea midline. Respiratory:  Normal respiratory effort. Clear to auscultation, bilaterally without Rales/Wheezes/Rhonchi.   Cardiovascular:  Regular rate and rhythm with normal S1/S2 without murmurs, rubs or gallops. Abdomen: Soft, non-tender, non-distended with normal bowel sounds. Musculoskeletal:  No clubbing, cyanosis or edema bilaterally. Full range of motion without deformity. Skin: Skin color, texture, turgor normal.  No rashes or lesions. Neurologic:  Neurovascularly intact without any focal sensory/motor deficits. .  Psychiatric:  Alert and oriented,   Capillary Refill: Brisk,3 seconds, normal  Peripheral Pulses: +2 palpable, equal bilaterally       Labs:     Recent Labs     10/22/21  0054 10/22/21  0636   WBC 9.1 7.3   HGB 15.0 14.6   HCT 44.8 43.3    184     Recent Labs     10/22/21  0054 10/22/21  0636    140   K 3.8 3.4*   CL 98* 102   CO2 28 27   BUN 6* 6*   CREATININE 0.6 0.6   CALCIUM 9.7 9.3     Recent Labs     10/22/21  0054 10/22/21  0636   AST 26 23   ALT 31 28   BILITOT 0.5 0.6   ALKPHOS 110 99     Recent Labs     10/22/21  0054   INR 1.00     Recent Labs     10/22/21  0054   TROPONINI <0.01       Urinalysis:      Lab Results   Component Value Date    NITRU Negative 09/02/2019    WBCUA 10-20 09/02/2019    BACTERIA 2+ 09/20/2016    RBCUA 20-50 09/02/2019    BLOODU LARGE 09/02/2019    SPECGRAV >=1.030 09/02/2019    GLUCOSEU Negative 09/02/2019       Radiology:     CXR: I have reviewed the CXR with the following interpretation:   EKG:  I have reviewed the EKG with the following interpretation:   Normal sinus rhythmInferior infarct , age undeterminedAnterolateral infarct (cited on or before 16-MAR-2019)Abnormal ECGConfirmed by Adela Priest MD, ZORAIDA     XR CHEST PORTABLE   Final Result   Full inflation of the lungs but no pneumonia or CHF.          NM LUNG VENT/PERFUSION (VQ)    (Results Pending)       ASSESSMENT:    Active Hospital Problems    Diagnosis Date Noted    Chest pain [R07.9] 03/16/2019         PLAN:    Chest pain-atypical-admit, telemetry, serial troponin which are flat, nitro as needed, aspirin statin and continue home medications, exercise stress echo test ordered patient declined ,given the history of CAD and stent cardiology was consulted-  ACS ruled out and okay for discharge and follow-up with her cardiologist    History of? PE-repeat VQ scan ordered by nocturnist pending  He has been on Eliquis for years and she told it was started by the cardiologist continue  I am not quite sure the positive VQ scan is going to make any difference in the management now    Hypokalemia-replace    Chronic psoriasic    Chronic diastolic CHF-no evidence of fluid overload    COPD-no exacerbation continue home inhalers    Smoker- counseled    Diabetes-currently not controlled her home medications were on hold because of n.p.o. status, continue home meds     Noncompliance with meds-counseled    Hypothyroidism Synthroid-      DVT Prophylaxis: On Eliquis  Diet: Diet NPO Exceptions are: Ice Chips, Sips of Water with Meds  Diet NPO Exceptions are: [employer], Sips of Water with Meds  Code Status: Full Code    PT/OT Eval Status:     Dispo -today       Franki Gray MD    Thank you ED Amezquita - CHERYL for the opportunity to be involved in this patient's care. If you have any questions or concerns please feel free to contact me at [phone].

## 2021-12-13 ENCOUNTER — HOSPITAL ENCOUNTER (EMERGENCY)
Age: 51
Discharge: LEFT BEFORE DISCHARGE | End: 2021-12-13

## 2021-12-13 NOTE — ED NOTES
Called no answer     Essence Kelly RN  12/13/21 5948 Confused, wanting to get out of bed, with complaints of penis pain 2/10, medicated as scheduled.

## 2021-12-16 ENCOUNTER — HOSPITAL ENCOUNTER (OUTPATIENT)
Dept: CT IMAGING | Age: 51
Discharge: HOME | End: 2021-12-16
Payer: MEDICARE

## 2021-12-16 DIAGNOSIS — R59.0 CERVICAL LYMPHADENOPATHY: ICD-10-CM

## 2021-12-16 DIAGNOSIS — C34.12 SQUAMOUS CELL CARCINOMA OF BRONCHUS IN LEFT UPPER LOBE (HCC): ICD-10-CM

## 2021-12-16 PROCEDURE — 74176 CT ABD & PELVIS W/O CONTRAST: CPT

## 2022-03-17 ENCOUNTER — HOSPITAL ENCOUNTER (OUTPATIENT)
Dept: CT IMAGING | Age: 52
Discharge: HOME | End: 2022-03-17
Payer: MEDICARE

## 2022-03-17 DIAGNOSIS — C34.12 SQUAMOUS CELL CARCINOMA OF BRONCHUS IN LEFT UPPER LOBE (HCC): ICD-10-CM

## 2022-03-17 PROCEDURE — 74176 CT ABD & PELVIS W/O CONTRAST: CPT

## 2022-03-23 ENCOUNTER — TELEPHONE (OUTPATIENT)
Dept: FAMILY MEDICINE CLINIC | Age: 52
End: 2022-03-23

## 2022-03-23 NOTE — TELEPHONE ENCOUNTER
Referral to Dr Carmen Keen rec'd 3/23/22 - From [facility]    Scanned into Media and routed to Dr Carmen Keen. In addition hard copy placed on Dr Latrice Spencer desk. Document attached to phone note.

## 2022-04-06 ENCOUNTER — TELEMEDICINE (OUTPATIENT)
Dept: PSYCHOLOGY | Age: 52
End: 2022-04-06
Payer: MEDICARE

## 2022-04-06 DIAGNOSIS — F32.A DEPRESSION, UNSPECIFIED DEPRESSION TYPE: Primary | ICD-10-CM

## 2022-04-06 DIAGNOSIS — F41.9 ANXIETY: ICD-10-CM

## 2022-04-06 PROCEDURE — 90791 PSYCH DIAGNOSTIC EVALUATION: CPT | Performed by: PSYCHOLOGIST

## 2022-04-06 NOTE — PROGRESS NOTES
Behavioral Health Consultation  Mariia Meade Psy.D. Psychologist  4/6/2022  9-9:30 AM EDT      Time spent with Patient: 30 minutes  This is patient's first [facility] appointment. Reason for Consult: Depression, anxiety, grief  Referring Provider: Eugenia Doshi MD, from [facility]   No primary care provider on file. No primary provider on file. TELEHEALTH VISIT -- Audio/Visual (During WURWE-28 public health emergency)    Pt provided informed consent for the behavioral health program and participation in telehealth services. Discussed with patient the model of service, including the limits of confidentiality (e.g., abuse reporting, suicide intervention) and the nature of the [facility] approach (e.g., focused, targeted interventions; open communication with PCP). Pt indicated understanding. Feedback given to PCP.  }  Liz Hurtado was evaluated through a synchronous (real-time) audio-video encounter using HIPAA-compliant technology. The patient (or guardian, if applicable) is aware that this is a billable service, which includes applicable co-pays. This Virtual Visit was conducted with patient's (and/or legal guardian's) consent. The visit was conducted pursuant to the emergency declaration under the [address] [place] [address] [facility] authority and the [email or web address] and [place] General Act. Patient identification was verified, and a caregiver was present when appropriate. The patient was located in a state where the provider was licensed to provide care. Conducted a risk-benefit analysis and determined that the patient's presenting problems are consistent with the use of telepsychology. Determined that the patient has sufficient knowledge and skills in the use of technology enabling them to adequately benefit from telepsychology. It was determined that this patient was able to be properly treated without an in-person session.  Patient verified current location at the beginning of the visit. Verified the following information:  Patient's identification: Yes  Patient location: Daughter's address @ [address] [address] [address]  Patient's call back number: [phone]  Patient's emergency contact's name and number, as well as permission to contact them if needed: Lucinda Sy [phone]  Extended Emergency Contact Information  Primary Emergency Contact: [address], [address] Phone: [phone]  Relation: Child    Provider location: J[place] Party:    [address] [facility] lives with ex-, ex-brother-in-law, ex-'s new wife, and her son. Pt will be buying the trailer from them and they will be moving out soon. Pt spends a lot of time at her daughter's apartment, helping with her grandkids.    -Family / Kelsey has 3 adult children. Tragically ost her oldest son Issa Rivera in January '22 to a heart attack. Son Issa Rivera was pt's biggest support. Daughter is fighting for custody of 3 of her kids, while also caring for her 2 other kids. Other son is busy with his own life. Pt has 17 grandchildren.    -Sabianist / [place] [address] [address] [facility]. Believes in the will of prayer. Health Behaviors     -Pertinent Medical History - History of cancer, first dx 8.5 years. Had a lot of support during treatment. Scans were clear for last 3 years. Now has nodules in lung; cancer may have returned. Pt is very weak, can't walk far d/t SOB from COPD as well as a pacemaker. Doesn't want to let go of her dogs but others do not think she will be able to care for them on her own. -Risk Assessment - Passive thoughts of death related to being with her late son. No SI, plan or intent. -[address] talking to a therapist years ago; wasn't helpful to just be asked how she's feeling.     -Trauma History - Loss of her son in January '22.          Social History     Tobacco Use    Smoking status: Current Every Day Smoker Packs/day: 0.20     Years: 37.00     Pack years: 7.40     Types: Cigarettes    Smokeless tobacco: Never Used    Tobacco comment: 10/31/17 0.5ppd   Substance Use Topics    Alcohol use: Not Currently     Comment: rarely      Illicit drugs:   Social History     Substance and Sexual Activity   Drug Use No        O:  Interventions:  -Contextual assessment  -Supportive techniques  -Conducted risk assessment. Appropriate for outpatient / telehealth care at this time. A:  MSE:  Appearance: good hygiene  and appropriate attire  Attitude: cooperative and friendly  Consciousness: alert  Orientation: oriented to person, place, time, general circumstance  Memory: recent and remote memory intact  Attention/Concentration: intact during session  Psychomotor Activity: normal  Eye Contact: normal  Speech: normal rate and volume, well-articulated  Mood: dysphoric, anxious  Affect: congruent  Perception: within normal limits  Thought Content: within normal limits  Thought Process: logical, coherent and goal-directed  Insight: good  Judgment: intact  Ability to understand instructions: Yes  Ability to respond meaningfully: Yes  Morbid Ideation: no   Suicide Assessment: no suicidal ideation, plan, or intent. Appropriate for outpatient / telehealth care at this time. Homicidal Ideation: no      PHQ Scores 8/21/2017   PHQ2 Score 6   PHQ9 Score 21     Interpretation of Total Score Depression Severity: 1-4 = Minimal depression, 5-9 = Mild depression, 10-14 = Moderate depression, 15-19 = Moderately severe depression, 20-27 = Severe depression    Diagnosis:    1. Depression, unspecified depression type    2.  Anxiety        Patient Active Problem List   Diagnosis    Mood swings    Cervical lymphadenopathy    Tobacco abuse    Lung mass    Mediastinal adenopathy    Iatrogenic pneumothorax    Atelectasis    Panlobular emphysema (HCC)    Chest pain    Type 2 diabetes mellitus with hyperglycemia, without long-term current use of insulin (HCC)    Dyspnea and respiratory abnormalities    Hyperglycemia    Coronary artery disease involving native coronary artery of native heart with angina pectoris (HCC)    Small cell carcinoma (HCC)    Ischemic cardiomyopathy    Chronic systolic congestive heart failure (HCC)    Abnormal ECG         Plan:  Pt interventions:  Established rapport, [place]-setting to identify pt's primary goals for [facility] visit / overall health, Supportive techniques, Emphasized self-care as important for managing overall health and Completed risk evaluation. Pt Behavioral Change Plan:  Pt set the following goals:  1. Return for a F/U virtual visit.

## 2022-05-05 ENCOUNTER — TELEMEDICINE (OUTPATIENT)
Dept: PSYCHOLOGY | Age: 52
End: 2022-05-05
Payer: MEDICARE

## 2022-05-05 DIAGNOSIS — F32.A DEPRESSION, UNSPECIFIED DEPRESSION TYPE: Primary | ICD-10-CM

## 2022-05-05 DIAGNOSIS — F41.9 ANXIETY: ICD-10-CM

## 2022-05-05 PROCEDURE — 90832 PSYTX W PT 30 MINUTES: CPT | Performed by: PSYCHOLOGIST
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
## 2022-05-13 ENCOUNTER — HOSPITAL ENCOUNTER (OUTPATIENT)
Dept: PET IMAGING | Age: 52
Discharge: HOME | End: 2022-05-13
Payer: MEDICARE

## 2022-05-13 DIAGNOSIS — R91.1 LUNG NODULE: ICD-10-CM

## 2022-05-13 PROCEDURE — 78815 PET IMAGE W/CT SKULL-THIGH: CPT

## 2022-05-13 PROCEDURE — A9552 F18 FDG: HCPCS | Performed by: NURSE PRACTITIONER

## 2022-05-13 PROCEDURE — 3430000000 HC RX DIAGNOSTIC RADIOPHARMACEUTICAL: Performed by: NURSE PRACTITIONER

## 2022-05-13 RX ORDER — FLUDEOXYGLUCOSE F 18 200 MCI/ML
15.44 INJECTION, SOLUTION INTRAVENOUS
Status: COMPLETED | OUTPATIENT
Start: 2022-05-13 | End: 2022-05-13

## 2022-05-13 RX ADMIN — FLUDEOXYGLUCOSE F 18 15.44 MILLICURIE: 200 INJECTION, SOLUTION INTRAVENOUS at 09:59

## 2022-06-13 ENCOUNTER — CLINICAL DOCUMENTATION (OUTPATIENT)
Dept: PSYCHOLOGY | Age: 52
End: 2022-06-13

## 2022-08-12 ENCOUNTER — HOSPITAL ENCOUNTER (OUTPATIENT)
Dept: CT IMAGING | Age: 52
Discharge: HOME | End: 2022-08-12
Payer: MEDICARE

## 2022-08-12 DIAGNOSIS — C34.12 SQUAMOUS CELL CARCINOMA OF BRONCHUS IN LEFT UPPER LOBE (HCC): ICD-10-CM

## 2022-08-12 PROCEDURE — 74176 CT ABD & PELVIS W/O CONTRAST: CPT

## 2022-10-24 ENCOUNTER — HOSPITAL ENCOUNTER (INPATIENT)
Age: 52
LOS: 1 days | Discharge: HOME | DRG: 153 | End: 2022-10-25
Attending: EMERGENCY MEDICINE | Admitting: INTERNAL MEDICINE
Payer: MEDICARE

## 2022-10-24 ENCOUNTER — APPOINTMENT (OUTPATIENT)
Dept: CT IMAGING | Age: 52
DRG: 153 | End: 2022-10-24
Payer: MEDICARE

## 2022-10-24 DIAGNOSIS — J03.90 ACUTE TONSILLITIS, UNSPECIFIED ETIOLOGY: ICD-10-CM

## 2022-10-24 DIAGNOSIS — J35.8 TONSILLAR MASS: Primary | ICD-10-CM

## 2022-10-24 PROBLEM — R22.1 THROAT SWELLING: Status: ACTIVE | Noted: 2022-10-24

## 2022-10-24 LAB
A/G RATIO: 1 (ref 1.1–2.2)
ALBUMIN SERPL-MCNC: 4 G/DL (ref 3.4–5)
ALP BLD-CCNC: 125 U/L (ref 40–129)
ALT SERPL-CCNC: 19 U/L (ref 10–40)
ANION GAP SERPL CALCULATED.3IONS-SCNC: 18 MMOL/L (ref 3–16)
AST SERPL-CCNC: 17 U/L (ref 15–37)
BASOPHILS ABSOLUTE: 0.1 K/UL (ref 0–0.2)
BASOPHILS RELATIVE PERCENT: 1 %
BILIRUB SERPL-MCNC: 0.7 MG/DL (ref 0–1)
BUN BLDV-MCNC: 11 MG/DL (ref 7–20)
CALCIUM SERPL-MCNC: 10.3 MG/DL (ref 8.3–10.6)
CHLORIDE BLD-SCNC: 92 MMOL/L (ref 99–110)
CO2: 22 MMOL/L (ref 21–32)
CREAT SERPL-MCNC: <0.5 MG/DL (ref 0.6–1.1)
EOSINOPHILS ABSOLUTE: 0.1 K/UL (ref 0–0.6)
EOSINOPHILS RELATIVE PERCENT: 1.2 %
GFR SERPL CREATININE-BSD FRML MDRD: >60 ML/MIN/{1.73_M2}
GLUCOSE BLD-MCNC: 466 MG/DL (ref 70–99)
HCT VFR BLD CALC: 46.5 % (ref 36–48)
HEMOGLOBIN: 15.6 G/DL (ref 12–16)
LYMPHOCYTES ABSOLUTE: 1.4 K/UL (ref 1–5.1)
LYMPHOCYTES RELATIVE PERCENT: 14.5 %
MCH RBC QN AUTO: 30.2 PG (ref 26–34)
MCHC RBC AUTO-ENTMCNC: 33.4 G/DL (ref 31–36)
MCV RBC AUTO: 90.3 FL (ref 80–100)
MONOCYTES ABSOLUTE: 0.8 K/UL (ref 0–1.3)
MONOCYTES RELATIVE PERCENT: 8.5 %
NEUTROPHILS ABSOLUTE: 7.3 K/UL (ref 1.7–7.7)
NEUTROPHILS RELATIVE PERCENT: 74.8 %
PDW BLD-RTO: 13.9 % (ref 12.4–15.4)
PLATELET # BLD: 184 K/UL (ref 135–450)
PMV BLD AUTO: 9.2 FL (ref 5–10.5)
POTASSIUM REFLEX MAGNESIUM: 4.7 MMOL/L (ref 3.5–5.1)
PROCALCITONIN: 0.23 NG/ML (ref 0–0.15)
RBC # BLD: 5.16 M/UL (ref 4–5.2)
REASON FOR REJECTION: NORMAL
REJECTED TEST: NORMAL
S PYO AG THROAT QL: NEGATIVE
SARS-COV-2, NAAT: NOT DETECTED
SODIUM BLD-SCNC: 132 MMOL/L (ref 136–145)
TOTAL PROTEIN: 8.1 G/DL (ref 6.4–8.2)
WBC # BLD: 9.7 K/UL (ref 4–11)

## 2022-10-24 PROCEDURE — 85025 COMPLETE CBC W/AUTO DIFF WBC: CPT

## 2022-10-24 PROCEDURE — 84145 PROCALCITONIN (PCT): CPT

## 2022-10-24 PROCEDURE — 96375 TX/PRO/DX INJ NEW DRUG ADDON: CPT

## 2022-10-24 PROCEDURE — 96365 THER/PROPH/DIAG IV INF INIT: CPT

## 2022-10-24 PROCEDURE — 87880 STREP A ASSAY W/OPTIC: CPT

## 2022-10-24 PROCEDURE — 70490 CT SOFT TISSUE NECK W/O DYE: CPT

## 2022-10-24 PROCEDURE — 87635 SARS-COV-2 COVID-19 AMP PRB: CPT

## 2022-10-24 PROCEDURE — 6360000002 HC RX W HCPCS: Performed by: INTERNAL MEDICINE

## 2022-10-24 PROCEDURE — 87040 BLOOD CULTURE FOR BACTERIA: CPT

## 2022-10-24 PROCEDURE — 87081 CULTURE SCREEN ONLY: CPT

## 2022-10-24 PROCEDURE — 6360000002 HC RX W HCPCS: Performed by: EMERGENCY MEDICINE

## 2022-10-24 PROCEDURE — 80053 COMPREHEN METABOLIC PANEL: CPT

## 2022-10-24 PROCEDURE — 1200000000 HC SEMI PRIVATE

## 2022-10-24 PROCEDURE — 2500000003 HC RX 250 WO HCPCS: Performed by: EMERGENCY MEDICINE

## 2022-10-24 PROCEDURE — 99285 EMERGENCY DEPT VISIT HI MDM: CPT

## 2022-10-24 RX ORDER — ACETAMINOPHEN 325 MG/1
650 TABLET ORAL EVERY 6 HOURS PRN
Status: DISCONTINUED | OUTPATIENT
Start: 2022-10-24 | End: 2022-10-25 | Stop reason: HOSPADM

## 2022-10-24 RX ORDER — MORPHINE SULFATE 4 MG/ML
4 INJECTION, SOLUTION INTRAMUSCULAR; INTRAVENOUS
Status: DISCONTINUED | OUTPATIENT
Start: 2022-10-24 | End: 2022-10-25 | Stop reason: HOSPADM

## 2022-10-24 RX ORDER — MORPHINE SULFATE 2 MG/ML
2 INJECTION, SOLUTION INTRAMUSCULAR; INTRAVENOUS
Status: DISCONTINUED | OUTPATIENT
Start: 2022-10-24 | End: 2022-10-25 | Stop reason: HOSPADM

## 2022-10-24 RX ORDER — ACETAMINOPHEN 650 MG/1
650 SUPPOSITORY RECTAL EVERY 6 HOURS PRN
Status: DISCONTINUED | OUTPATIENT
Start: 2022-10-24 | End: 2022-10-25 | Stop reason: HOSPADM

## 2022-10-24 RX ORDER — DEXAMETHASONE SODIUM PHOSPHATE 10 MG/ML
10 INJECTION, SOLUTION INTRAMUSCULAR; INTRAVENOUS ONCE
Status: COMPLETED | OUTPATIENT
Start: 2022-10-24 | End: 2022-10-24

## 2022-10-24 RX ORDER — CLINDAMYCIN PHOSPHATE 600 MG/50ML
600 INJECTION INTRAVENOUS ONCE
Status: COMPLETED | OUTPATIENT
Start: 2022-10-24 | End: 2022-10-24

## 2022-10-24 RX ORDER — KETOROLAC TROMETHAMINE 30 MG/ML
15 INJECTION, SOLUTION INTRAMUSCULAR; INTRAVENOUS ONCE
Status: COMPLETED | OUTPATIENT
Start: 2022-10-24 | End: 2022-10-24

## 2022-10-24 RX ADMIN — DEXAMETHASONE SODIUM PHOSPHATE 10 MG: 10 INJECTION, SOLUTION INTRAMUSCULAR; INTRAVENOUS at 16:49

## 2022-10-24 RX ADMIN — CLINDAMYCIN IN 5 PERCENT DEXTROSE 600 MG: 12 INJECTION, SOLUTION INTRAVENOUS at 18:54

## 2022-10-24 RX ADMIN — MORPHINE SULFATE 2 MG: 2 INJECTION, SOLUTION INTRAMUSCULAR; INTRAVENOUS at 21:30

## 2022-10-24 RX ADMIN — KETOROLAC TROMETHAMINE 15 MG: 30 INJECTION, SOLUTION INTRAMUSCULAR at 18:50

## 2022-10-24 ASSESSMENT — ENCOUNTER SYMPTOMS
COUGH: 0
PHOTOPHOBIA: 0
ABDOMINAL DISTENTION: 0
SHORTNESS OF BREATH: 0
RHINORRHEA: 0
WHEEZING: 0
NAUSEA: 0
BACK PAIN: 0
VOMITING: 0
TROUBLE SWALLOWING: 1
DIARRHEA: 0
VOICE CHANGE: 1
SORE THROAT: 1

## 2022-10-24 ASSESSMENT — PAIN SCALES - GENERAL
PAINLEVEL_OUTOF10: 5
PAINLEVEL_OUTOF10: 6

## 2022-10-24 ASSESSMENT — PAIN DESCRIPTION - DESCRIPTORS: DESCRIPTORS: ACHING

## 2022-10-24 ASSESSMENT — PAIN DESCRIPTION - LOCATION
LOCATION: THROAT
LOCATION: HEAD

## 2022-10-24 ASSESSMENT — PAIN - FUNCTIONAL ASSESSMENT: PAIN_FUNCTIONAL_ASSESSMENT: 0-10

## 2022-10-24 NOTE — ED NOTES
Blood Culture number set 1  drawn from right AC at 648pm. Site cleaned with chlorhexidine swab for 30 seconds and allowed to dry for 30 seconds. Noted site was fully dry before cultures drawn. Blood Culture number set 2 drawn from right hand at 652pm. Site cleaned with chlorhexidine swab for 30 seconds and allowed to dry for 30 seconds. Noted site was fully dry before cultures drawn.       Rosy Hayden RN  10/24/22 [id number]

## 2022-10-24 NOTE — ED PROVIDER NOTES
Emergency Department Provider Note  Location: [place] [address] ED  10/24/2022     Patient Identification  Cloeman Youssef is a 49 y.o. female    Chief Complaint  Oral Swelling (Pt states some swelling in her throat x2 days. Pt states that she has had some difficulty swallowing. Pt states that when she tried to swallow water today that it came out of her nose. Pt states that is why she decided to come to ED. 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 She endorses subjective fevers and chills over the same time period. Constant achy pain and now having difficulty swallowing. She brother is here who reports progressive muffled voice as well. No new numbness or weakness or headache. No known sick contacts. She does endorse some nausea and 1 episode of nonbloody nonbilious emesis. She is concerned today when the emesis came out of her nose. I have reviewed the following nursing documentation:  Allergies: Allergies   Allergen Reactions    Amoxicillin Anaphylaxis    Mushroom Extract Complex Anaphylaxis    Pcn [Penicillins] Other (See Comments)     Pt states that she was in a coma for 5 days    Coconut Oil     Iv Dye [Iodides]     Percocet [Oxycodone-Acetaminophen]      Tolerates Vicodin. Shellfish-Derived Products     Tramadol     Tylenol With Codeine [Acetaminophen-Codeine]      Pt states she can take codeine, and take tylenol-just cant take the combination  Tolerates Vicodin.        Past medical history:  has a past medical history of Anemia, Cancer ([address] (2016), Depression, Diabetes mellitus ([address] Emphysema, Emphysema of lung ([address] Hyperlipidemia, Hypoglycemia, Psychiatric problem, Seizures ([address] and Type 2 diabetes mellitus with hyperglycemia, without long-term current use of insulin ([facility] (3/16/2019). Past surgical history:  has a past surgical history that includes Hysterectomy; back surgery; and Lung biopsy (Left, 11/13/2017). Home medications:   Prior to Admission medications    Medication Sig Start Date End Date Taking? Authorizing Provider   apixaban (ELIQUIS STARTER PACK) 5 MG TABS tablet Take 10 mg (2 tablets) orally twice daily for 7 days, then take 5 mg (1 tablet) orally twice daily thereafter.  5/25/19   Berkley Gray MD   OXYCODONE HCL, ABUSE DETER, PO Take 10 mg by mouth    Historical Provider, MD   metoprolol succinate (TOPROL XL) 25 MG extended release tablet Take 25 mg by mouth daily    Historical Provider, MD   insulin glargine (LANTUS SOLOSTAR) 100 UNIT/ML injection pen Inject 35 Units into the skin nightly 3/18/19   Nikki Carpenter MD   insulin lispro (HUMALOG KWIKPEN) 100 UNIT/ML pen Inject 25 Units into the skin 3 times daily (before meals) 3/18/19   Nikki Carpenter MD   Insulin Pen Needle (B-D ULTRAFINE III SHORT PEN) 31G X 8 MM MISC 1 each by Does not apply route daily 3/18/19   Nikki Carpenter MD   blood glucose test strips (ACCU-CHEK KRISTINA PLUS) strip Check qid 3/18/19   Nikki Carpenter MD   Blood Glucose Monitoring Suppl (ACCU-CHEK KRISTINA PLUS) w/Device KIT Check qid 3/18/19   Nikki Carpenter MD   Blood Glucose Calibration (ACCU-CHEK KRISTINA) SOLN Check when you use a new box of strips 3/18/19   Nikki Carpenter MD   ACCU-CHEK MULTICLIX LANCETS MISC Check qid 3/18/19   Nikki Carpenter MD   lansoprazole (PREVACID) 15 MG delayed release capsule Take 15 mg by mouth daily    Historical Provider, MD   hydrOXYzine (VISTARIL) 25 MG capsule Take 25 mg by mouth 4 times daily as needed for Itching    Historical Provider, MD   aspirin 81 MG tablet Take 81 mg by mouth daily    Historical Provider, MD   clopidogrel (PLAVIX) 75 MG tablet Take 75 mg by mouth daily    Historical Provider, MD   nitroGLYCERIN (NITROSTAT) 0.4 MG SL tablet Place 0.4 mg under the tongue every 5 minutes as needed for Chest pain (Daily as needed) up to max of 3 total doses. If no relief after 1 dose, call 911. Historical Provider, MD   atorvastatin (LIPITOR) 40 MG tablet Take 40 mg by mouth daily    Historical Provider, MD   hydrochlorothiazide (HYDRODIURIL) 12.5 MG tablet Take 12.5 mg by mouth daily    Historical Provider, MD   levothyroxine (SYNTHROID) 25 MCG tablet Take 100 mcg by mouth Daily     Historical Provider, MD   benzonatate (TESSALON) 100 MG capsule Take 100 mg by mouth 2 times daily as needed for Cough    Historical Provider, MD   fluticasone (FLOVENT HFA) 110 MCG/ACT inhaler Inhale 1 puff into the lungs 2 times daily    Historical Provider, MD   albuterol sulfate  (90 Base) MCG/ACT inhaler Inhale 2 puffs into the lungs every 6 hours as needed for Wheezing    Historical Provider, MD   tiotropium (SPIRIVA RESPIMAT) 2.5 MCG/ACT AERS inhaler Inhale 2 puffs into the lungs daily 10/31/17 11/30/17  Jazmín Mike MD       Social history:  reports that she has been smoking cigarettes. She has a 37.00 pack-year smoking history. She has never used smokeless tobacco. She reports that she does not currently use alcohol. She reports that she does not use drugs. Family history:    Family History   Problem Relation Age of Onset    Stroke Mother     Migraines Mother     Stroke Father     High Cholesterol Father     Depression Sister     Migraines Sister          ROS  Review of Systems   Constitutional:  Negative for chills and fever. HENT:  Positive for sore throat, trouble swallowing and voice change. Negative for congestion and rhinorrhea. Eyes:  Negative for photophobia and visual disturbance. Respiratory:  Negative for cough, shortness of breath and wheezing. Cardiovascular:  Negative for chest pain and palpitations.    Gastrointestinal:  Negative for abdominal distention, diarrhea, nausea and vomiting. Genitourinary:  Negative for dysuria and hematuria. Musculoskeletal:  Positive for neck pain. Negative for back pain. Skin:  Negative for rash and wound. Neurological:  Negative for syncope and weakness. Psychiatric/Behavioral:  Negative for agitation and confusion. Exam  ED Triage Vitals [10/24/22 1455]   BP Temp Temp Source Heart Rate Resp SpO2 Height Weight   114/74 97.7 °F (36.5 °C) Oral 90 18 93 % 5' 7\" (1.702 m) --       Physical Exam  Vitals and nursing note reviewed. Constitutional:       General: She is not in acute distress. Appearance: She is well-developed. HENT:      Head: Normocephalic and atraumatic. Right Ear: Tympanic membrane and ear canal normal.      Left Ear: Tympanic membrane and ear canal normal.      Nose: Nose normal. No congestion. Eyes:      General: No scleral icterus. Extraocular Movements: Extraocular movements intact. Neck:      Comments: Muffled voice. She is handling her secretions. Uvula is midline and there is no obvious tonsillar exudate or erythema. There is cervical lymphadenopathy on the right side that is tender. There is no submental swelling or induration. There is no bruit  Cardiovascular:      Rate and Rhythm: Normal rate and regular rhythm. Heart sounds: No murmur heard. Pulmonary:      Effort: Pulmonary effort is normal.      Breath sounds: Normal breath sounds. Abdominal:      General: There is no distension. Palpations: Abdomen is soft. Tenderness: There is no abdominal tenderness. Musculoskeletal:         General: No deformity. Normal range of motion. Cervical back: Normal range of motion and neck supple. Skin:     General: Skin is warm. Findings: No rash. Neurological:      Mental Status: She is alert and oriented to person, place, and time. Motor: No abnormal muscle tone.       Coordination: Coordination normal.   Psychiatric:         Mood and Affect: Mood normal. Behavior: Behavior normal.         ED Course    ED Medication Orders (From admission, onward)      Start Ordered     Status Ordering Provider    10/24/22 1830 10/24/22 1821  clindamycin (CLEOCIN) 600 mg in dextrose 5 % 50 mL IVPB  ONCE        Question:  Antimicrobial Indications  Answer:  Head and Neck Infection    Last MAR action: New Bag - by Karis Munguia on 10/24/22 at [address] [facility], [address] [address]    10/24/22 1830 10/24/22 1824  ketorolac (TORADOL) injection 15 mg  ONCE         Last MAR action: Given - by Karis Munguia on 10/24/22 at [address] [facility], [address] [facility]    10/24/22 1645 10/24/22 1633  dexamethasone (PF) (DECADRON) injection 10 mg  ONCE         Last MAR action: Given - by Daphne Kay on 10/24/22 at [facility], [address] [facility] [address] [facility]              Radiology  CT SOFT TISSUE NECK WO CONTRAST    Result Date: 10/24/2022  EXAMINATION: CT OF THE NECK WITHOUT CONTRAST  10/24/2022 TECHNIQUE: CT of the neck was performed without the administration of intravenous contrast. Multiplanar reformatted images are provided for review. Automated exposure control, iterative reconstruction, and/or weight based adjustment of the mA/kV was utilized to reduce the radiation dose to as low as reasonably achievable. COMPARISON: None. HISTORY: ORDERING SYSTEM PROVIDED HISTORY: right neck swelling TECHNOLOGIST PROVIDED HISTORY: Reason for exam:->right neck swelling Decision Support Exception - unselect if not a suspected or confirmed emergency medical condition->Emergency Medical Condition (MA) Is the patient pregnant?->No Reason for Exam: rt swelling neck FINDINGS: PHARYNX/LARYNX:  There is fullness right tonsillar fossa worrisome for neoplasm. This measures 2.1 x 2.5 cm in size. There is mass effect and effacement of the right-sided buccal glossal sulcus. Airspace in the right piriform sinus and the aryepiglottic fold as well. Epiglottis is unremarkable. SALIVARY GLANDS/THYROID: The parotid and submandibular glands appear unremarkable.   The thyroid gland appears unremarkable. No sialolithiasis identified. LYMPH NODES:  There are multiple enlarged right-sided level 2 lymph nodes. These could be reactive or could be related to metastasis. SOFT TISSUES:  There is mild nonspecific platysma stranding identified within the right submandibular and adjacent to the right submandibular gland and along the distal muscle. BRAIN/ORBITS/SINUSES:  The visualized portion of the intracranial contents appear unremarkable. The visualized portion of the orbits, paranasal sinuses and mastoid air cells demonstrate no acute abnormality. LUNG APICES/SUPERIOR MEDIASTINUM:  Emphysema. Left paramediastinal interstitial thickening noted perhaps due to nonspecific interstitial thickening and fibrosis. 4 mm right anterior lung nodule partially visualized/evaluated. No superior mediastinal lymphadenopathy or mass. The visualized portion of the trachea appears unremarkable. Left-sided pacemaker device. BONES:  No aggressive appearing lytic or blastic bony lesion. Multilevel degenerative changes. Findings worrisome for right-sided tonsillitis versus tonsillar neoplasm. There is associated right level 2, and right level 3 adenopathy. It is conceivable this could be reactive but given emphysematous changes, neoplasm should be a strong consideration. Haziness identified right submandibular region could be related to cellulitis versus sialoadenitis. Consider follow-up imaging and ENT consultation may be beneficial. 4 mm right solid pulmonary nodule within the upper lobe. A non-contrast Chest CT at 12 months is optional. If performed and the nodule is stable at 12 months, no further follow-up is recommended.         Labs  Results for orders placed or performed during the hospital encounter of 10/24/22   Strep screen group a throat    Specimen: Throat   Result Value Ref Range    Rapid Strep A Screen Negative Negative   CBC with Auto Differential   Result Value Ref Range    WBC 9.7 4.0 - 11.0 K/uL    RBC 5.16 4.00 - 5.20 M/uL    Hemoglobin 15.6 12.0 - 16.0 g/dL    Hematocrit 46.5 36.0 - 48.0 %    MCV 90.3 80.0 - 100.0 fL    MCH 30.2 26.0 - 34.0 pg    MCHC 33.4 31.0 - 36.0 g/dL    RDW 13.9 12.4 - 15.4 %    Platelets 791 871 - 398 K/uL    MPV 9.2 5.0 - 10.5 fL    Neutrophils % 74.8 %    Lymphocytes % 14.5 %    Monocytes % 8.5 %    Eosinophils % 1.2 %    Basophils % 1.0 %    Neutrophils Absolute 7.3 1.7 - 7.7 K/uL    Lymphocytes Absolute 1.4 1.0 - 5.1 K/uL    Monocytes Absolute 0.8 0.0 - 1.3 K/uL    Eosinophils Absolute 0.1 0.0 - 0.6 K/uL    Basophils Absolute 0.1 0.0 - 0.2 K/uL   SPECIMEN REJECTION   Result Value Ref Range    Rejected Test cmpx, proct     Reason for Rejection see below    Comprehensive Metabolic Panel w/ Reflex to MG   Result Value Ref Range    Sodium 132 (L) 136 - 145 mmol/L    Potassium reflex Magnesium 4.7 3.5 - 5.1 mmol/L    Chloride 92 (L) 99 - 110 mmol/L    CO2 22 21 - 32 mmol/L    Anion Gap 18 (H) 3 - 16    Glucose 466 (H) 70 - 99 mg/dL    BUN 11 7 - 20 mg/dL    Creatinine <0.5 (L) 0.6 - 1.1 mg/dL    Est, Glom Filt Rate >60 >60    Calcium 10.3 8.3 - 10.6 mg/dL    Total Protein 8.1 6.4 - 8.2 g/dL    Albumin 4.0 3.4 - 5.0 g/dL    Albumin/Globulin Ratio 1.0 (L) 1.1 - 2.2    Total Bilirubin 0.7 0.0 - 1.0 mg/dL    Alkaline Phosphatase 125 40 - 129 U/L    ALT 19 10 - 40 U/L    AST 17 15 - 37 U/L   Procalcitonin   Result Value Ref Range    Procalcitonin 0.23 (H) 0.00 - 0.15 ng/mL         Procedures  Procedures      MDM  Patient seen and evaluated. Relevant records reviewed. - Patient is 49 y.o. female presented for neck pain dysphagia muffled voice as noted above  - Exam showed as noted above. There are some tonsillar swelling but no uvular deviation not classic for peritonsillar abscess. - She has muffled speech but handling secretions and no stridor. She appears to be borderline improved on reassessment after 5 hours in the ED.  CT noncontrast due to patient allergy showing questionable mass for neoplasm versus tonsillitis. Spoke with ENT recommends admission. Dr. Neema Núñez I am told is rounding here at [facility]. Patient treated with Decadron and clindamycin Toradol. However this is still a concerning symptom and there is still consideration for an impending airway issue. Would benefit from closer airway monitoring. I have sent [employer] message to hospitalist.  - I discussed the results, including any incidental findings, with patient. Questions answered. We agreed to admit. Patient/family agreeable to plan and express understanding of plan. Clinical Impression:  1. Tonsillar mass    2. Acute tonsillitis, unspecified etiology          Disposition:  Admit to CCU/ICU in critical condition. Blood pressure 114/81, pulse 89, temperature 98.5 °F (36.9 °C), temperature source Oral, resp. rate 25, height 5' 7\" (1.702 m), SpO2 93 %. Patient was given scripts for the following medications. I counseled patient how to take these medications. New Prescriptions    No medications on file       Disposition referral (if applicable):  No follow-up provider specified. I, Alison Smith, am the primary attending of record and contributed the majority of evaluation and treatment of emergent care for this encounter. Total critical care time is 45 minutes, which excludes separately billable procedures and updating family. Time spent is specifically for management of the presenting complaint and symptoms initially, direct bedside care, reevaluation, review of records, and consultation. There was a high probability of clinically significant life-threatening deterioration in the patient's condition, which required my urgent intervention. This chart was generated in part by using Dragon Dictation system and may contain errors related to that system including errors in grammar, punctuation, and spelling, as well as words and phrases that may be inappropriate.  If there are any questions or concerns please feel free to contact the dictating provider for clarification.      MD Miky Franklin MD  10/24/22 1956

## 2022-10-24 NOTE — ED NOTES
[id number] ENT consult placed with answering service     Eldon Rodriguez  10/24/22 1829    1830  ENT consult completed by Dr. Albina Aceves  10/24/22 1842

## 2022-10-25 VITALS
SYSTOLIC BLOOD PRESSURE: 94 MMHG | DIASTOLIC BLOOD PRESSURE: 59 MMHG | HEIGHT: 67 IN | BODY MASS INDEX: 26.45 KG/M2 | TEMPERATURE: 97.9 F | HEART RATE: 62 BPM | WEIGHT: 168.5 LBS | OXYGEN SATURATION: 96 % | RESPIRATION RATE: 16 BRPM

## 2022-10-25 PROBLEM — Z79.4 TYPE 2 DIABETES MELLITUS WITH HYPERGLYCEMIA, WITH LONG-TERM CURRENT USE OF INSULIN (HCC): Status: ACTIVE | Noted: 2022-10-25

## 2022-10-25 PROBLEM — J03.90 ACUTE TONSILLITIS: Status: ACTIVE | Noted: 2022-10-25

## 2022-10-25 PROBLEM — E11.65 TYPE 2 DIABETES MELLITUS WITH HYPERGLYCEMIA, WITH LONG-TERM CURRENT USE OF INSULIN (HCC): Status: ACTIVE | Noted: 2022-10-25

## 2022-10-25 LAB
ANION GAP SERPL CALCULATED.3IONS-SCNC: 15 MMOL/L (ref 3–16)
BASOPHILS ABSOLUTE: 0 K/UL (ref 0–0.2)
BASOPHILS RELATIVE PERCENT: 0.4 %
BUN BLDV-MCNC: 19 MG/DL (ref 7–20)
CALCIUM SERPL-MCNC: 9.5 MG/DL (ref 8.3–10.6)
CHLORIDE BLD-SCNC: 94 MMOL/L (ref 99–110)
CO2: 23 MMOL/L (ref 21–32)
CREAT SERPL-MCNC: 0.6 MG/DL (ref 0.6–1.1)
EOSINOPHILS ABSOLUTE: 0 K/UL (ref 0–0.6)
EOSINOPHILS RELATIVE PERCENT: 0 %
ESTIMATED AVERAGE GLUCOSE: 392.4 MG/DL
GFR SERPL CREATININE-BSD FRML MDRD: >60 ML/MIN/{1.73_M2}
GLUCOSE BLD-MCNC: 366 MG/DL (ref 70–99)
GLUCOSE BLD-MCNC: 374 MG/DL (ref 70–99)
GLUCOSE BLD-MCNC: 398 MG/DL (ref 70–99)
GLUCOSE BLD-MCNC: 422 MG/DL (ref 70–99)
GLUCOSE BLD-MCNC: 497 MG/DL (ref 70–99)
GLUCOSE BLD-MCNC: 532 MG/DL (ref 70–99)
GLUCOSE BLD-MCNC: 583 MG/DL (ref 70–99)
HBA1C MFR BLD: 15.3 %
HCT VFR BLD CALC: 41.8 % (ref 36–48)
HEMOGLOBIN: 14.1 G/DL (ref 12–16)
INR BLD: 1.08 (ref 0.87–1.14)
LYMPHOCYTES ABSOLUTE: 1.2 K/UL (ref 1–5.1)
LYMPHOCYTES RELATIVE PERCENT: 11.3 %
MCH RBC QN AUTO: 30.1 PG (ref 26–34)
MCHC RBC AUTO-ENTMCNC: 33.8 G/DL (ref 31–36)
MCV RBC AUTO: 88.9 FL (ref 80–100)
MONOCYTES ABSOLUTE: 0.5 K/UL (ref 0–1.3)
MONOCYTES RELATIVE PERCENT: 5 %
NEUTROPHILS ABSOLUTE: 8.6 K/UL (ref 1.7–7.7)
NEUTROPHILS RELATIVE PERCENT: 83.3 %
PDW BLD-RTO: 13 % (ref 12.4–15.4)
PERFORMED ON: ABNORMAL
PLATELET # BLD: 178 K/UL (ref 135–450)
PMV BLD AUTO: 8.9 FL (ref 5–10.5)
POTASSIUM REFLEX MAGNESIUM: 3.9 MMOL/L (ref 3.5–5.1)
PROTHROMBIN TIME: 13.8 SEC (ref 11.7–14.5)
RBC # BLD: 4.7 M/UL (ref 4–5.2)
SODIUM BLD-SCNC: 132 MMOL/L (ref 136–145)
TSH REFLEX: 3.48 UIU/ML (ref 0.27–4.2)
WBC # BLD: 10.3 K/UL (ref 4–11)

## 2022-10-25 PROCEDURE — 6360000002 HC RX W HCPCS: Performed by: INTERNAL MEDICINE

## 2022-10-25 PROCEDURE — 83036 HEMOGLOBIN GLYCOSYLATED A1C: CPT

## 2022-10-25 PROCEDURE — 2580000003 HC RX 258: Performed by: INTERNAL MEDICINE

## 2022-10-25 PROCEDURE — 36415 COLL VENOUS BLD VENIPUNCTURE: CPT

## 2022-10-25 PROCEDURE — 80048 BASIC METABOLIC PNL TOTAL CA: CPT

## 2022-10-25 PROCEDURE — 84443 ASSAY THYROID STIM HORMONE: CPT

## 2022-10-25 PROCEDURE — 6370000000 HC RX 637 (ALT 250 FOR IP): Performed by: INTERNAL MEDICINE

## 2022-10-25 PROCEDURE — 85025 COMPLETE CBC W/AUTO DIFF WBC: CPT

## 2022-10-25 PROCEDURE — 85610 PROTHROMBIN TIME: CPT

## 2022-10-25 PROCEDURE — 2500000003 HC RX 250 WO HCPCS: Performed by: INTERNAL MEDICINE

## 2022-10-25 PROCEDURE — 99238 HOSP IP/OBS DSCHRG MGMT 30/<: CPT | Performed by: INTERNAL MEDICINE

## 2022-10-25 RX ORDER — ALBUTEROL SULFATE 90 UG/1
2 AEROSOL, METERED RESPIRATORY (INHALATION) EVERY 6 HOURS PRN
Status: DISCONTINUED | OUTPATIENT
Start: 2022-10-25 | End: 2022-10-25 | Stop reason: HOSPADM

## 2022-10-25 RX ORDER — INSULIN LISPRO 100 [IU]/ML
15 INJECTION, SOLUTION INTRAVENOUS; SUBCUTANEOUS ONCE
Status: COMPLETED | OUTPATIENT
Start: 2022-10-25 | End: 2022-10-25

## 2022-10-25 RX ORDER — CLINDAMYCIN HYDROCHLORIDE 300 MG/1
300 CAPSULE ORAL 3 TIMES DAILY
Qty: 30 CAPSULE | Refills: 0 | Status: SHIPPED | OUTPATIENT
Start: 2022-10-25 | End: 2022-11-04

## 2022-10-25 RX ORDER — POLYETHYLENE GLYCOL 3350 17 G/17G
17 POWDER, FOR SOLUTION ORAL DAILY PRN
Status: DISCONTINUED | OUTPATIENT
Start: 2022-10-25 | End: 2022-10-25 | Stop reason: HOSPADM

## 2022-10-25 RX ORDER — INSULIN GLARGINE 100 [IU]/ML
15 INJECTION, SOLUTION SUBCUTANEOUS NIGHTLY
Status: DISCONTINUED | OUTPATIENT
Start: 2022-10-25 | End: 2022-10-25 | Stop reason: HOSPADM

## 2022-10-25 RX ORDER — MAGNESIUM SULFATE 1 G/100ML
1000 INJECTION INTRAVENOUS PRN
Status: DISCONTINUED | OUTPATIENT
Start: 2022-10-25 | End: 2022-10-25 | Stop reason: HOSPADM

## 2022-10-25 RX ORDER — SODIUM CHLORIDE 0.9 % (FLUSH) 0.9 %
5-40 SYRINGE (ML) INJECTION EVERY 12 HOURS SCHEDULED
Status: DISCONTINUED | OUTPATIENT
Start: 2022-10-25 | End: 2022-10-25 | Stop reason: HOSPADM

## 2022-10-25 RX ORDER — SODIUM CHLORIDE 0.9 % (FLUSH) 0.9 %
5-40 SYRINGE (ML) INJECTION PRN
Status: DISCONTINUED | OUTPATIENT
Start: 2022-10-25 | End: 2022-10-25 | Stop reason: HOSPADM

## 2022-10-25 RX ORDER — ONDANSETRON 4 MG/1
4 TABLET, ORALLY DISINTEGRATING ORAL EVERY 8 HOURS PRN
Status: DISCONTINUED | OUTPATIENT
Start: 2022-10-25 | End: 2022-10-25 | Stop reason: HOSPADM

## 2022-10-25 RX ORDER — CLINDAMYCIN PHOSPHATE 600 MG/50ML
600 INJECTION INTRAVENOUS EVERY 8 HOURS
Status: DISCONTINUED | OUTPATIENT
Start: 2022-10-25 | End: 2022-10-25 | Stop reason: HOSPADM

## 2022-10-25 RX ORDER — NICOTINE 21 MG/24HR
1 PATCH, TRANSDERMAL 24 HOURS TRANSDERMAL DAILY
Status: DISCONTINUED | OUTPATIENT
Start: 2022-10-25 | End: 2022-10-25 | Stop reason: HOSPADM

## 2022-10-25 RX ORDER — POTASSIUM CHLORIDE 7.45 MG/ML
10 INJECTION INTRAVENOUS PRN
Status: DISCONTINUED | OUTPATIENT
Start: 2022-10-25 | End: 2022-10-25 | Stop reason: HOSPADM

## 2022-10-25 RX ORDER — INSULIN LISPRO 100 [IU]/ML
0-8 INJECTION, SOLUTION INTRAVENOUS; SUBCUTANEOUS EVERY 4 HOURS
Status: DISCONTINUED | OUTPATIENT
Start: 2022-10-25 | End: 2022-10-25 | Stop reason: HOSPADM

## 2022-10-25 RX ORDER — ENOXAPARIN SODIUM 100 MG/ML
40 INJECTION SUBCUTANEOUS DAILY
Status: DISCONTINUED | OUTPATIENT
Start: 2022-10-25 | End: 2022-10-25 | Stop reason: HOSPADM

## 2022-10-25 RX ORDER — DEXAMETHASONE SODIUM PHOSPHATE 4 MG/ML
4 INJECTION, SOLUTION INTRA-ARTICULAR; INTRALESIONAL; INTRAMUSCULAR; INTRAVENOUS; SOFT TISSUE EVERY 12 HOURS
Status: DISCONTINUED | OUTPATIENT
Start: 2022-10-25 | End: 2022-10-25 | Stop reason: HOSPADM

## 2022-10-25 RX ORDER — SODIUM CHLORIDE 9 MG/ML
INJECTION, SOLUTION INTRAVENOUS PRN
Status: DISCONTINUED | OUTPATIENT
Start: 2022-10-25 | End: 2022-10-25 | Stop reason: HOSPADM

## 2022-10-25 RX ORDER — DEXTROSE MONOHYDRATE 100 MG/ML
INJECTION, SOLUTION INTRAVENOUS CONTINUOUS PRN
Status: DISCONTINUED | OUTPATIENT
Start: 2022-10-25 | End: 2022-10-25 | Stop reason: HOSPADM

## 2022-10-25 RX ORDER — LACTOBACILLUS RHAMNOSUS GG 10B CELL
1 CAPSULE ORAL 2 TIMES DAILY
Qty: 30 CAPSULE | Refills: 0 | Status: SHIPPED | OUTPATIENT
Start: 2022-10-25

## 2022-10-25 RX ORDER — ONDANSETRON 2 MG/ML
4 INJECTION INTRAMUSCULAR; INTRAVENOUS EVERY 6 HOURS PRN
Status: DISCONTINUED | OUTPATIENT
Start: 2022-10-25 | End: 2022-10-25 | Stop reason: HOSPADM

## 2022-10-25 RX ORDER — SODIUM CHLORIDE 9 MG/ML
INJECTION, SOLUTION INTRAVENOUS CONTINUOUS
Status: DISCONTINUED | OUTPATIENT
Start: 2022-10-25 | End: 2022-10-25 | Stop reason: HOSPADM

## 2022-10-25 RX ADMIN — INSULIN LISPRO 15 UNITS: 100 INJECTION, SOLUTION INTRAVENOUS; SUBCUTANEOUS at 01:36

## 2022-10-25 RX ADMIN — ACETAMINOPHEN 650 MG: 325 TABLET ORAL at 03:19

## 2022-10-25 RX ADMIN — CLINDAMYCIN PHOSPHATE 600 MG: 600 INJECTION, SOLUTION INTRAVENOUS at 04:33

## 2022-10-25 RX ADMIN — INSULIN GLARGINE 15 UNITS: 100 INJECTION, SOLUTION SUBCUTANEOUS at 01:37

## 2022-10-25 RX ADMIN — DEXAMETHASONE SODIUM PHOSPHATE 4 MG: 4 INJECTION, SOLUTION INTRAMUSCULAR; INTRAVENOUS at 09:22

## 2022-10-25 RX ADMIN — ENOXAPARIN SODIUM 40 MG: 100 INJECTION SUBCUTANEOUS at 09:22

## 2022-10-25 RX ADMIN — INSULIN LISPRO 8 UNITS: 100 INJECTION, SOLUTION INTRAVENOUS; SUBCUTANEOUS at 08:28

## 2022-10-25 RX ADMIN — INSULIN LISPRO 8 UNITS: 100 INJECTION, SOLUTION INTRAVENOUS; SUBCUTANEOUS at 03:23

## 2022-10-25 RX ADMIN — ACETAMINOPHEN 650 MG: 325 TABLET ORAL at 09:22

## 2022-10-25 RX ADMIN — SODIUM CHLORIDE: 9 INJECTION, SOLUTION INTRAVENOUS at 02:20

## 2022-10-25 ASSESSMENT — PAIN DESCRIPTION - DESCRIPTORS
DESCRIPTORS: SHARP
DESCRIPTORS: ACHING

## 2022-10-25 ASSESSMENT — PAIN SCALES - GENERAL
PAINLEVEL_OUTOF10: 8
PAINLEVEL_OUTOF10: 3
PAINLEVEL_OUTOF10: 8

## 2022-10-25 ASSESSMENT — PAIN DESCRIPTION - LOCATION
LOCATION: BACK;HEAD
LOCATION: HEAD

## 2022-10-25 NOTE — DISCHARGE INSTRUCTIONS
Your information:  Name: Yasemin Frederick  : 1970    Your instructions: Follow instructions below. Follow up with Dr Cuco Noland  [phone]    What to do after you leave the hospital:    Recommended diet: clear liquids and diabetic diet    Recommended activity: activity as tolerated        The following personal items were collected during your admission and were returned to you:    Belongings  Dental Appliances: Uppers, Lowers  Vision - Corrective Lenses: Eyeglasses  Hearing Aid: None  Clothing: Pants, Jacket/Coat, Footwear, Shirt  Jewelry: None  Body Piercings Removed: N/A  Electronic Devices: Cell Phone,   Weapons (Notify Protective Services/Security): None  Other Valuables: Purse  Home Medications: Sent home  Valuables Given To: Patient  Provide Name(s) of Who Valuable(s) Were Given To: patient  Responsible person(s) in the waiting room: patient  Patient approves for provider to speak to responsible person post operatively: Yes    Information obtained by:  By signing below, I understand that if any problems occur once I leave the hospital I am to contact MD.  I understand and acknowledge receipt of the instructions indicated above.

## 2022-10-25 NOTE — FLOWSHEET NOTE
10/25/22 0044   Vital Signs   Temp 97.1 °F (36.2 °C)   Temp Source Oral   Heart Rate 73   Heart Rate Source Monitor   Resp 16   BP 96/71   BP Location Right upper arm   BP Method Automatic   MAP (Calculated) 79.33   Patient Position Semi fowlers   Level of Consciousness 0   MEWS Score 2   Opioid-Induced Sedation   POSS Score 1   RASS   Finch Agitation Sedation Scale (RASS) 0   Oxygen Therapy   SpO2 90 %   Pulse Oximetry Type Intermittent   Pulse Oximeter Device Mode Intermittent   Pulse Oximeter Device Location Right;Finger   O2 Device None (Room air)     Patient arrived to floor from ED. Patient A+Ox4, vitals and assessments done at this time. Bed in lowest position, call light within reach.

## 2022-10-25 NOTE — H&P
Hospital Medicine History & Physical      PCP: Maira Oquendo DO    Date of Service: Pt seen/examined on 10/24/22 and admitted on 10/24/22 to Inpatient    Chief Complaint   Patient presents with    Oral Swelling     Pt states some swelling in her throat x2 days. Pt states that she has had some difficulty swallowing. Pt states that when she tried to swallow water today that it came out of her nose. Pt states that is why she decided to come to ED. History Of Present Illness: The patient is a 49 y.o. female with PMH below, presents with throat swelling, difficulty swallowing, subjective fever/chills, voice change, N/V. The above Sx began 2 d ago and have been progressively worsening since the onset. She has had difficulty swallowing. She tried to drink water today and it came out of her nose. Pain to R throat/neck, moderate, worsening. Today she had 1 episode of N/V. She was found to have R tonsillar swelling on imaging. She has Hx of lung cancer and is followed by [facility]anch[facility] Mann. Past Medical History:        Diagnosis Date    Anemia     Cancer ([address] 2016    non small cell lung cancer    Depression     Diabetes mellitus ([address]     Emphysema     Emphysema of lung ([address]     Hyperlipidemia     Hypoglycemia     Psychiatric problem     Seizures ([address]     Type 2 diabetes mellitus with hyperglycemia, without long-term current use of insulin ([address] 3/16/2019       Past Surgical History:        Procedure Laterality Date    BACK SURGERY      HYSTERECTOMY (CERVIX STATUS UNKNOWN)      LUNG BIOPSY Left 11/13/2017       Medications Prior to Admission:    Prior to Admission medications    Medication Sig Start Date End Date Taking? Authorizing Provider   apixaban (ELIQUIS STARTER PACK) 5 MG TABS tablet Take 10 mg (2 tablets) orally twice daily for 7 days, then take 5 mg (1 tablet) orally twice daily thereafter.  5/25/19   Althea Montaño MD   OXYCODONE HCL, ABUSE DETER, PO Take 10 mg by mouth    Historical Provider, MD   metoprolol succinate (TOPROL XL) 25 MG extended release tablet Take 25 mg by mouth daily    Historical Provider, MD   insulin glargine (LANTUS SOLOSTAR) 100 UNIT/ML injection pen Inject 35 Units into the skin nightly 3/18/19   Bam Washington MD   insulin lispro (HUMALOG KWIKPEN) 100 UNIT/ML pen Inject 25 Units into the skin 3 times daily (before meals) 3/18/19   Bam Washington MD   Insulin Pen Needle (B-D ULTRAFINE III SHORT PEN) 31G X 8 MM MISC 1 each by Does not apply route daily 3/18/19   Bam Washington MD   blood glucose test strips (ACCU-CHEK KRISTINA PLUS) strip Check qid 3/18/19   Bam Washington MD   Blood Glucose Monitoring Suppl (ACCU-CHEK KRISTINA PLUS) w/Device KIT Check qid 3/18/19   Bam Washington MD   Blood Glucose Calibration (ACCU-CHEK KRISTINA) SOLN Check when you use a new box of strips 3/18/19   Bam Washington MD   ACCU-CHEK MULTICLIX LANCETS MISC Check qid 3/18/19   Bam Washington MD   lansoprazole (PREVACID) 15 MG delayed release capsule Take 15 mg by mouth daily    Historical Provider, MD   hydrOXYzine (VISTARIL) 25 MG capsule Take 25 mg by mouth 4 times daily as needed for Itching    Historical Provider, MD   aspirin 81 MG tablet Take 81 mg by mouth daily    Historical Provider, MD   clopidogrel (PLAVIX) 75 MG tablet Take 75 mg by mouth daily    Historical Provider, MD   nitroGLYCERIN (NITROSTAT) 0.4 MG SL tablet Place 0.4 mg under the tongue every 5 minutes as needed for Chest pain (Daily as needed) up to max of 3 total doses. If no relief after 1 dose, call 911.     Historical Provider, MD   atorvastatin (LIPITOR) 40 MG tablet Take 40 mg by mouth daily    Historical Provider, MD   hydrochlorothiazide (HYDRODIURIL) 12.5 MG tablet Take 12.5 mg by mouth daily    Historical Provider, MD   levothyroxine (SYNTHROID) 25 MCG tablet Take 100 mcg by mouth Daily     Historical Provider, MD   benzonatate (TESSALON) 100 MG capsule Take 100 mg by mouth 2 times daily as needed for Cough    Historical Provider, MD   fluticasone (FLOVENT HFA) 110 MCG/ACT inhaler Inhale 1 puff into the lungs 2 times daily    Historical Provider, MD   albuterol sulfate  (90 Base) MCG/ACT inhaler Inhale 2 puffs into the lungs every 6 hours as needed for Wheezing    Historical Provider, MD   tiotropium (SPIRIVA RESPIMAT) 2.5 MCG/ACT AERS inhaler Inhale 2 puffs into the lungs daily 10/31/17 11/30/17  Daphne Pryor MD       Allergies:  Amoxicillin, Mushroom extract complex, Pcn [penicillins], Coconut oil, Iv dye [iodides], Percocet [oxycodone-acetaminophen], Shellfish-derived products, Tramadol, and Tylenol with codeine [acetaminophen-codeine]    Social History:    TOBACCO:   reports that she has been smoking cigarettes. She has a 37.00 pack-year smoking history. She has never used smokeless tobacco.  ETOH:   reports that she does not currently use alcohol. Family History:  Reviewed in detail and negative for DM, Early CAD, Cancer (except as below). Positive as follows:        Problem Relation Age of Onset    Stroke Mother     Migraines Mother     Stroke Father     High Cholesterol Father     Depression Sister     Migraines Sister        REVIEW OF SYSTEMS:   Pertinent positives/negatives as follows: throat swelling, difficulty swallowing, subjective fever/chills, voice change, N/V, and as discussed in HPI, otherwise a complete ROS performed and all other systems are negative. PHYSICAL EXAM PERFORMED:  /81   Pulse 89   Temp 98.5 °F (36.9 °C) (Oral)   Resp 25   Ht 5' 7\" (1.702 m)   SpO2 93%   BMI 28.82 kg/m²   GEN:  A&Ox3, NAD. HEENT:  NC/AT,EOMI, MMM, no erythema/exudates or visible masses in throat. R neck ttp. Lymphadenopathy palpable in R cervical chain. Appears to be managing secretions. No stridor appreciated. Voice is muffled and raspy. CVS:  Normal S1,S2. RRR. Without M/G/R.   LUNG:   CTA-B. No wheezes, rales or rhonchi.   ABD: Soft, ND/NT, BS+ x4. Without G/R.  EXT: 2+ pulses, no c/c/e. Brisk cap refill. PSY:  Thought process intact, affect appropriate. ETHAN:  CN III-XII grossly intact except for L facial droop. Moves all spontaneously except for LUE which has residual weakness 2/2 remote stroke. Sensory grossly intact. SKIN: No rash or lesions on visible skin. Chart review shows recent radiographs:  CT SOFT TISSUE NECK WO CONTRAST    Result Date: 10/24/2022  EXAMINATION: CT OF THE NECK WITHOUT CONTRAST  10/24/2022 TECHNIQUE: CT of the neck was performed without the administration of intravenous contrast. Multiplanar reformatted images are provided for review. Automated exposure control, iterative reconstruction, and/or weight based adjustment of the mA/kV was utilized to reduce the radiation dose to as low as reasonably achievable. COMPARISON: None. HISTORY: ORDERING SYSTEM PROVIDED HISTORY: right neck swelling TECHNOLOGIST PROVIDED HISTORY: Reason for exam:->right neck swelling Decision Support Exception - unselect if not a suspected or confirmed emergency medical condition->Emergency Medical Condition (MA) Is the patient pregnant?->No Reason for Exam: rt swelling neck FINDINGS: PHARYNX/LARYNX:  There is fullness right tonsillar fossa worrisome for neoplasm. This measures 2.1 x 2.5 cm in size. There is mass effect and effacement of the right-sided buccal glossal sulcus. Airspace in the right piriform sinus and the aryepiglottic fold as well. Epiglottis is unremarkable. SALIVARY GLANDS/THYROID: The parotid and submandibular glands appear unremarkable. The thyroid gland appears unremarkable. No sialolithiasis identified. LYMPH NODES:  There are multiple enlarged right-sided level 2 lymph nodes. These could be reactive or could be related to metastasis.  SOFT TISSUES:  There is mild nonspecific platysma stranding identified within the right submandibular and adjacent to the right submandibular gland and along the distal muscle. BRAIN/ORBITS/SINUSES:  The visualized portion of the intracranial contents appear unremarkable. The visualized portion of the orbits, paranasal sinuses and mastoid air cells demonstrate no acute abnormality. LUNG APICES/SUPERIOR MEDIASTINUM:  Emphysema. Left paramediastinal interstitial thickening noted perhaps due to nonspecific interstitial thickening and fibrosis. 4 mm right anterior lung nodule partially visualized/evaluated. No superior mediastinal lymphadenopathy or mass. The visualized portion of the trachea appears unremarkable. Left-sided pacemaker device. BONES:  No aggressive appearing lytic or blastic bony lesion. Multilevel degenerative changes. Findings worrisome for right-sided tonsillitis versus tonsillar neoplasm. There is associated right level 2, and right level 3 adenopathy. It is conceivable this could be reactive but given emphysematous changes, neoplasm should be a strong consideration. Haziness identified right submandibular region could be related to cellulitis versus sialoadenitis. Consider follow-up imaging and ENT consultation may be beneficial. 4 mm right solid pulmonary nodule within the upper lobe. A non-contrast Chest CT at 12 months is optional. If performed and the nodule is stable at 12 months, no further follow-up is recommended. CBC:  Recent Labs     10/24/22  1652   WBC 9.7   HGB 15.6   HCT 46.5         RENAL  Recent Labs     10/24/22  1823   *   K 4.7   CL 92*   CO2 22   BUN 11   CREATININE <0.5*   GLUCOSE 466*     Hemoglobin a1c:  Lab Results   Component Value Date    LABA1C 14.9 03/17/2019       LFT'S:  Recent Labs     10/24/22  1823   AST 17   ALT 19   BILITOT 0.7   ALKPHOS 125     COAG:  INR added on.       CARDIAC ENZYMES:   Lab Results   Component Value Date    PROBNP 129 (H) 10/22/2021    PROBNP 29 18/07/6134     PHYSICIAN CERTIFICATION  I certify that Jesse Whatley is expected to be hospitalized for 2 midnights based on the following assessment and plan:    ASSESSMENT/PLAN:  Throat swelling, R, infectious (tonsillitis) vs pharyngeal neoplasm. Hx of lung cancer, followed by Dr. Vaibhav Madrid. Admitted at ENT on call's request.   Appears to be protecting airway and is managing secretions. Per ED, IV clinda and decadron requested by ENT on-call, ordered. PRN toradol/morphine for pain. IVF, PRN phenol spray. Does not meet SIRS/qSOFA at this time. PCT is mildly elevated at 0.23. F/u Cx. N/V, prn antiemetics. DM2, uncontrolled, initial . Hold oral Rx, chk A1c, add Mod SSI q4h, continue home Lantus at reduced dose while NPO.    CAD/HLD/HTN/hypothyroid/chronic pain? Pt reports not taking Eliquis, ASA, Plavix, statin, LT4, BP Rx, oxycodone. Removed from Med Rec. BP acceptable. Chk TSH. Last picked up oxycodone 10 mg #90 on 9/22 but reported to RN not taking any longer. Hx lung cancer, followed by Dr. Vaibhav Madrid. Defer to DD for c/s if needed. Tobacco Abuse, counseled cessation, 14 mg nicotine patch. Lung nodule, 4mm, Rads rec f/u imaging in 12 mos. DVT Prophylaxis: Lx  Diet: NPO  Code Status: Full Code   PT/OT Eval Status: Will order if needed and as patient condition allows  Dispo - Admit to inpatient     Ivy Naranjo MD    Thank you Danica Emanuel DO for the opportunity to be involved in this patient's care. If you have any questions or concerns please feel free to contact me via the [employer] at [phone]. This chart was generated using the [employer] dictation system. I created this record but it may contain dictation errors given the limitations of this technology.

## 2022-10-25 NOTE — PROGRESS NOTES
Chief Complaint   Patient presents with    Oral Swelling     Pt states some swelling in her throat x2 days. Pt states that she has had some difficulty swallowing. Pt states that when she tried to swallow water today that it came out of her nose. Pt states that is why she decided to come to ED. MEWS Score: 1/ Level of Consciousness: Alert (0)    Vitals:    10/24/22 1455 10/24/22 1852 10/24/22 1856   BP: 114/74 114/81    Pulse: 90 89    Resp: 18 25    Temp: 97.7 °F (36.5 °C)  98.5 °F (36.9 °C)   TempSrc: Oral  Oral   SpO2: 93% 93%    Height: 5' 7\" (1.702 m)            Allergies   Allergen Reactions    Amoxicillin Anaphylaxis    Mushroom Extract Complex Anaphylaxis    Pcn [Penicillins] Other (See Comments)     Pt states that she was in a coma for 5 days    Coconut Oil     Iv Dye [Iodides]     Percocet [Oxycodone-Acetaminophen]      Tolerates Vicodin. Shellfish-Derived Products     Tramadol     Tylenol With Codeine [Acetaminophen-Codeine]      Pt states she can take codeine, and take tylenol-just cant take the combination  Tolerates Vicodin.        Current Facility-Administered Medications   Medication Dose Route Frequency Provider Last Rate Last Admin    phenol 1.4 % mouth spray 1 spray  1 spray Mouth/Throat Q2H PRN Hugh Ann MD        morphine (PF) injection 2 mg  2 mg IntraVENous Q2H PRN Hugh Ann MD   2 mg at 10/24/22 2130    Or    morphine sulfate (PF) injection 4 mg  4 mg IntraVENous Q2H PRN Hugh Ann MD         Current Outpatient Medications   Medication Sig Dispense Refill    insulin glargine (LANTUS SOLOSTAR) 100 UNIT/ML injection pen Inject 35 Units into the skin nightly 5 pen 2    insulin lispro (HUMALOG KWIKPEN) 100 UNIT/ML pen Inject 25 Units into the skin 3 times daily (before meals) 7 pen 3    Insulin Pen Needle (B-D ULTRAFINE III SHORT PEN) 31G X 8 MM MISC 1 each by Does not apply route daily 100 each 3    blood glucose test strips (ACCU-CHEK KRISTINA PLUS) strip Check qid 150 each 3    Blood Glucose Monitoring Suppl (ACCU-CHEK KRISTINA PLUS) w/Device KIT Check qid 1 kit 0    Blood Glucose Calibration (ACCU-CHEK KRISTINA) SOLN Check when you use a new box of strips 1 each 0    ACCU-CHEK MULTICLIX LANCETS MISC Check qid 200 each 2    levothyroxine (SYNTHROID) 25 MCG tablet Take 100 mcg by mouth Daily  (Patient not taking: Reported on 10/24/2022)      fluticasone (FLOVENT HFA) 110 MCG/ACT inhaler Inhale 1 puff into the lungs 2 times daily      albuterol sulfate  (90 Base) MCG/ACT inhaler Inhale 2 puffs into the lungs every 6 hours as needed for Wheezing      tiotropium (SPIRIVA RESPIMAT) 2.5 MCG/ACT AERS inhaler Inhale 2 puffs into the lungs daily 1 Inhaler 1      List of medications patient is currently taking is complete. Source of medications in list are complete. Patient Active Problem List   Diagnosis    Mood swings    Cervical lymphadenopathy    Tobacco abuse    Lung mass    Mediastinal adenopathy    Iatrogenic pneumothorax    Atelectasis    Panlobular emphysema (HCC)    Chest pain    Type 2 diabetes mellitus with hyperglycemia, without long-term current use of insulin (HCC)    Dyspnea and respiratory abnormalities    Hyperglycemia    Coronary artery disease involving native coronary artery of native heart with angina pectoris (HCC)    Small cell carcinoma (HCC)    Ischemic cardiomyopathy    Chronic systolic congestive heart failure (HCC)    Abnormal ECG    Throat swelling                     Vitals:    10/24/22 1455 10/24/22 1852 10/24/22 1856   BP: 114/74 114/81    Pulse: 90 89    Resp: 18 25    Temp: 97.7 °F (36.5 °C)  98.5 °F (36.9 °C)   TempSrc: Oral  Oral   SpO2: 93% 93%    Height: 5' 7\" (1.702 m)            -----------------------------------------------------------------------------------------    Pt was updated about admission.              *To be filled out after report is complete*    Bed assignment: Med surg 208    Report called to Colletta Browns on @ [facility] . Pertinent labs, allergies, medications and conditions were reviewed. Present Skin issues include multiple scratches and abrasions (self inflicted).             Electronically signed by Emery Pulido RN on 10/24/22 at 11:12 PM EDT

## 2022-10-25 NOTE — PROGRESS NOTES
4 Eyes Skin Assessment     The patient is being assess for   Admission    I agree that 2 RN's have performed a thorough Head to Toe Skin Assessment on the patient. ALL assessment sites listed below have been assessed. Areas assessed for pressure by both nurses:   [x]   Head, Face, and Ears   [x]   Shoulders, Back, and Chest, Abdomen  [x]   Arms, Elbows, and Hands   [x]   Coccyx, Sacrum, and Ischium  [x]   Legs, Feet, and Heels    Patient has scabs and bruising bilateral arms and legs, no non-healing wounds. Skin Assessed Under all Medical Devices by both nurses:  N/A               All Mepilex Borders were peeled back and area peeked at by both nurses:  No: N/A  Please list where Mepilex Borders are located:  N/A             **SHARE this note so that the co-signing nurse is able to place an eSignature**    Co-signer eSignature: Electronically signed by John Dozier RN on 10/25/22 at 5:14 AM EDT    Does the Patient have Skin Breakdown related to pressure? No              Alonso Prevention initiated:  NA   Wound Care Orders initiated:  NA      [facility] nurse consulted for Pressure Injury (Stage 3,4, Unstageable, DTI, NWPT, Complex wounds)and New or Established Ostomies:  NA      Primary Nurse eSignature: Electronically signed by Elyse Johnson RN on 10/25/22 at 5:00 AM EDT    Patient is able to demonstrate the ability to move from a reclining position to an upright position within the recliner. Bedside Mobility Assessment Tool (BMAT):     Assessment Level 1- Sit and Shake    1. From a semi-reclined position, ask patient to sit up and rotate to a seated position at the side of the bed. Can use the bedrail. 2. Ask patient to reach out and grab your hand and shake making sure patient reaches across his/her midline. Pass- Patient is able to come to a seated position, maintain core strength. Maintains seated balance while reaching across midline. Move on to Assessment Level 2.      Assessment Level 2- Stretch and Point   1. With patient in seated position at the side of the bed, have patient place both feet on the floor (or stool) with knees no higher than hips. 2. Ask patient to stretch one leg and straighten the knee, then bend the ankle/flex and point the toes. If appropriate, repeat with the other leg. Pass- Patient is able to demonstrate appropriate quad strength on intended weight bearing limb(s). Move onto Assessment Level 3. Assessment Level 3- Stand   1. Ask patient to elevate off the bed or chair (seated to standing) using an assistive device (cane, bedrail). 2. Patient should be able to raise buttocks off be and hold for a count of five. May repeat once. Pass- Patient maintains standing stability for at least 5 seconds, proceed to assessment level 4. Assessment Level 4- Walk   1. Ask patient to march in place at bedside. 2. Then ask patient to advance step and return each foot. Some medical conditions may render a patient from stepping backwards, use your best clinical judgement. Pass- Patient demonstrates balance while shifting weight and ability to step, takes independent steps, does not use assistive device patient is MOBILITY LEVEL 4.       Mobility Level- 4

## 2022-10-25 NOTE — DISCHARGE SUMMARY
Name:  Elise Lennox  Room:  0208/0208-01  MRN:    [id number]    Discharge Summary      This discharge summary is in conjunction with a complete physical exam done on the day of discharge. Discharging Physician: Dr. Rafaela Lowery: 10/24/2022  Discharge:  10/25/2022    HPI taken from admission H&P:    The patient is a 49 y.o. female with PMH below, presents with throat swelling, difficulty swallowing, subjective fever/chills, voice change, N/V. The above Sx began 2 d ago and have been progressively worsening since the onset. She has had difficulty swallowing. She tried to drink water today and it came out of her nose. Pain to R throat/neck, moderate, worsening. Today she had 1 episode of N/V. She was found to have R tonsillar swelling on imaging. She has Hx of lung cancer and is followed by Juancho Mann. Diagnoses this Admission and Hospital Course   Throat swelling, R, infectious (tonsillitis) vs pharyngeal neoplasm. Hx of lung cancer, followed by Dr. Verónica Bishop. Admitted at ENT on call's request.   Appears to be protecting airway and is managing secretions. Per ED, IV clinda and decadron requested by ENT on-call, ordered. PRN toradol/morphine for pain. IVF, PRN phenol spray. Does not meet SIRS/qSOFA at this time. PCT is mildly elevated at 0.23. F/u Cx.   -> Patient has been evaluated by ENT and cleared for discharge today. She is anxious to go home. We will discharge her on oral antibiotics clindamycin she needs close follow-up with ENT within a week. N/V, prn antiemetics. DM2, uncontrolled, initial . Hold oral Rx, chk A1c, add Mod SSI q4h  - -Discussed with patient. She says she does not take her Lantus anymore . Continue sliding scale as at home. CAD/HLD/HTN/hypothyroid/chronic pain? Pt reports not taking Eliquis, ASA, Plavix, statin, LT4, BP Rx, oxycodone. Removed from Med Rec. BP acceptable. Chk TSH.   Last picked up oxycodone 10 mg #90 on 9/22 but reported to RN not taking any longer. Hx lung cancer, followed by Dr. Steffi Badillo. Tobacco Abuse, counseled cessation, 14 mg nicotine patch. Lung nodule, 4mm, Rads rec f/u imaging in 12 mos. Patient feels better today and wants to go home. Discharge on oral antibiotics- Clindamycin; Add Culturelle . plan will be for her to follow-up with ENT in 1 week and she will need an outpatient tonsillectomy. .     Patient not taking many of her home medications. She will need to discuss further with her PCP       Procedures (Please Review Full Report for Details)  N/A    Consults    N/A      Physical Exam at Discharge:    BP (!) 94/59   Pulse 62   Temp 97.9 °F (36.6 °C) (Oral)   Resp 16   Ht 5' 7\" (1.702 m)   Wt 168 lb 8 oz (76.4 kg)   SpO2 96%   BMI 26.39 kg/m²   General:  Awake, alert, NAD  Skin:  Warm and dry  Neck:  JVD absent. Neck supple  Chest:  Clear to auscultation, respiration easy. No wheezes, rales or rhonchi. Cardiovascular:  RRR ,S1S2 normal  Abdomen:  Soft, non tender, non distended, BS +  Extremities:  No edema. Intact peripheral pulses.  Brisk cap refill, < 2 secs  Neuro: non focal       Lab Results   Component Value Date    WBC 10.3 10/25/2022    HGB 14.1 10/25/2022    HCT 41.8 10/25/2022    MCV 88.9 10/25/2022     10/25/2022     Lab Results   Component Value Date     (L) 10/25/2022    K 3.9 10/25/2022    CL 94 (L) 10/25/2022    CO2 23 10/25/2022    BUN 19 10/25/2022    CREATININE 0.6 10/25/2022    GLUCOSE 422 (H) 10/25/2022    CALCIUM 9.5 10/25/2022    PROT 8.1 10/24/2022    LABALBU 4.0 10/24/2022    BILITOT 0.7 10/24/2022    ALKPHOS 125 10/24/2022    AST 17 10/24/2022    ALT 19 10/24/2022    LABGLOM >60 10/25/2022    GFRAA >60 10/22/2021    AGRATIO 1.0 (L) 10/24/2022    GLOB 3.0 10/22/2021       CULTURES  COVID not detected  Blood cultures pending   Strep negative     RADIOLOGY  CT SOFT TISSUE NECK WO CONTRAST   Final Result   Findings worrisome for right-sided tonsillitis versus tonsillar neoplasm. There is associated right level 2, and right level 3 adenopathy. It is   conceivable this could be reactive but given emphysematous changes, neoplasm   should be a strong consideration. Haziness identified right submandibular region could be related to cellulitis   versus sialoadenitis. Consider follow-up imaging and ENT consultation may be beneficial.      4 mm right solid pulmonary nodule within the upper lobe. A non-contrast Chest CT at 12 months is optional. If performed and the nodule   is stable at 12 months, no further follow-up is recommended.                  Discharge Medications     Medication List        START taking these medications      clindamycin 300 MG capsule  Commonly known as: CLEOCIN  Take 1 capsule by mouth 3 times daily for 10 days     lactobacillus capsule  Take 1 capsule by mouth 2 times daily            CONTINUE taking these medications      Accu-Chek Italia Plus w/Device Kit  Check qid     Accu-Chek Italia Soln  Check when you use a new box of strips     Accu-Chek Multiclix Lancets Misc  Check qid     albuterol sulfate  (90 Base) MCG/ACT inhaler  Commonly known as: PROVENTIL;VENTOLIN;PROAIR     blood glucose test strips strip  Commonly known as: Accu-Chek Italia Plus  Check qid     fluticasone 110 MCG/ACT inhaler  Commonly known as: FLOVENT HFA     insulin lispro 100 UNIT/ML pen  Commonly known as: HumaLOG KwikPen  Inject 25 Units into the skin 3 times daily (before meals)     Insulin Pen Needle 31G X 8 MM Misc  Commonly known as: B-D ULTRAFINE III SHORT PEN  1 each by Does not apply route daily     tiotropium 2.5 MCG/ACT Aers inhaler  Commonly known as: SPIRIVA RESPIMAT  Inhale 2 puffs into the lungs daily            STOP taking these medications      apixaban 5 MG Tabs tablet  Commonly known as: Eliquis DVT/PE Starter Pack     aspirin 81 MG tablet     atorvastatin 40 MG tablet  Commonly known as: LIPITOR     benzonatate 100 MG capsule  Commonly known as: TESSALON     clopidogrel 75 MG tablet  Commonly known as: PLAVIX     hydroCHLOROthiazide 12.5 MG tablet  Commonly known as: HYDRODIURIL     hydrOXYzine pamoate 25 MG capsule  Commonly known as: VISTARIL     insulin glargine 100 UNIT/ML injection pen  Commonly known as: Lantus SoloStar     lansoprazole 15 MG delayed release capsule  Commonly known as: PREVACID     levothyroxine 25 MCG tablet  Commonly known as: SYNTHROID     metoprolol succinate 25 MG extended release tablet  Commonly known as: TOPROL XL     nitroGLYCERIN 0.4 MG SL tablet  Commonly known as: NITROSTAT     OXYCODONE HCL (ABUSE DETER) PO               Where to Get Your Medications        These medications were sent to [address] [place] [address],[address]  [place] [address]      Phone: [phone]   clindamycin 300 MG capsule  lactobacillus capsule           Discharged in stable condition to home     Follow Up:   Follow up with PCP in 1 week and ENT      Mitch Mauro MD

## 2022-10-25 NOTE — PROGRESS NOTES
RT Inhaler-Nebulizer Bronchodilator Protocol Note    There is a bronchodilator order in the chart from a provider indicating to follow the RT Bronchodilator Protocol and there is an Initiate RT Inhaler-Nebulizer Bronchodilator Protocol order as well (see protocol at bottom of note). CXR Findings:  No results found. The findings from the last RT Protocol Assessment were as follows:   History Pulmonary Disease: Chronic pulmonary disease  Respiratory Pattern: Regular pattern and RR 12-20 bpm  Breath Sounds: Clear breath sounds  Cough: Strong, spontaneous, non-productive  Indication for Bronchodilator Therapy: None  Bronchodilator Assessment Score: 2    Aerosolized bronchodilator medication orders have been revised according to the RT Inhaler-Nebulizer Bronchodilator Protocol below. Respiratory Therapist to perform RT Therapy Protocol Assessment initially then follow the protocol. Repeat RT Therapy Protocol Assessment PRN for score 0-3 or on second treatment, BID, and PRN for scores above 3. No Indications - adjust the frequency to every 6 hours PRN wheezing or bronchospasm, if no treatments needed after 48 hours then discontinue using Per Protocol order mode. If indication present, adjust the RT bronchodilator orders based on the Bronchodilator Assessment Score as indicated below. Use Inhaler orders unless patient has one or more of the following: on home nebulizer, not able to hold breath for 10 seconds, is not alert and oriented, cannot activate and use MDI correctly, or respiratory rate 25 breaths per minute or more, then use the equivalent nebulizer order(s) with same Frequency and PRN reasons based on the score. If a patient is on this medication at home then do not decrease Frequency below that used at home.     0-3 - enter or revise RT bronchodilator order(s) to equivalent RT Bronchodilator order with Frequency of every 4 hours PRN for wheezing or increased work of breathing using Per Protocol order mode. 4-6 - enter or revise RT Bronchodilator order(s) to two equivalent RT bronchodilator orders with one order with BID Frequency and one order with Frequency of every 4 hours PRN wheezing or increased work of breathing using Per Protocol order mode. 7-10 - enter or revise RT Bronchodilator order(s) to two equivalent RT bronchodilator orders with one order with TID Frequency and one order with Frequency of every 4 hours PRN wheezing or increased work of breathing using Per Protocol order mode. 11-13 - enter or revise RT Bronchodilator order(s) to one equivalent RT bronchodilator order with QID Frequency and an Albuterol order with Frequency of every 4 hours PRN wheezing or increased work of breathing using Per Protocol order mode. Greater than 13 - enter or revise RT Bronchodilator order(s) to one equivalent RT bronchodilator order with every 4 hours Frequency and an Albuterol order with Frequency of every 2 hours PRN wheezing or increased work of breathing using Per Protocol order mode.        Electronically signed by Sweetie Pfeiffer RCP on 10/25/2022 at 3:09 AM

## 2022-10-26 ENCOUNTER — CARE COORDINATION (OUTPATIENT)
Dept: CASE MANAGEMENT | Age: 52
End: 2022-10-26

## 2022-10-26 LAB — S PYO THROAT QL CULT: NORMAL

## 2022-10-26 NOTE — CARE COORDINATION
[facility] Care Transitions Initial Follow Up Call    Call within 2 business days of discharge: Yes    Care Transition Nurse contacted the patient by telephone to perform post hospital discharge assessment. Verified name and  with patient as identifiers. Provided introduction to self, and explanation of the Care Transition Nurse role. Patient: Tara Ndiaye Patient : 1970   MRN: [id number]  Reason for Admission: Throat swelling, tonsillitis  Discharge Date: 10/25/22 RARS: Readmission Risk Score: 5.9      Last Discharge  [facility]       Date Complaint Diagnosis Description Type Department Provider    10/24/22 Oral Swelling Tonsillar mass . .. ED to Hosp-Admission (Discharged) (ADMITTED) [facility] 2 Yan Monk MD; Brooks Partida. .. Was this an external facility discharge? No Discharge Facility: [address] [place] [address] Road To [facility] to be reviewed by the provider         Pt was in the car with her son and asked to have a call back tomorrow when she would be home.          Follow Up  Future Appointments   Date Time Provider Marquise Jamison   2022  2:30 PM [facility] CT MAIN [facility] CT SC Cesilia Bhandari RN

## 2022-10-27 ENCOUNTER — CARE COORDINATION (OUTPATIENT)
Dept: CASE MANAGEMENT | Age: 52
End: 2022-10-27

## 2022-10-27 RX ORDER — INSULIN ASPART 100 [IU]/ML
INJECTION, SOLUTION INTRAVENOUS; SUBCUTANEOUS 3 TIMES DAILY
COMMUNITY

## 2022-10-27 NOTE — CARE COORDINATION
[facility] Care Transitions Initial Follow Up Call    Call within 2 business days of discharge: Yes    Patient: Sincere Cruz Patient : 1970   MRN: [id number]  Reason for Admission: throat swelling, right infectious tonsillitis vs pharyngeal neoplasm, hx lung cancer, n/v, DM2 uncontrolled (A1C 15.3%), CAD, HLD, HTN, hypothyroidism, chronic pain, tobacco abuse, lung nodule -> home no services, f/up Dr John Devries, f/up ENT one week  Discharge Date: 10/25/22 RARS: Readmission Risk Score: 5.9      Last Discharge  Street       Date Complaint Diagnosis Description Type Department Provider    10/24/22 Oral Swelling Tonsillar mass . .. ED to Hosp-Admission (Discharged) (ADMITTED) [facility] Prudy Osgood, MD; Brooks Partida. .. Was this an external facility discharge? No Discharge Facility: NA    Challenges to be reviewed by the provider   Additional needs identified to be addressed with provider: Yes  Discharge Summary securely faxed to PCP office for continuity of care. Method of communication with provider: phone. Care Transition Nurse contacted the patient by telephone to perform post hospital discharge assessment. Verified name and  with patient as identifiers. Provided introduction to self, and explanation of the Care Transition Nurse role. Reached Angella at home with her grandchildren running late to drop them at school. States she is tolerating the abx and pro biotic but doesn't really take any of her other medications other than Novolog insulin on sliding scale 15-35 units TID with meals. Does not take long acting insulin. Reviewed A1C 15.3% - she was very surprised. States baseline BS 130s until she started getting sick and her sugars have been increasing. This has been going on for an unknown amount time. She has discussed this with her PCP Betsey Flores and said her goal was to sit and go through the meds and talk about why she cannot tolerate them.  States she takes her medications and immediately vomits them up. Again states she is tolerating the abx and pro biotic. She is able to breathe, talk and swallow her saliva without difficulty but knows if unable that this is a 911 emergency. She has not scheduled a hospital follow up appointment but plans today. Denies assistance doing this. She will schedule with ENT. She already spoke with Arnaldo Vera at Dr [facility] office. Has appt Nov 18 (soonest available as provider is on vacation). She was offered sooner appt with NP at his office, but declined. She is getting ready to drive her grandchildren to school and says will make appointments when home after the drop off. Agreeable to CTN contacting PCP office to confirm they have discharge summary info for continuity of care. Stressed importance of ASAP f/up with PCP and within the week with ENT. She voices understanding. Denies referrals or assistance. Outreach to [employer] office of [facility] Caro and spoke with Rony Aviles. Expressed concern for patient's health with her A1C, medication management , inability to tolerate medications. Rony Aviles will send priority message to PCP Dr Phoebe Hernandez and office will initiate call to schedule an appointment. CTN securely faxed discharge summary for continuity of care. Care Transition Nurse reviewed discharge instructions, medical action plan, and red flags with patient who verbalized understanding. The patient was given an opportunity to ask questions and does not have any further questions or concerns at this time. Were discharge instructions available to patient? Yes. Reviewed appropriate site of care based on symptoms and resources available to patient including: PCP  Specialist. The patient agrees to contact the PCP office for questions related to their healthcare. Advance Care Planning:   Does patient have an Advance Directive: not on file. Medication reconciliation was performed with patient, who verbalizes understanding of administration of home medications. Medications reviewed, 1111F entered: yes    Was patient discharged with a pulse oximeter? no    Non-face-to-face services provided:  Obtained and reviewed discharge summary and/or continuity of care documents  Communication with specialists who will assume or re-assume care of the patient's system-specific problems-PCP  Education of patient/family/caregiver/guardian to support self-management-s/s that are 911 emergency; f/up plan; medication education; DM education  Assessment and support for treatment adherence and medication management-med review completed     Offered patient enrollment in the Remote Patient Monitoring (RPM) program for in-home monitoring: Patient is not eligible for RPM program.    Follow Up  Future Appointments   Date Time Provider Marquise Jamison   11/8/2022  2:30 PM [facility] Transition Nurse provided contact information. No further follow-up call indicated based on severity of symptoms and risk factors.     Kimberly Chery RN  Care Transition Nurse  [phone] mobile

## 2022-10-28 LAB
BLOOD CULTURE, ROUTINE: NORMAL
CULTURE, BLOOD 2: NORMAL

## 2022-11-01 ENCOUNTER — OFFICE VISIT (OUTPATIENT)
Dept: ENT CLINIC | Age: 52
End: 2022-11-01
Payer: MEDICARE

## 2022-11-01 VITALS — TEMPERATURE: 97.9 F | BODY MASS INDEX: 26.37 KG/M2 | HEIGHT: 67 IN | WEIGHT: 168 LBS

## 2022-11-01 DIAGNOSIS — R59.0 LAD (LYMPHADENOPATHY) OF RIGHT CERVICAL REGION: ICD-10-CM

## 2022-11-01 DIAGNOSIS — J35.8 ASYMMETRIC TONSILS: ICD-10-CM

## 2022-11-01 DIAGNOSIS — J02.9 PHARYNGITIS, UNSPECIFIED ETIOLOGY: Primary | ICD-10-CM

## 2022-11-01 PROCEDURE — 99204 OFFICE O/P NEW MOD 45 MIN: CPT | Performed by: OTOLARYNGOLOGY

## 2022-11-01 ASSESSMENT — ENCOUNTER SYMPTOMS
TROUBLE SWALLOWING: 1
SINUS PRESSURE: 1
WHEEZING: 0
FACIAL SWELLING: 0
SORE THROAT: 1
SHORTNESS OF BREATH: 0
VOICE CHANGE: 1
SINUS PAIN: 0
ABDOMINAL PAIN: 0

## 2022-11-01 NOTE — PROGRESS NOTES
Erica Harmon [address], [address], [address] [place] [address]  P: [phone]       Patient     Tanesha Lott  1970    Chief Concern     Chief Complaint   Patient presents with    New Patient     Patient is here today for tonsil issues. Patient states when she throws up it comes out of her nose. When she eats or drinks it comes out her nose. She states she has not been really eating or drinking in the past 3 weeks because of this. Patient states last night she forced herself to eat a sub sandwich, She stated it hurt eating it but since then she has been able to eat and drink. She has had issues with stuff getting stuck in her throat for the last 3 weeks. Assessment and Plan      Diagnosis Orders   1. Pharyngitis, unspecified etiology        2. LAD (lymphadenopathy) of right cervical region        3. Asymmetric tonsils          52-year-old female with history of lung cancer, in remission, significant smoker, with recent episode of pharyngitis-asymmetric tonsils at this visit, right tonsil tender, with tender cervical lymphadenopathy. Concern for malignancy remains high, we will see her back in 2 weeks and have asked her to use warm compresses over the right neck, continue antibiotics-may plan for direct laryngoscopy, possible tonsillectomy and/or fine-needle aspiration vs. core needle biopsy of the right neck cervical lymphadenopathy. No follow-ups on file. History of Present Illness     49 y.o. female with concern for throat pain that began 10/23, bilaterally however significantly worse on the right side - states she has had recurrent episodes of throat pain, ongoing \"as long as I can remember, 12years old\" - last episode occurred over the summer. Had significant odynophagia, along with right ear referred otalgia and nasal regurgitation of water.  Admitted to the hospital - CBC without elevation, CT imaging showed asymmetric tonsils, right > left, along with right sided cervical LAD that she describes as painful. States she has lost 40lbs in the past month due to odynophagia, however states she ate a sandwich yesterday with significant pain, then had significant improvement of odynophagia - had a \"buttered roll\" this morning with significant odynophagia but was able to get this down with small bites. Has history of pulmonary emphysema, non-small cell lung carcinoma diagnosed 11/2017, followed [facility] - - treated with chemoradiotherapy, has been in remission for 5 years. 37-pack-year tobacco use history, continues to smoke. Of note, had PET/CT 04/2022 with small pulmonary nodules noted but were too small to characterize on PET.      Past Medical History     Past Medical History:   Diagnosis Date    Anemia     Cancer ([address] 2016    non small cell lung cancer    Depression     Diabetes mellitus ([address]     Emphysema     Emphysema of lung ([address]     Hyperlipidemia     Hypoglycemia     Psychiatric problem     Seizures ([address]     Type 2 diabetes mellitus with hyperglycemia, without long-term current use of insulin ([address] 3/16/2019       Past Surgical History     Past Surgical History:   Procedure Laterality Date    BACK SURGERY      HYSTERECTOMY (CERVIX STATUS UNKNOWN)      LUNG BIOPSY Left 11/13/2017       Family History     Family History   Problem Relation Age of Onset    Stroke Mother     Migraines Mother     Stroke Father     High Cholesterol Father     Depression Sister     Migraines Sister        Social History     Social History     Tobacco Use    Smoking status: Every Day     Packs/day: 1.00     Years: 37.00     Pack years: 37.00     Types: Cigarettes    Smokeless tobacco: Never    Tobacco comments:     10/31/17 0.5ppd   Substance Use Topics    Alcohol use: Not Currently     Comment: rarely    Drug use: No        Allergies     Allergies   Allergen Reactions    Amoxicillin Anaphylaxis    Mushroom Extract Complex Anaphylaxis    Pcn [Penicillins] Other (See Comments)     Pt states that she was in a coma for 5 days    Coconut Oil     Iv Dye [Iodides]     Percocet [Oxycodone-Acetaminophen]      Tolerates Vicodin. Shellfish-Derived Products     Tramadol     Tylenol With Codeine [Acetaminophen-Codeine]      Pt states she can take codeine, and take tylenol-just cant take the combination  Tolerates Vicodin. Medications     Current Outpatient Medications   Medication Sig Dispense Refill    insulin aspart (NOVOLOG) 100 UNIT/ML injection vial Inject into the skin 3 times daily      clindamycin (CLEOCIN) 300 MG capsule Take 1 capsule by mouth 3 times daily for 10 days 30 capsule 0    lactobacillus (CULTURELLE) capsule Take 1 capsule by mouth 2 times daily 30 capsule 0    insulin lispro (HUMALOG KWIKPEN) 100 UNIT/ML pen Inject 25 Units into the skin 3 times daily (before meals) 7 pen 3    Insulin Pen Needle (B-D ULTRAFINE III SHORT PEN) 31G X 8 MM MISC 1 each by Does not apply route daily 100 each 3    blood glucose test strips (ACCU-CHEK KRISTINA PLUS) strip Check qid 150 each 3    Blood Glucose Monitoring Suppl (ACCU-CHEK KRISTINA PLUS) w/Device KIT Check qid 1 kit 0    Blood Glucose Calibration (ACCU-CHEK KRISTINA) SOLN Check when you use a new box of strips 1 each 0    ACCU-CHEK MULTICLIX LANCETS MISC Check qid 200 each 2    fluticasone (FLOVENT HFA) 110 MCG/ACT inhaler Inhale 1 puff into the lungs 2 times daily      albuterol sulfate  (90 Base) MCG/ACT inhaler Inhale 2 puffs into the lungs every 6 hours as needed for Wheezing      tiotropium (SPIRIVA RESPIMAT) 2.5 MCG/ACT AERS inhaler Inhale 2 puffs into the lungs daily 1 Inhaler 1     No current facility-administered medications for this visit. Review of Systems     Review of Systems   Constitutional:  Positive for activity change, appetite change and fatigue. Negative for chills, diaphoresis and fever. HENT:  Positive for ear pain, sinus pressure, sore throat, trouble swallowing and voice change.  Negative for congestion, ear discharge, facial swelling, hearing loss and sinus pain. Eyes:  Negative for visual disturbance. Respiratory:  Negative for shortness of breath and wheezing. Cardiovascular:  Negative for chest pain. Gastrointestinal:  Negative for abdominal pain. Musculoskeletal:  Positive for neck pain. Negative for neck stiffness. Skin:  Negative for rash. Neurological:  Negative for dizziness, light-headedness and headaches. Hematological:  Negative for adenopathy. PhysicalExam     Vitals:    11/01/22 1338   Temp: 97.9 °F (36.6 °C)   TempSrc: Infrared   Weight: 168 lb (76.2 kg)   Height: 5' 7\" (1.702 m)       Physical Exam  Vitals reviewed. Constitutional:       Appearance: Normal appearance. HENT:      Head: Normocephalic and atraumatic. Right Ear: Tympanic membrane, ear canal and external ear normal. There is impacted cerumen. Left Ear: Tympanic membrane, ear canal and external ear normal. There is no impacted cerumen. Ears:      Vegas exam findings: Does not lateralize. Right Rinne: AC > BC. Left Rinne: AC > BC. Nose: No congestion or rhinorrhea. Mouth/Throat:      Lips: Pink. No lesions. Mouth: Mucous membranes are moist. No oral lesions. Tongue: No lesions. Tongue does not deviate from midline. Palate: No mass and lesions. Pharynx: Oropharynx is clear. Uvula midline. No oropharyngeal exudate or posterior oropharyngeal erythema. Tonsils: 3+ on the right. 2+ on the left. Comments: Significant tenderness to palpation of the right tonsil  Eyes:      Extraocular Movements: Extraocular movements intact. Pupils: Pupils are equal, round, and reactive to light. Neck:      Comments: Tender lymphadenopathy in the jugulodigastric chain - mobile  Musculoskeletal:      Cervical back: Normal range of motion and neck supple. Lymphadenopathy:      Cervical: Cervical adenopathy present. Right cervical: Deep cervical adenopathy present.    Neurological: Mental Status: She is alert. Cranial Nerves: No cranial nerve deficit. Data Review      CT soft tissue neck neck reviewed, without contrast, but may have a small fluid collection within the right tonsil/in the peritonsillar space. Has cervical lymphadenopathy (2 well-defined lymph nodes) in the jugulodigastric chain, approximately 1.8 cm in craniocaudal dimension    PET/CT reviewed - no avid adenopathy noted, possible tonsillar/soft palate uptake - discussed with Dr. Lulú Gramajo (radiologist)    Procedure     None    Marianna Garrett MD  11/1/22    Portions of this note were dictated using Dragon.  There may be linguistic errors secondary to the use of this program.

## 2022-11-17 ENCOUNTER — HOSPITAL ENCOUNTER (OUTPATIENT)
Dept: CT IMAGING | Age: 52
Discharge: HOME | End: 2022-11-17
Payer: COMMERCIAL

## 2022-11-17 DIAGNOSIS — C34.12 SQUAMOUS CELL CARCINOMA OF BRONCHUS IN LEFT UPPER LOBE (HCC): ICD-10-CM

## 2022-11-17 PROCEDURE — 71250 CT THORAX DX C-: CPT

## 2022-11-22 ENCOUNTER — OFFICE VISIT (OUTPATIENT)
Dept: ENT CLINIC | Age: 52
End: 2022-11-22
Payer: MEDICARE

## 2022-11-22 VITALS — TEMPERATURE: 97.2 F | HEIGHT: 67 IN | WEIGHT: 152 LBS | BODY MASS INDEX: 23.86 KG/M2

## 2022-11-22 DIAGNOSIS — J35.8 TONSILLAR MASS: ICD-10-CM

## 2022-11-22 DIAGNOSIS — R59.0 CERVICAL LYMPHADENOPATHY: Primary | ICD-10-CM

## 2022-11-22 PROCEDURE — 99212 OFFICE O/P EST SF 10 MIN: CPT | Performed by: OTOLARYNGOLOGY

## 2022-11-22 ASSESSMENT — ENCOUNTER SYMPTOMS
SHORTNESS OF BREATH: 0
WHEEZING: 0
VOICE CHANGE: 1
SINUS PAIN: 0
FACIAL SWELLING: 0
SORE THROAT: 1
SINUS PRESSURE: 1
ABDOMINAL PAIN: 0
TROUBLE SWALLOWING: 1

## 2022-11-22 NOTE — PROGRESS NOTES
Erica Harmon [address], [address], [address] [place] [address]  P: [phone]       Patient     Alberto Hamm  1970    Chief Concern     Chief Complaint   Patient presents with    Follow-up     States her throat is only a little bit sore. States she feels like a bug in her RT ear or like hair is tickling her ear. Assessment and Plan      Diagnosis Orders   1. Cervical lymphadenopathy  IR LYMPH NODE BIOPSY RIGHT      2. Tonsillar mass          52-year-old female with history of lung cancer, in remission, significant smoker, with recent episode of pharyngitis-asymmetric tonsils at this visit, right tonsil firm, with tender cervical lymphadenopathy. Concern for malignancy high, we have ordered an urgent fine-needle aspiration biopsy/core needle biopsy of the right cervical lymphadenopathy. If this does not return positive for malignancy or is indeterminate, we will plan for direct laryngoscopy, possible tonsillectomy. No follow-ups on file. History of Present Illness     49 y.o. female with concern for throat pain that began 10/23, bilaterally however significantly worse on the right side - states she has had recurrent episodes of throat pain, ongoing \"as long as I can remember, 12years old\" - last episode occurred over the summer. Had significant odynophagia, along with right ear referred otalgia and nasal regurgitation of water. Admitted to the hospital - CBC without elevation, CT imaging showed asymmetric tonsils, right > left, along with right sided cervical LAD that she describes as painful. States she has lost 40lbs in the past month due to odynophagia, however states she ate a sandwich yesterday with significant pain, then had significant improvement of odynophagia - had a \"buttered roll\" this morning with significant odynophagia but was able to get this down with small bites.      Has history of pulmonary emphysema, non-small cell lung carcinoma diagnosed 11/2017, followed by [facility] - - treated with chemoradiotherapy, has been in remission for 5 years. 37-pack-year tobacco use history, continues to smoke. Of note, had PET/CT 04/2022 with small pulmonary nodules noted but were too small to characterize on PET. Interval history 11/22/2022: Patient states that she has improvement in right tonsillar pain, but has a \"tickle\" in the right ear. Continues to have weight loss. Past Medical History     Past Medical History:   Diagnosis Date    Anemia     Cancer ([address] 2016    non small cell lung cancer    Depression     Diabetes mellitus [facility]     Emphysema     Emphysema of lung ([facility]     Hyperlipidemia     Hypoglycemia     Psychiatric problem     Seizures [facility]     Type 2 diabetes mellitus with hyperglycemia, without long-term current use of insulin ([facility] 3/16/2019       Past Surgical History     Past Surgical History:   Procedure Laterality Date    BACK SURGERY      HYSTERECTOMY (CERVIX STATUS UNKNOWN)      LUNG BIOPSY Left 11/13/2017       Family History     Family History   Problem Relation Age of Onset    Stroke Mother     Migraines Mother     Stroke Father     High Cholesterol Father     Depression Sister     Migraines Sister        Social History     Social History     Tobacco Use    Smoking status: Every Day     Packs/day: 1.00     Years: 37.00     Pack years: 37.00     Types: Cigarettes    Smokeless tobacco: Never    Tobacco comments:     10/31/17 0.5ppd   Vaping Use    Vaping Use: Never used   Substance Use Topics    Alcohol use: Not Currently     Comment: rarely    Drug use: No        Allergies     Allergies   Allergen Reactions    Amoxicillin Anaphylaxis    Mushroom Extract Complex Anaphylaxis    Pcn [Penicillins] Other (See Comments)     Pt states that she was in a coma for 5 days    Coconut Oil     Iv Dye [Iodides]     Percocet [Oxycodone-Acetaminophen]      Tolerates Vicodin.     Shellfish-Derived Products     Tramadol     Tylenol With Codeine [Acetaminophen-Codeine]      Pt states she can take codeine, and take tylenol-just cant take the combination  Tolerates Vicodin. Medications     Current Outpatient Medications   Medication Sig Dispense Refill    insulin aspart (NOVOLOG) 100 UNIT/ML injection vial Inject into the skin 3 times daily      lactobacillus (CULTURELLE) capsule Take 1 capsule by mouth 2 times daily 30 capsule 0    insulin lispro (HUMALOG KWIKPEN) 100 UNIT/ML pen Inject 25 Units into the skin 3 times daily (before meals) 7 pen 3    Insulin Pen Needle (B-D ULTRAFINE III SHORT PEN) 31G X 8 MM MISC 1 each by Does not apply route daily 100 each 3    blood glucose test strips (ACCU-CHEK KRISTINA PLUS) strip Check qid 150 each 3    Blood Glucose Monitoring Suppl (ACCU-CHEK KRISTINA PLUS) w/Device KIT Check qid 1 kit 0    Blood Glucose Calibration (ACCU-CHEK KRISTINA) SOLN Check when you use a new box of strips 1 each 0    ACCU-CHEK MULTICLIX LANCETS MISC Check qid 200 each 2    fluticasone (FLOVENT HFA) 110 MCG/ACT inhaler Inhale 1 puff into the lungs 2 times daily      albuterol sulfate  (90 Base) MCG/ACT inhaler Inhale 2 puffs into the lungs every 6 hours as needed for Wheezing      tiotropium (SPIRIVA RESPIMAT) 2.5 MCG/ACT AERS inhaler Inhale 2 puffs into the lungs daily 1 Inhaler 1     No current facility-administered medications for this visit. Review of Systems     Review of Systems   Constitutional:  Positive for activity change, appetite change and fatigue. Negative for chills, diaphoresis and fever. HENT:  Positive for ear pain, sinus pressure, sore throat, trouble swallowing and voice change. Negative for congestion, ear discharge, facial swelling, hearing loss and sinus pain. Eyes:  Negative for visual disturbance. Respiratory:  Negative for shortness of breath and wheezing. Cardiovascular:  Negative for chest pain. Gastrointestinal:  Negative for abdominal pain. Musculoskeletal:  Positive for neck pain. Negative for neck stiffness.    Skin: Negative for rash. Neurological:  Negative for dizziness, light-headedness and headaches. Hematological:  Negative for adenopathy. PhysicalExam     Vitals:    11/22/22 1152   Temp: 97.2 °F (36.2 °C)   TempSrc: Infrared   Weight: 152 lb (68.9 kg)   Height: 5' 7\" (1.702 m)         Physical Exam  Vitals reviewed. Constitutional:       Appearance: Normal appearance. HENT:      Head: Normocephalic and atraumatic. Right Ear: Tympanic membrane, ear canal and external ear normal. There is no impacted cerumen. Left Ear: Tympanic membrane, ear canal and external ear normal. There is no impacted cerumen. Ears:      Vegas exam findings: Does not lateralize. Right Rinne: AC > BC. Left Rinne: AC > BC. Nose: No congestion or rhinorrhea. Mouth/Throat:      Lips: Pink. No lesions. Mouth: Mucous membranes are moist. No oral lesions. Tongue: No lesions. Tongue does not deviate from midline. Palate: No mass and lesions. Pharynx: Oropharynx is clear. Uvula midline. No oropharyngeal exudate or posterior oropharyngeal erythema. Tonsils: 3+ on the right. 2+ on the left. Comments: Significant firmness to palpation of the right tonsil  Eyes:      Extraocular Movements: Extraocular movements intact. Pupils: Pupils are equal, round, and reactive to light. Neck:      Comments: Tender lymphadenopathy in the right jugulodigastric chain - mobile  Musculoskeletal:      Cervical back: Normal range of motion and neck supple. Lymphadenopathy:      Cervical: Cervical adenopathy present. Right cervical: Deep cervical adenopathy present. Neurological:      Mental Status: She is alert. Cranial Nerves: No cranial nerve deficit. Data Review      CT soft tissue neck neck reviewed, without contrast, but may have a small fluid collection within the right tonsil/in the peritonsillar space.   Has cervical lymphadenopathy (2 well-defined lymph nodes) in the jugulodigastric chain, approximately 1.8 cm in craniocaudal dimension    PET/CT reviewed - no avid adenopathy noted, possible tonsillar/soft palate uptake - discussed with Dr. Asim Echavarria (radiologist)    Procedure     None    Billy Trejo MD  11/22/22    Portions of this note were dictated using Dragon.  There may be linguistic errors secondary to the use of this program.

## 2023-01-31 NOTE — PROGRESS NOTES
Cardio consult called at [phone] on 10/22/2021  ZAY aware  Armani [facility] Pt notified. Instructions given.

## 2023-02-10 ENCOUNTER — HOSPITAL ENCOUNTER (EMERGENCY)
Age: 53
Discharge: LEFT BEFORE DISCHARGE | End: 2023-02-10
Payer: MEDICARE

## 2023-02-10 VITALS
RESPIRATION RATE: 18 BRPM | TEMPERATURE: 98 F | HEART RATE: 82 BPM | WEIGHT: 170 LBS | HEIGHT: 67 IN | DIASTOLIC BLOOD PRESSURE: 79 MMHG | OXYGEN SATURATION: 94 % | SYSTOLIC BLOOD PRESSURE: 117 MMHG | BODY MASS INDEX: 26.68 KG/M2

## 2023-02-10 PROCEDURE — 93005 ELECTROCARDIOGRAM TRACING: CPT | Performed by: EMERGENCY MEDICINE

## 2023-02-10 ASSESSMENT — PAIN - FUNCTIONAL ASSESSMENT: PAIN_FUNCTIONAL_ASSESSMENT: 0-10

## 2023-02-10 ASSESSMENT — PAIN DESCRIPTION - LOCATION: LOCATION: CHEST

## 2023-02-11 LAB
EKG ATRIAL RATE: 82 BPM
EKG DIAGNOSIS: NORMAL
EKG P AXIS: 78 DEGREES
EKG P-R INTERVAL: 160 MS
EKG Q-T INTERVAL: 444 MS
EKG QRS DURATION: 94 MS
EKG QTC CALCULATION (BAZETT): 518 MS
EKG R AXIS: -78 DEGREES
EKG T AXIS: 74 DEGREES
EKG VENTRICULAR RATE: 82 BPM

## 2023-02-11 PROCEDURE — 93010 ELECTROCARDIOGRAM REPORT: CPT | Performed by: INTERNAL MEDICINE

## 2023-03-31 ENCOUNTER — HOSPITAL ENCOUNTER (OUTPATIENT)
Dept: CT IMAGING | Age: 53
Discharge: HOME | End: 2023-03-31
Payer: MEDICARE

## 2023-03-31 DIAGNOSIS — C34.12 SQUAMOUS CELL CARCINOMA OF BRONCHUS IN LEFT UPPER LOBE (HCC): ICD-10-CM

## 2023-03-31 PROCEDURE — 70490 CT SOFT TISSUE NECK W/O DYE: CPT

## 2023-03-31 PROCEDURE — 74176 CT ABD & PELVIS W/O CONTRAST: CPT

## 2023-04-04 ENCOUNTER — OFFICE VISIT (OUTPATIENT)
Dept: ENT CLINIC | Age: 53
End: 2023-04-04
Payer: MEDICARE

## 2023-04-04 VITALS
OXYGEN SATURATION: 96 % | BODY MASS INDEX: 26.68 KG/M2 | HEART RATE: 82 BPM | HEIGHT: 67 IN | TEMPERATURE: 98 F | WEIGHT: 170 LBS | DIASTOLIC BLOOD PRESSURE: 79 MMHG | SYSTOLIC BLOOD PRESSURE: 117 MMHG

## 2023-04-04 DIAGNOSIS — R59.0 LYMPHADENOPATHY, CERVICAL: ICD-10-CM

## 2023-04-04 DIAGNOSIS — J35.1 ENLARGED TONSILS: Primary | ICD-10-CM

## 2023-04-04 PROCEDURE — G8419 CALC BMI OUT NRM PARAM NOF/U: HCPCS | Performed by: OTOLARYNGOLOGY

## 2023-04-04 PROCEDURE — 99214 OFFICE O/P EST MOD 30 MIN: CPT | Performed by: OTOLARYNGOLOGY

## 2023-04-04 PROCEDURE — 4004F PT TOBACCO SCREEN RCVD TLK: CPT | Performed by: OTOLARYNGOLOGY

## 2023-04-04 PROCEDURE — G8427 DOCREV CUR MEDS BY ELIG CLIN: HCPCS | Performed by: OTOLARYNGOLOGY

## 2023-04-04 PROCEDURE — 3017F COLORECTAL CA SCREEN DOC REV: CPT | Performed by: OTOLARYNGOLOGY

## 2023-04-04 RX ORDER — OXYCODONE HYDROCHLORIDE 10 MG/1
TABLET ORAL
COMMUNITY
Start: 2023-03-15

## 2023-04-04 ASSESSMENT — ENCOUNTER SYMPTOMS
SHORTNESS OF BREATH: 0
ABDOMINAL PAIN: 0
SINUS PAIN: 0
FACIAL SWELLING: 0
SINUS PRESSURE: 1
TROUBLE SWALLOWING: 1
SORE THROAT: 1
WHEEZING: 0
VOICE CHANGE: 1

## 2023-04-04 NOTE — LETTER
[facility]  Surgery Schedule Request Form: 04/04/23                                                                    1 wk post op TBS  DATE OF SURGERY: 5/9/23  TIME OF SURGERY:  8:30 AM            CONF #: ____________________   Patient Information:  Patient name: Coleman Youssef    YOB: 1970 Age/Sex:52 y.o./female    SS #:[id number]    Wt Readings from Last 1 Encounters:   04/04/23 170 lb (77.1 kg)       Telephone Information:   Mobile [phone]     Home [phone]     Surgeon & Procedure Information:   Lead surgeon: Alina Barahona Co-Surgeon: ramos  Phone: [phone] Fax: [phone]  PCP: Laura Ramires DO    Diagnosis: Enlarged tonsils,  J35.1  cervical lymphadenopathy  R59.0    Location: [place]    Procedure name/CPT: DL microscope or telescope with biopsy 89988 and Tonsillectomy over 12 [id number], esophagoscopy 00386    Procedure length: 2 hours Anesthesia: General    Special Equipment: yes -direct laryngoscopy tray, esophagoscopy tray and associated endoscopes, airway tower    She will need both PCP and cardiology clearance need to be off anticoagulants for the procedure    Patient Status: SDS (OP)    COVID Vacs: yes / no     Primary Payor Plan: [facility] [employer]  Member ID: [id number]   Subscriber name: Coleman Gage allyao   NO    [] Implement attached clinical orders for patient.       Electronically signed by Radha Wood MD on 4/4/2023 at 4:49 PM

## 2023-04-04 NOTE — PROGRESS NOTES
taking: Reported on 4/4/2023)      lactobacillus (CULTURELLE) capsule Take 1 capsule by mouth 2 times daily (Patient not taking: Reported on 4/4/2023) 30 capsule 0    insulin lispro (HUMALOG KWIKPEN) 100 UNIT/ML pen Inject 25 Units into the skin 3 times daily (before meals) (Patient not taking: Reported on 4/4/2023) 7 pen 3    Insulin Pen Needle (B-D ULTRAFINE III SHORT PEN) 31G X 8 MM MISC 1 each by Does not apply route daily (Patient not taking: Reported on 4/4/2023) 100 each 3    blood glucose test strips (ACCU-CHEK KRISTINA PLUS) strip Check qid (Patient not taking: Reported on 4/4/2023) 150 each 3    Blood Glucose Monitoring Suppl (ACCU-CHEK KRISTINA PLUS) w/Device KIT Check qid (Patient not taking: Reported on 4/4/2023) 1 kit 0    Blood Glucose Calibration (ACCU-CHEK KRISTINA) SOLN Check when you use a new box of strips (Patient not taking: Reported on 4/4/2023) 1 each 0    ACCU-CHEK MULTICLIX LANCETS MISC Check qid (Patient not taking: Reported on 4/4/2023) 200 each 2    fluticasone (FLOVENT HFA) 110 MCG/ACT inhaler Inhale 1 puff into the lungs 2 times daily (Patient not taking: Reported on 4/4/2023)      albuterol sulfate  (90 Base) MCG/ACT inhaler Inhale 2 puffs into the lungs every 6 hours as needed for Wheezing (Patient not taking: Reported on 4/4/2023)      tiotropium (SPIRIVA RESPIMAT) 2.5 MCG/ACT AERS inhaler Inhale 2 puffs into the lungs daily 1 Inhaler 1     No current facility-administered medications for this visit. Review of Systems     Review of Systems   Constitutional:  Positive for activity change, appetite change and fatigue. Negative for chills, diaphoresis and fever. HENT:  Positive for ear pain, sinus pressure, sore throat, trouble swallowing and voice change. Negative for congestion, ear discharge, facial swelling, hearing loss and sinus pain. Eyes:  Negative for visual disturbance. Respiratory:  Negative for shortness of breath and wheezing.     Cardiovascular:  Negative for

## 2023-05-02 ENCOUNTER — ANESTHESIA EVENT (OUTPATIENT)
Dept: OPERATING ROOM | Age: 53
End: 2023-05-02
Payer: MEDICARE

## 2023-05-03 RX ORDER — ATORVASTATIN CALCIUM 40 MG/1
40 TABLET, FILM COATED ORAL DAILY
COMMUNITY

## 2023-05-03 RX ORDER — INSULIN GLARGINE 100 [IU]/ML
20 INJECTION, SOLUTION SUBCUTANEOUS NIGHTLY
COMMUNITY

## 2023-05-03 RX ORDER — APREMILAST 30 MG/1
1 TABLET, FILM COATED ORAL 2 TIMES DAILY
COMMUNITY

## 2023-05-03 RX ORDER — FLUTICASONE PROPIONATE 110 UG/1
1 AEROSOL, METERED RESPIRATORY (INHALATION) EVERY 4 HOURS PRN
COMMUNITY

## 2023-05-03 RX ORDER — AMIODARONE HYDROCHLORIDE 100 MG/1
100 TABLET ORAL DAILY
COMMUNITY

## 2023-05-03 RX ORDER — INSULIN ASPART 100 [IU]/ML
INJECTION, SOLUTION INTRAVENOUS; SUBCUTANEOUS
COMMUNITY

## 2023-05-03 RX ORDER — ASPIRIN 81 MG/1
81 TABLET ORAL DAILY
COMMUNITY

## 2023-05-03 RX ORDER — LEVALBUTEROL TARTRATE 45 UG/1
1-2 AEROSOL, METERED ORAL EVERY 4 HOURS PRN
COMMUNITY

## 2023-05-09 ENCOUNTER — HOSPITAL ENCOUNTER (OUTPATIENT)
Age: 53
Setting detail: OUTPATIENT SURGERY
Discharge: HOME | End: 2023-05-09
Attending: OTOLARYNGOLOGY | Admitting: OTOLARYNGOLOGY
Payer: MEDICARE

## 2023-05-09 ENCOUNTER — ANESTHESIA (OUTPATIENT)
Dept: OPERATING ROOM | Age: 53
End: 2023-05-09
Payer: MEDICARE

## 2023-05-09 VITALS
BODY MASS INDEX: 26.68 KG/M2 | HEART RATE: 70 BPM | HEIGHT: 67 IN | RESPIRATION RATE: 23 BRPM | OXYGEN SATURATION: 93 % | WEIGHT: 170 LBS | TEMPERATURE: 97.7 F | DIASTOLIC BLOOD PRESSURE: 98 MMHG | SYSTOLIC BLOOD PRESSURE: 141 MMHG

## 2023-05-09 DIAGNOSIS — R59.0 CERVICAL LYMPHADENOPATHY: ICD-10-CM

## 2023-05-09 DIAGNOSIS — J35.1 ENLARGED TONSILS: ICD-10-CM

## 2023-05-09 LAB
GLUCOSE BLD-MCNC: 251 MG/DL (ref 70–99)
GLUCOSE BLD-MCNC: 299 MG/DL (ref 70–99)
PERFORMED ON: ABNORMAL
PERFORMED ON: ABNORMAL

## 2023-05-09 PROCEDURE — A4217 STERILE WATER/SALINE, 500 ML: HCPCS | Performed by: OTOLARYNGOLOGY

## 2023-05-09 PROCEDURE — 7100000000 HC PACU RECOVERY - FIRST 15 MIN: Performed by: OTOLARYNGOLOGY

## 2023-05-09 PROCEDURE — 3700000001 HC ADD 15 MINUTES (ANESTHESIA): Performed by: OTOLARYNGOLOGY

## 2023-05-09 PROCEDURE — 2580000003 HC RX 258: Performed by: OTOLARYNGOLOGY

## 2023-05-09 PROCEDURE — 7100000011 HC PHASE II RECOVERY - ADDTL 15 MIN: Performed by: OTOLARYNGOLOGY

## 2023-05-09 PROCEDURE — 2500000003 HC RX 250 WO HCPCS: Performed by: ANESTHESIOLOGY

## 2023-05-09 PROCEDURE — 7100000001 HC PACU RECOVERY - ADDTL 15 MIN: Performed by: OTOLARYNGOLOGY

## 2023-05-09 PROCEDURE — 6360000002 HC RX W HCPCS: Performed by: NURSE ANESTHETIST, CERTIFIED REGISTERED

## 2023-05-09 PROCEDURE — 2580000003 HC RX 258: Performed by: NURSE ANESTHETIST, CERTIFIED REGISTERED

## 2023-05-09 PROCEDURE — 7100000010 HC PHASE II RECOVERY - FIRST 15 MIN: Performed by: OTOLARYNGOLOGY

## 2023-05-09 PROCEDURE — 2500000003 HC RX 250 WO HCPCS: Performed by: NURSE ANESTHETIST, CERTIFIED REGISTERED

## 2023-05-09 PROCEDURE — 3600000004 HC SURGERY LEVEL 4 BASE: Performed by: OTOLARYNGOLOGY

## 2023-05-09 PROCEDURE — 2709999900 HC NON-CHARGEABLE SUPPLY: Performed by: OTOLARYNGOLOGY

## 2023-05-09 PROCEDURE — 2580000003 HC RX 258: Performed by: ANESTHESIOLOGY

## 2023-05-09 PROCEDURE — 3700000000 HC ANESTHESIA ATTENDED CARE: Performed by: OTOLARYNGOLOGY

## 2023-05-09 PROCEDURE — 6370000000 HC RX 637 (ALT 250 FOR IP): Performed by: OTOLARYNGOLOGY

## 2023-05-09 PROCEDURE — 6360000002 HC RX W HCPCS: Performed by: OTOLARYNGOLOGY

## 2023-05-09 PROCEDURE — 88304 TISSUE EXAM BY PATHOLOGIST: CPT

## 2023-05-09 PROCEDURE — 88305 TISSUE EXAM BY PATHOLOGIST: CPT

## 2023-05-09 PROCEDURE — 3600000014 HC SURGERY LEVEL 4 ADDTL 15MIN: Performed by: OTOLARYNGOLOGY

## 2023-05-09 RX ORDER — DEXAMETHASONE SODIUM PHOSPHATE 4 MG/ML
INJECTION, SOLUTION INTRA-ARTICULAR; INTRALESIONAL; INTRAMUSCULAR; INTRAVENOUS; SOFT TISSUE PRN
Status: DISCONTINUED | OUTPATIENT
Start: 2023-05-09 | End: 2023-05-09 | Stop reason: SDUPTHER

## 2023-05-09 RX ORDER — SODIUM CHLORIDE 0.9 % (FLUSH) 0.9 %
5-40 SYRINGE (ML) INJECTION EVERY 12 HOURS SCHEDULED
Status: DISCONTINUED | OUTPATIENT
Start: 2023-05-09 | End: 2023-05-09 | Stop reason: HOSPADM

## 2023-05-09 RX ORDER — SODIUM CHLORIDE 0.9 % (FLUSH) 0.9 %
5-40 SYRINGE (ML) INJECTION PRN
Status: DISCONTINUED | OUTPATIENT
Start: 2023-05-09 | End: 2023-05-09 | Stop reason: HOSPADM

## 2023-05-09 RX ORDER — SODIUM CHLORIDE 9 MG/ML
INJECTION, SOLUTION INTRAVENOUS PRN
Status: DISCONTINUED | OUTPATIENT
Start: 2023-05-09 | End: 2023-05-09 | Stop reason: HOSPADM

## 2023-05-09 RX ORDER — ONDANSETRON 2 MG/ML
INJECTION INTRAMUSCULAR; INTRAVENOUS PRN
Status: DISCONTINUED | OUTPATIENT
Start: 2023-05-09 | End: 2023-05-09 | Stop reason: SDUPTHER

## 2023-05-09 RX ORDER — ONDANSETRON 2 MG/ML
4 INJECTION INTRAMUSCULAR; INTRAVENOUS
Status: DISCONTINUED | OUTPATIENT
Start: 2023-05-09 | End: 2023-05-09 | Stop reason: HOSPADM

## 2023-05-09 RX ORDER — PROPOFOL 10 MG/ML
INJECTION, EMULSION INTRAVENOUS PRN
Status: DISCONTINUED | OUTPATIENT
Start: 2023-05-09 | End: 2023-05-09 | Stop reason: SDUPTHER

## 2023-05-09 RX ORDER — SODIUM CHLORIDE, SODIUM LACTATE, POTASSIUM CHLORIDE, CALCIUM CHLORIDE 600; 310; 30; 20 MG/100ML; MG/100ML; MG/100ML; MG/100ML
INJECTION, SOLUTION INTRAVENOUS CONTINUOUS
Status: DISCONTINUED | OUTPATIENT
Start: 2023-05-09 | End: 2023-05-09 | Stop reason: HOSPADM

## 2023-05-09 RX ORDER — DIPHENHYDRAMINE HYDROCHLORIDE 50 MG/ML
12.5 INJECTION INTRAMUSCULAR; INTRAVENOUS
Status: DISCONTINUED | OUTPATIENT
Start: 2023-05-09 | End: 2023-05-09 | Stop reason: HOSPADM

## 2023-05-09 RX ORDER — FAMOTIDINE 10 MG/ML
20 INJECTION, SOLUTION INTRAVENOUS ONCE
Status: COMPLETED | OUTPATIENT
Start: 2023-05-09 | End: 2023-05-09

## 2023-05-09 RX ORDER — LIDOCAINE HYDROCHLORIDE 20 MG/ML
INJECTION, SOLUTION EPIDURAL; INFILTRATION; INTRACAUDAL; PERINEURAL PRN
Status: DISCONTINUED | OUTPATIENT
Start: 2023-05-09 | End: 2023-05-09 | Stop reason: SDUPTHER

## 2023-05-09 RX ORDER — OXYCODONE HYDROCHLORIDE AND ACETAMINOPHEN 5; 325 MG/1; MG/1
1 TABLET ORAL EVERY 6 HOURS PRN
Qty: 12 TABLET | Refills: 0 | Status: SHIPPED | OUTPATIENT
Start: 2023-05-09 | End: 2023-05-12

## 2023-05-09 RX ORDER — DEXMEDETOMIDINE HYDROCHLORIDE 100 UG/ML
INJECTION, SOLUTION INTRAVENOUS PRN
Status: DISCONTINUED | OUTPATIENT
Start: 2023-05-09 | End: 2023-05-09 | Stop reason: SDUPTHER

## 2023-05-09 RX ORDER — MEPERIDINE HYDROCHLORIDE 25 MG/ML
12.5 INJECTION INTRAMUSCULAR; INTRAVENOUS; SUBCUTANEOUS EVERY 5 MIN PRN
Status: DISCONTINUED | OUTPATIENT
Start: 2023-05-09 | End: 2023-05-09 | Stop reason: HOSPADM

## 2023-05-09 RX ORDER — FENTANYL CITRATE 50 UG/ML
INJECTION, SOLUTION INTRAMUSCULAR; INTRAVENOUS PRN
Status: DISCONTINUED | OUTPATIENT
Start: 2023-05-09 | End: 2023-05-09 | Stop reason: SDUPTHER

## 2023-05-09 RX ORDER — SODIUM CHLORIDE, SODIUM LACTATE, POTASSIUM CHLORIDE, CALCIUM CHLORIDE 600; 310; 30; 20 MG/100ML; MG/100ML; MG/100ML; MG/100ML
INJECTION, SOLUTION INTRAVENOUS CONTINUOUS PRN
Status: DISCONTINUED | OUTPATIENT
Start: 2023-05-09 | End: 2023-05-09 | Stop reason: SDUPTHER

## 2023-05-09 RX ORDER — MAGNESIUM HYDROXIDE 1200 MG/15ML
LIQUID ORAL CONTINUOUS PRN
Status: COMPLETED | OUTPATIENT
Start: 2023-05-09 | End: 2023-05-09

## 2023-05-09 RX ORDER — OXYMETAZOLINE HYDROCHLORIDE 0.05 G/100ML
SPRAY NASAL PRN
Status: DISCONTINUED | OUTPATIENT
Start: 2023-05-09 | End: 2023-05-09 | Stop reason: ALTCHOICE

## 2023-05-09 RX ORDER — ROCURONIUM BROMIDE 10 MG/ML
INJECTION, SOLUTION INTRAVENOUS PRN
Status: DISCONTINUED | OUTPATIENT
Start: 2023-05-09 | End: 2023-05-09 | Stop reason: SDUPTHER

## 2023-05-09 RX ORDER — LABETALOL HYDROCHLORIDE 5 MG/ML
5 INJECTION, SOLUTION INTRAVENOUS EVERY 10 MIN PRN
Status: DISCONTINUED | OUTPATIENT
Start: 2023-05-09 | End: 2023-05-09 | Stop reason: HOSPADM

## 2023-05-09 RX ORDER — ACETAMINOPHEN 160 MG/5ML
500 SUSPENSION, ORAL (FINAL DOSE FORM) ORAL EVERY 6 HOURS PRN
Qty: 355 ML | Refills: 3 | Status: SHIPPED | OUTPATIENT
Start: 2023-05-09 | End: 2023-05-19

## 2023-05-09 RX ADMIN — SODIUM CHLORIDE, POTASSIUM CHLORIDE, SODIUM LACTATE AND CALCIUM CHLORIDE: 600; 310; 30; 20 INJECTION, SOLUTION INTRAVENOUS at 07:48

## 2023-05-09 RX ADMIN — ONDANSETRON HYDROCHLORIDE 4 MG: 2 INJECTION, SOLUTION INTRAMUSCULAR; INTRAVENOUS at 08:57

## 2023-05-09 RX ADMIN — FENTANYL CITRATE 50 MCG: 50 INJECTION INTRAMUSCULAR; INTRAVENOUS at 08:44

## 2023-05-09 RX ADMIN — SODIUM CHLORIDE, POTASSIUM CHLORIDE, SODIUM LACTATE AND CALCIUM CHLORIDE: 600; 310; 30; 20 INJECTION, SOLUTION INTRAVENOUS at 08:40

## 2023-05-09 RX ADMIN — DEXMEDETOMIDINE HYDROCHLORIDE 20 MCG: 100 INJECTION, SOLUTION INTRAVENOUS at 08:44

## 2023-05-09 RX ADMIN — FENTANYL CITRATE 50 MCG: 50 INJECTION INTRAMUSCULAR; INTRAVENOUS at 09:04

## 2023-05-09 RX ADMIN — CEFAZOLIN 2000 MG: 2 INJECTION, POWDER, FOR SOLUTION INTRAMUSCULAR; INTRAVENOUS at 08:40

## 2023-05-09 RX ADMIN — PROPOFOL 150 MG: 10 INJECTION, EMULSION INTRAVENOUS at 08:44

## 2023-05-09 RX ADMIN — LIDOCAINE HYDROCHLORIDE 60 MG: 20 INJECTION, SOLUTION EPIDURAL; INFILTRATION; INTRACAUDAL; PERINEURAL at 08:44

## 2023-05-09 RX ADMIN — FAMOTIDINE 20 MG: 10 INJECTION, SOLUTION INTRAVENOUS at 07:48

## 2023-05-09 RX ADMIN — FENTANYL CITRATE 50 MCG: 50 INJECTION INTRAMUSCULAR; INTRAVENOUS at 09:49

## 2023-05-09 RX ADMIN — ROCURONIUM BROMIDE 50 MG: 10 INJECTION, SOLUTION INTRAVENOUS at 08:44

## 2023-05-09 RX ADMIN — SUGAMMADEX 200 MG: 100 INJECTION, SOLUTION INTRAVENOUS at 10:02

## 2023-05-09 RX ADMIN — FENTANYL CITRATE 50 MCG: 50 INJECTION INTRAMUSCULAR; INTRAVENOUS at 10:08

## 2023-05-09 RX ADMIN — DEXAMETHASONE SODIUM PHOSPHATE 8 MG: 4 INJECTION, SOLUTION INTRAMUSCULAR; INTRAVENOUS at 08:57

## 2023-05-09 ASSESSMENT — PAIN SCALES - GENERAL: PAINLEVEL_OUTOF10: 2

## 2023-05-09 ASSESSMENT — PAIN DESCRIPTION - DESCRIPTORS
DESCRIPTORS: DISCOMFORT
DESCRIPTORS: ACHING

## 2023-05-09 ASSESSMENT — PAIN DESCRIPTION - PAIN TYPE: TYPE: SURGICAL PAIN

## 2023-05-09 ASSESSMENT — PAIN - FUNCTIONAL ASSESSMENT
PAIN_FUNCTIONAL_ASSESSMENT: PREVENTS OR INTERFERES SOME ACTIVE ACTIVITIES AND ADLS
PAIN_FUNCTIONAL_ASSESSMENT: 0-10

## 2023-05-09 ASSESSMENT — ENCOUNTER SYMPTOMS: SHORTNESS OF BREATH: 1

## 2023-05-09 ASSESSMENT — PAIN DESCRIPTION - LOCATION: LOCATION: THROAT

## 2023-05-09 NOTE — PROGRESS NOTES
Patient discharged via wheelchair in stable condition with all belongings to private car. Alecia Mitchell RN

## 2023-05-09 NOTE — ANESTHESIA POSTPROCEDURE EVALUATION
Component                Value               Date/Time                  WBC                      10.3                10/25/2022 05:51 AM        HGB                      14.1                10/25/2022 05:51 AM        HCT                      41.8                10/25/2022 05:51 AM        PLT                      178                 10/25/2022 05:51 AM   RENAL  Lab Results       Component                Value               Date/Time                  NA                       132 (L)             10/25/2022 05:50 AM        K                        3.9                 10/25/2022 05:50 AM        CL                       94 (L)              10/25/2022 05:50 AM        CO2                      23                  10/25/2022 05:50 AM        BUN                      19                  10/25/2022 05:50 AM        CREATININE               0.6                 10/25/2022 05:50 AM        GLUCOSE                  422 (H)             10/25/2022 05:50 AM        GLUCOSE                  192 (H)             08/21/2017 01:15 PM   COAGS  Lab Results       Component                Value               Date/Time                  PROTIME                  13.8                10/25/2022 01:14 AM        INR                      1.08                10/25/2022 01:14 AM        APTT                     39.4 (H)            10/22/2021 12:54 AM     Intake & Output:  @12NVTL@    Nausea & Vomiting:  No    Level of Consciousness:  Awake    Pain Assessment:  Adequate analgesia    Anesthesia Complications:  No apparent anesthetic complications    SUMMARY      Vital signs stable  OK to discharge from Stage I post anesthesia care.   Care transferred from Anesthesiology department on discharge from perioperative area

## 2023-05-09 NOTE — PROGRESS NOTES
Patient admitted from OR to PACU. Bedside report received. Patient immediately  hooked up to heart monitor. Mikala Burns RN

## 2023-05-09 NOTE — ANESTHESIA PRE PROCEDURE
there were no regional wall motion abnormalities. Normal     diastolic function.   - Atrial septum: Agitated saline contrast study showed no     right-to-left atrial level shunt. - Inferior vena cava: The vessel was normal in size; the     respirophasic diameter changes were in the normal range (= 50%);     findings are consistent with normal central venous pressure. Pre-Operative Diagnosis: Enlarged tonsils [J35.1]; Cervical lymphadenopathy [R59.0]    47 y.o.   BMI:  Body mass index is 26.63 kg/m². Vitals:    05/03/23 1457 05/09/23 0708   BP:  117/78   Pulse:  84   Resp:  16   Temp:  98.2 °F (36.8 °C)   TempSrc:  Temporal   SpO2:  100%   Weight: 170 lb (77.1 kg) 170 lb (77.1 kg)   Height: 5' 7\" (1.702 m) 5' 7\" (1.702 m)       Allergies   Allergen Reactions    Amoxicillin Anaphylaxis    Mushroom Extract Complex Anaphylaxis    Pcn [Penicillins] Other (See Comments)     Pt states that she was in a coma for 5 days    Coconut Oil     Iv Dye [Iodides]     Percocet [Oxycodone-Acetaminophen]      Tolerates Vicodin.  Shellfish-Derived Products     Tramadol     Tylenol With Codeine [Acetaminophen-Codeine]      Pt states she can take codeine, and take tylenol-just cant take the combination  Tolerates Vicodin.        Social History     Tobacco Use    Smoking status: Every Day     Packs/day: 0.25     Years: 37.00     Pack years: 9.25     Types: Cigarettes    Smokeless tobacco: Never    Tobacco comments:     10/31/17 0.5ppd   Substance Use Topics    Alcohol use: Not Currently     Comment: rarely       LABS:    CBC  Lab Results   Component Value Date/Time    WBC 10.3 10/25/2022 05:51 AM    HGB 14.1 10/25/2022 05:51 AM    HCT 41.8 10/25/2022 05:51 AM     10/25/2022 05:51 AM     RENAL  Lab Results   Component Value Date/Time     (L) 10/25/2022 05:50 AM    K 3.9 10/25/2022 05:50 AM    CL 94 (L) 10/25/2022 05:50 AM    CO2 23 10/25/2022 05:50 AM    BUN 19 10/25/2022 05:50 AM    CREATININE 0.6

## 2023-05-09 NOTE — H&P
Erica Harmon [address], [address], [facility] Gregoria Padilla  P: [phone]        Patient      [facility]  1970     Chief Concern           Chief Complaint   Patient presents with    Follow-up       Patient is here today for a follow up of cervical lymphadenopathy. Patient states she is still having pain enough to where she can not eat occasionally. She states most days she don't notice it all and some days she can;t swallow water. Assessment and Plan        Diagnosis Orders   1. Enlarged tonsils          2. Lymphadenopathy, cervical             26-year-old female with history of lung cancer, in remission, significant smoker, with recent episode of pharyngitis-asymmetric tonsils at this visit, right tonsil firm, with tender cervical lymphadenopathy. Concern for malignancy high, we have ordered an urgent fine-needle aspiration biopsy/core needle biopsy of the right cervical lymphadenopathy. We discussed it further today, and she is more in favor of directed biopsies and tonsillectomy-we will plan for direct laryngoscopy, biopsies, bilateral tonsillectomy. She is a current smoker, has uncontrolled diabetes, and ischemic cardiomyopathy with CAD chronic systolic CHF -she will need both cardiology and primary care clearance for surgery.       -Given the above, the patient is a candidate for direct laryngoscopy with biopsy, esophagoscopy, possible microscope or operating telescope use; also a candidate for tonsillectomy   -Risks and benefits of tonsillectomy include pain, dysphagia, inflammation, infection (removal of the tonsils and/or adenoids does not prevent pharyngitis), hemorrhage requiring operative management, dysgeusia/decreased sensation to the ipsilateral tongue.   -Risks and benefits of the laryngoscopy portion of the procedure include damage to the lips, teeth, oral cavity or oropharynx, hoarseness, dysphagia/odynophagia, hemorrhage, voice changes or the need for further

## 2023-05-09 NOTE — PROGRESS NOTES
Discharge instructions given to patient and patients son. Both deny any questions at this time. Clemencia Haney RN

## 2023-05-09 NOTE — OP NOTE
Patient Name: Dilia Nevarez  YOB: 1970  Medical Record Number:  [id number]  Billing Number:  [id number]  Date of Procedure: 5/9/2023  Time: 0845    Pre Operative Diagnoses: Tonsillar hypertrophy, right cervical lymphadenopathy  Post Operative Diagnoses:  Right tonsillar mass, left true vocal cord mass           Procedure:    1. Direct suspension laryngoscopy with use of operating telescope and biopsy (06905)  2. Rigid esophagoscopy (73444)  3. Bilateral tonsillectomy (81446)           Surgeon: Shelia Torres MD  Assistant: Scrub Person First: Srinivasa Garduno; Sabra Gonzales  Anesthesia:  General endotracheal anesthesia. 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, significantly greater on the left with a small area of heaped-up epithelium - this was biopsied for permanent histopathology  6) The subglottis was without masses  7) The esophagus from introitus distally was without masses  8) The right tonsil was significantly firm to palpation - the tonsillar capsule was not easily dissected from the plane of the inferior constrictor, and we opted to leave a small cuff of tonsillar tissue to avoid injury to lateral structures  9) The left tonsil appeared soft to palpation, and the capsule easily identified during removal     Indications:  Brenton Mares is a now 47 y.o. female with a history of pulmonary malignancy, seen for right cervical lymphadenopathy; not PET avid, however she has been unable to follow up for fine needle aspiration biopsy.  We discussed evaluation of the airway and esophagus with biopsies and possible

## 2023-05-09 NOTE — DISCHARGE INSTRUCTIONS
Discharge Instructions for Tonsillectomy    Steps to [facility]   While your tonsils are healing: You may have bloody discharge from your mouth on occasion. Avoid air pollutants like dust and smoke. Physical Activity   During your recovery:   Avoid swimming in oceans and lakes for 2 weeks. Get plenty of rest for at least a 2-3 days. If your job involves heavy lifting, you may need to stay out of work up to 1 week. Ask your doctor when you will be able to return to work. Do not drive unless your doctor has given you permission to do so. Medications   Your doctor may advise:   Over-the-counter pain medication. Follow these general medication guidelines:   Take your medication as directed. Do not change the amount or schedule. Tylenol  500mg and Ibuprofen 600mg liquid. Alternate these two medications every three hours. Vicodin, 5 mg tablets. Take one tablet every 6 hours as needed for pain. Crush in pudding to swallow  Ask what side effects could occur. Report them to your doctor. Talk to your doctor before you stop taking the medication. Do not share your medication with anyone. Medications can be dangerous when mixed. Talk to your doctor if you are taking more than one medication, including over-the-counter products and supplements. If you had to stop medications before surgery, ask your doctor when you can start again. Follow-up  You will need to be seen by your doctor 3 weeks after surgery. If you had splints put in your nose during surgery, they will be taken out at your follow-up visit. It is important to go to any recommended appointments.     Call Your Doctor If Any of the Following Occur ([phone] or [phone])  Contact your doctor if your recovery is not progressing as expected or you develop complications, such as:  Signs of infection, including fever and chills  Pain that you cannot control with the medications that you have been given  Excessive bleeding from the

## 2023-05-12 ENCOUNTER — TELEPHONE (OUTPATIENT)
Dept: ENT CLINIC | Age: 53
End: 2023-05-12

## 2023-05-12 NOTE — TELEPHONE ENCOUNTER
----- Message from Timothy Almanza MD sent at 5/11/2023  5:12 PM EDT -----  Please notify Ms. Arciniega that the biopsies of her vocal cord and the tonsils came back negative for cancer.   ----- Message -----  From: Kang Calles Incoming Lab Results From [facility] (Benny Reza)  Sent: 5/10/2023  12:03 PM EDT  To: Timothy Almanza MD

## 2023-09-01 ENCOUNTER — HOSPITAL ENCOUNTER (OUTPATIENT)
Dept: CT IMAGING | Age: 53
Discharge: HOME | End: 2023-09-01
Attending: INTERNAL MEDICINE
Payer: MEDICARE

## 2023-09-01 DIAGNOSIS — C34.12 SQUAMOUS CELL CARCINOMA OF BRONCHUS IN LEFT UPPER LOBE (HCC): ICD-10-CM

## 2023-09-01 PROCEDURE — 70490 CT SOFT TISSUE NECK W/O DYE: CPT

## 2023-09-01 PROCEDURE — 74176 CT ABD & PELVIS W/O CONTRAST: CPT

## 2024-04-18 ENCOUNTER — APPOINTMENT (OUTPATIENT)
Dept: GENERAL RADIOLOGY | Age: 54
End: 2024-04-18
Payer: MEDICARE

## 2024-04-18 ENCOUNTER — HOSPITAL ENCOUNTER (EMERGENCY)
Age: 54
Discharge: HOME | End: 2024-04-18
Attending: STUDENT IN AN ORGANIZED HEALTH CARE EDUCATION/TRAINING PROGRAM
Payer: MEDICARE

## 2024-04-18 ENCOUNTER — HOSPITAL ENCOUNTER (OUTPATIENT)
Dept: CT IMAGING | Age: 54
Discharge: HOME | End: 2024-04-18
Attending: INTERNAL MEDICINE
Payer: MEDICARE

## 2024-04-18 VITALS
TEMPERATURE: 97.2 F | HEIGHT: 67 IN | OXYGEN SATURATION: 95 % | WEIGHT: 134 LBS | HEART RATE: 96 BPM | BODY MASS INDEX: 21.03 KG/M2 | SYSTOLIC BLOOD PRESSURE: 126 MMHG | DIASTOLIC BLOOD PRESSURE: 86 MMHG | RESPIRATION RATE: 16 BRPM

## 2024-04-18 DIAGNOSIS — C34.12 SQUAMOUS CELL CARCINOMA OF BRONCHUS IN LEFT UPPER LOBE (HCC): ICD-10-CM

## 2024-04-18 DIAGNOSIS — M75.22 BICEPS TENDONITIS ON LEFT: ICD-10-CM

## 2024-04-18 DIAGNOSIS — M79.602 LEFT ARM PAIN: Primary | ICD-10-CM

## 2024-04-18 PROCEDURE — 99284 EMERGENCY DEPT VISIT MOD MDM: CPT

## 2024-04-18 PROCEDURE — 70490 CT SOFT TISSUE NECK W/O DYE: CPT

## 2024-04-18 PROCEDURE — 73030 X-RAY EXAM OF SHOULDER: CPT

## 2024-04-18 PROCEDURE — 74176 CT ABD & PELVIS W/O CONTRAST: CPT

## 2024-04-18 PROCEDURE — 96372 THER/PROPH/DIAG INJ SC/IM: CPT

## 2024-04-18 PROCEDURE — 71045 X-RAY EXAM CHEST 1 VIEW: CPT

## 2024-04-18 PROCEDURE — 6360000002 HC RX W HCPCS: Performed by: STUDENT IN AN ORGANIZED HEALTH CARE EDUCATION/TRAINING PROGRAM

## 2024-04-18 RX ORDER — IBUPROFEN 800 MG/1
800 TABLET ORAL EVERY 8 HOURS PRN
Qty: 30 TABLET | Refills: 0 | Status: SHIPPED | OUTPATIENT
Start: 2024-04-18

## 2024-04-18 RX ORDER — KETOROLAC TROMETHAMINE 15 MG/ML
15 INJECTION, SOLUTION INTRAMUSCULAR; INTRAVENOUS ONCE
Status: COMPLETED | OUTPATIENT
Start: 2024-04-18 | End: 2024-04-18

## 2024-04-18 RX ADMIN — KETOROLAC TROMETHAMINE 15 MG: 15 INJECTION, SOLUTION INTRAMUSCULAR; INTRAVENOUS at 09:24

## 2024-04-18 SDOH — ECONOMIC STABILITY: FOOD INSECURITY: WITHIN THE PAST 12 MONTHS, THE FOOD YOU BOUGHT JUST DIDN'T LAST AND YOU DIDN'T HAVE MONEY TO GET MORE.: NEVER TRUE

## 2024-04-18 SDOH — ECONOMIC STABILITY: INCOME INSECURITY: IN THE LAST 12 MONTHS, WAS THERE A TIME WHEN YOU WERE NOT ABLE TO PAY THE MORTGAGE OR RENT ON TIME?: NO

## 2024-04-18 SDOH — ECONOMIC STABILITY: FOOD INSECURITY: WITHIN THE PAST 12 MONTHS, YOU WORRIED THAT YOUR FOOD WOULD RUN OUT BEFORE YOU GOT MONEY TO BUY MORE.: NEVER TRUE

## 2024-04-18 SDOH — ECONOMIC STABILITY: HOUSING INSECURITY
IN THE LAST 12 MONTHS, WAS THERE A TIME WHEN YOU DID NOT HAVE A STEADY PLACE TO SLEEP OR SLEPT IN A SHELTER (INCLUDING NOW)?: NO

## 2024-04-18 SDOH — ECONOMIC STABILITY: TRANSPORTATION INSECURITY
IN THE PAST 12 MONTHS, HAS THE LACK OF TRANSPORTATION KEPT YOU FROM MEDICAL APPOINTMENTS OR FROM GETTING MEDICATIONS?: NO

## 2024-04-18 SDOH — ECONOMIC STABILITY: HOUSING INSECURITY: IN THE LAST 12 MONTHS, HOW MANY PLACES HAVE YOU LIVED?: 1

## 2024-04-18 SDOH — ECONOMIC STABILITY: TRANSPORTATION INSECURITY
IN THE PAST 12 MONTHS, HAS LACK OF TRANSPORTATION KEPT YOU FROM MEETINGS, WORK, OR FROM GETTING THINGS NEEDED FOR DAILY LIVING?: NO

## 2024-04-18 ASSESSMENT — SOCIAL DETERMINANTS OF HEALTH (SDOH): HOW HARD IS IT FOR YOU TO PAY FOR THE VERY BASICS LIKE FOOD, HOUSING, MEDICAL CARE, AND HEATING?: NOT HARD AT ALL

## 2024-04-18 ASSESSMENT — PAIN SCALES - GENERAL
PAINLEVEL_OUTOF10: 0
PAINLEVEL_OUTOF10: 1

## 2024-04-18 ASSESSMENT — PAIN DESCRIPTION - DESCRIPTORS: DESCRIPTORS: ACHING

## 2024-04-18 ASSESSMENT — PAIN DESCRIPTION - LOCATION: LOCATION: ARM

## 2024-04-18 ASSESSMENT — LIFESTYLE VARIABLES
HOW MANY STANDARD DRINKS CONTAINING ALCOHOL DO YOU HAVE ON A TYPICAL DAY: PATIENT DOES NOT DRINK
HOW OFTEN DO YOU HAVE A DRINK CONTAINING ALCOHOL: NEVER

## 2024-04-18 ASSESSMENT — PAIN DESCRIPTION - ORIENTATION: ORIENTATION: LEFT

## 2024-04-18 NOTE — DISCHARGE INSTRUCTIONS
You were evaluated in the emergency department for left shoulder injury. Assessments and testing completed during your visit were reassuring and at this time there is no indication for further testing, treatment or admission to the hospital. Given this it is appropriate to discharge you from the emergency department. At the time of discharge we discussed the following:    As we discussed I believe you have inflammation or injury of the biceps tendon or rotator cuff of the left shoulder.  Please continue anti-inflammatories at home and you may use the sling for comfort but in the long-term you would benefit most from visit with sports medicine possibly to start physical therapy to support your recovery.    Please note that sometimes it is difficult to diagnose a medical condition early in the disease process before the disease is fully manifest. Because of this, should you develop any new or worsening symptoms, you may return at any time to the emergency department for another evaluation. If available you are also recommended to review this visit with your primary care physician or other medical provider in the next 7 days. Thank you for allowing us to care for you today.

## 2024-04-18 NOTE — ED PROVIDER NOTES
ULTRASOUND:  na    RECENT VITALS:  BP: 122/77, Temp: 97 °F (36.1 °C), Pulse: 96,Respirations: 16, SpO2: 95 %     Procedures     na    ED Course and MDM     Angella Arciniega is a 53 y.o. female who presents with acute on chronic left shoulder and upper arm pain.  Here she is afebrile hemodynamically stable in no acute distress.  Physical examination is overall reassuring.  Have no concern for a high risk condition particularly a neurovascular condition of the upper extremity.  I have low concern for trauma though will assess plain film x-ray imaging to better characterize the architecture of the shoulder.  Clinically I believe she is suffering a bicep tendinitis or similar process perhaps some inflammation or injury to the rotator cuff as well.  I have low concern for a complete rupture.  We discussed this at bedside we will assess x-ray imaging provide anti-inflammatories and a sling otherwise appropriate for discharge outpatient follow-up to sports medicine orthopedics per referral.    ED Course as of 04/18/24 1034   Thu Apr 18, 2024   1033 Patient reassessed clinical condition stable to improved over observation here.  X-ray imaging is negative.  At this time no indication for further testing or treatment in the emerged department appropriate for discharge outpatient follow-up to orthopedics. [NG]      ED Course User Index  [NG] Rommel Phillip MD       Additional Reassessment    Is this patient to be included in the SEP-1 core measure? No Exclusion criteria - the patient is NOT to be included for SEP-1 Core Measure due to: Infection is not suspected    Medical Decision Making  Presentation for acute on chronic left shoulder pain likely presents a biceps tendinitis no high risk features appropriate for conservative measures anti-inflammatories sling outpatient follow-up to orthopedics/sports medicine    Problems Addressed:  Biceps tendonitis on left: acute illness or injury  Left arm pain: acute illness or

## 2024-04-24 ENCOUNTER — OFFICE VISIT (OUTPATIENT)
Dept: ORTHOPEDIC SURGERY | Age: 54
End: 2024-04-24
Payer: MEDICARE

## 2024-04-24 VITALS — RESPIRATION RATE: 18 BRPM | BODY MASS INDEX: 25.11 KG/M2 | HEIGHT: 67 IN | WEIGHT: 160 LBS

## 2024-04-24 DIAGNOSIS — M25.512 ACUTE PAIN OF LEFT SHOULDER: Primary | ICD-10-CM

## 2024-04-24 DIAGNOSIS — M54.12 CERVICAL RADICULOPATHY: ICD-10-CM

## 2024-04-24 PROCEDURE — 99204 OFFICE O/P NEW MOD 45 MIN: CPT | Performed by: STUDENT IN AN ORGANIZED HEALTH CARE EDUCATION/TRAINING PROGRAM

## 2024-04-24 RX ORDER — CYCLOBENZAPRINE HCL 10 MG
10 TABLET ORAL NIGHTLY PRN
Qty: 14 TABLET | Refills: 0 | Status: SHIPPED | OUTPATIENT
Start: 2024-04-24 | End: 2024-05-08

## 2024-04-24 RX ORDER — METHYLPREDNISOLONE 4 MG/1
TABLET ORAL
Qty: 1 KIT | Refills: 0 | Status: SHIPPED | OUTPATIENT
Start: 2024-04-24 | End: 2024-04-30

## 2024-04-24 NOTE — PROGRESS NOTES
CHIEF COMPLAINT: Left shoulder pain    DATE OF ONSET: Late March 2024    History:    Angella Arciniega is a 53 y.o. left handed female referred by Rommel Phillip MD for evaluation and treatment of Left shoulder pain. This is evaluated as a personal injury. The pain began 1 month ago. Patient states she woke up with the pain in late March. She was lifting boxers and helping her sister move the day before the pain started. Pain is rated as a 7/10.   There was not a specific injury.  Patient was evaluated at the [facility] ED on 4/18/24 where she was provided with a prescription for Ibuprofen and given a left arm sling, and instructed to follow up with our office, per pt records. Patient states the pain originally started in his anterior arm/biceps region. It has progressed and it now  located across the left shoulder with radiation into the left neck, shoulder blade, and down the right arm. It is worse with any movement of the left arm. Patient also endorses numbness and tingling consistent with C5/C6 distribution. Patient notes extremely limited mobility and ROM secondary to pain/   The patient has not had PT. The patient has not had an injection. The patient has tried NSAIDs which has not alleviated her pain. The patient has tried ice and heat both which do not alleviate her pain. Patient has previously been on Gabapentin but has hallucinations as an adverse reaction. Patient's occupation is as a retired . Patient notes has a history or prior stroke and has been in remission from ovarian cancer for 6 yrs.     Outside reports reviewed:  ER records and historical medical records.    Past Medical History:   Diagnosis Date    Anemia     CAD (coronary artery disease)     Cancer (HCC) 2016    non small cell lung cancer    Cerebral artery occlusion with cerebral infarction (HCC)     CHF (congestive heart failure) (HCC)     Depression     Diabetes mellitus (HCC)     Emphysema     Emphysema of lung (HCC)

## 2024-07-09 NOTE — ED NOTES
[email or web address]
normal/ROM intact/normal gait/strength 5/5 bilateral upper extremities/strength 5/5 bilateral lower extremities

## 2024-08-05 ENCOUNTER — APPOINTMENT (OUTPATIENT)
Dept: CT IMAGING | Age: 54
End: 2024-08-05
Payer: MEDICARE

## 2024-08-05 ENCOUNTER — HOSPITAL ENCOUNTER (EMERGENCY)
Age: 54
Discharge: HOME | End: 2024-08-05
Payer: MEDICARE

## 2024-08-05 VITALS
RESPIRATION RATE: 19 BRPM | OXYGEN SATURATION: 96 % | HEART RATE: 72 BPM | DIASTOLIC BLOOD PRESSURE: 66 MMHG | SYSTOLIC BLOOD PRESSURE: 97 MMHG | TEMPERATURE: 98.7 F

## 2024-08-05 DIAGNOSIS — M54.50 ACUTE MIDLINE LOW BACK PAIN WITHOUT SCIATICA: Primary | ICD-10-CM

## 2024-08-05 PROCEDURE — 99284 EMERGENCY DEPT VISIT MOD MDM: CPT

## 2024-08-05 PROCEDURE — 6370000000 HC RX 637 (ALT 250 FOR IP): Performed by: NURSE PRACTITIONER

## 2024-08-05 PROCEDURE — 72131 CT LUMBAR SPINE W/O DYE: CPT

## 2024-08-05 RX ORDER — IBUPROFEN 600 MG/1
600 TABLET ORAL ONCE
Status: COMPLETED | OUTPATIENT
Start: 2024-08-05 | End: 2024-08-05

## 2024-08-05 RX ADMIN — IBUPROFEN 600 MG: 600 TABLET, FILM COATED ORAL at 13:35

## 2024-08-05 ASSESSMENT — PAIN SCALES - GENERAL
PAINLEVEL_OUTOF10: 9
PAINLEVEL_OUTOF10: 7
PAINLEVEL_OUTOF10: 8

## 2024-08-05 ASSESSMENT — PAIN DESCRIPTION - DESCRIPTORS: DESCRIPTORS: ACHING

## 2024-08-05 ASSESSMENT — PAIN - FUNCTIONAL ASSESSMENT: PAIN_FUNCTIONAL_ASSESSMENT: 0-10

## 2024-08-05 ASSESSMENT — PAIN DESCRIPTION - LOCATION
LOCATION: BACK;LEG;HIP
LOCATION: BACK

## 2024-08-06 NOTE — ED PROVIDER NOTES
status: Every Day     Current packs/day: 0.25     Average packs/day: 0.3 packs/day for 37.0 years (9.3 ttl pk-yrs)     Types: Cigarettes    Smokeless tobacco: Never    Tobacco comments:     10/31/17 0.5ppd   Vaping Use    Vaping Use: Never used   Substance Use Topics    Alcohol use: Not Currently     Comment: rarely    Drug use: No       SCREENINGS          [place] Coma Scale  Eye Opening: Spontaneous  Best Verbal Response: Oriented  Best Motor Response: Obeys commands  [place] Coma Scale Score: 15              CIWA Assessment  BP: 97/66  Pulse: 72             PHYSICAL EXAM  1 or more Elements     ED Triage Vitals [08/05/24 1213]   BP Temp Temp Source Pulse Respirations SpO2 Height Weight   100/72 98.7 °F (37.1 °C) Oral 89 16 96 % -- --       Physical Exam  Vitals and nursing note reviewed.   Constitutional:       Appearance: Normal appearance. She is not toxic-appearing or diaphoretic.   HENT:      Head: Normocephalic and atraumatic.      Nose: Nose normal.   Eyes:      General:         Right eye: No discharge.         Left eye: No discharge.   Cardiovascular:      Rate and Rhythm: Normal rate and regular rhythm.      Pulses: Normal pulses.      Heart sounds: No murmur heard.  Pulmonary:      Effort: Pulmonary effort is normal. No respiratory distress.      Breath sounds: No wheezing or rhonchi.   Abdominal:      Palpations: Abdomen is soft.      Tenderness: There is no abdominal tenderness. There is no guarding or rebound.   Musculoskeletal:         General: Normal range of motion.      Cervical back: Normal range of motion and neck supple.      Right lower leg: No edema.      Left lower leg: No edema.   Skin:     General: Skin is warm and dry.      Capillary Refill: Capillary refill takes less than 2 seconds.   Neurological:      General: No focal deficit present.      Mental Status: She is alert and oriented to person, place, and time.   Psychiatric:         Mood and Affect: Mood normal.         Behavior:

## 2024-11-12 ENCOUNTER — HOSPITAL ENCOUNTER (OUTPATIENT)
Dept: CT IMAGING | Age: 54
Discharge: HOME | End: 2024-11-12
Attending: INTERNAL MEDICINE
Payer: MEDICARE

## 2024-11-12 DIAGNOSIS — C34.12 SQUAMOUS CELL CARCINOMA OF BRONCHUS IN LEFT UPPER LOBE (HCC): ICD-10-CM

## 2024-11-12 PROCEDURE — 70490 CT SOFT TISSUE NECK W/O DYE: CPT

## 2024-11-12 PROCEDURE — 71250 CT THORAX DX C-: CPT

## 2024-11-20 ENCOUNTER — PROCEDURE VISIT (OUTPATIENT)
Dept: AUDIOLOGY | Age: 54
End: 2024-11-20

## 2024-11-20 ENCOUNTER — OFFICE VISIT (OUTPATIENT)
Dept: ENT CLINIC | Age: 54
End: 2024-11-20
Payer: MEDICARE

## 2024-11-20 VITALS
SYSTOLIC BLOOD PRESSURE: 115 MMHG | BODY MASS INDEX: 25.11 KG/M2 | WEIGHT: 160 LBS | HEIGHT: 67 IN | DIASTOLIC BLOOD PRESSURE: 83 MMHG | HEART RATE: 83 BPM

## 2024-11-20 DIAGNOSIS — H91.8X3 OTHER SPECIFIED HEARING LOSS, BILATERAL: Primary | ICD-10-CM

## 2024-11-20 DIAGNOSIS — H90.3 SENSORINEURAL HEARING LOSS (SNHL) OF BOTH EARS: ICD-10-CM

## 2024-11-20 DIAGNOSIS — R13.14 PHARYNGOESOPHAGEAL DYSPHAGIA: Primary | ICD-10-CM

## 2024-11-20 PROCEDURE — 99214 OFFICE O/P EST MOD 30 MIN: CPT | Performed by: OTOLARYNGOLOGY

## 2024-11-20 ASSESSMENT — ENCOUNTER SYMPTOMS
FACIAL SWELLING: 0
SHORTNESS OF BREATH: 0
TROUBLE SWALLOWING: 1
VOICE CHANGE: 0
EYE ITCHING: 0
SORE THROAT: 0
COUGH: 0
APNEA: 0
SINUS PRESSURE: 0

## 2024-11-20 NOTE — PROGRESS NOTES
Angella Arciniega   1970, 53 y.o. female   [id number]       Referring Provider: Cindi Webster DO  Referral Type: In an order in Epic    Reason for Visit: Evaluation of the cause of disorders of hearing, tinnitus, or balance.    ADULT AUDIOLOGIC EVALUATION      Angella Arciniega is a 53 y.o. female seen today, 11/20/2024 , for an initial audiologic evaluation.  Patient was seen by Cindi Webster DO following today's evaluation. Patient was accompanied by spouse.    AUDIOLOGIC AND OTHER PERTINENT MEDICAL HISTORY:      Patient reports wanting to get hearing tested.     Angella Arciniega denied otalgia, aural fullness, tinnitus, and dizziness.    Date: 11/20/2024     IMPRESSIONS:      Normal middle ear pressure and compliance, bilaterally.  Minimal responses obtained in the hearing loss range, bilaterally.  Word understanding was excellent at soft conversation level, bilaterally.  Reliability was considered poor due to inconsistent behavioral responses and excellent word understanding at a lower sensation.  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  Hearing aids would not be recommended until reliable hearing test or a threshold auditory brainstem response was completed.  Follow medical recommendations of Cindi Webster DO.     ASSESSMENT AND FINDINGS:     Otoscopy revealed: Clear ear canals bilaterally    RIGHT EAR:  Hearing Sensitivity: Minimal responses obtained within the mild to moderate range from 250-8000 Hz  Speech Recognition Threshold: 35 dB HL  Word Recognition:Excellent (90%), based on NU-6 10-word list at 45 dBHL using recorded speech stimuli.    Tympanometry: Normal peak pressure and compliance, Type A tympanogram, consistent with normal middle ear function.      LEFT EAR:  Hearing Sensitivity: Minimal responses obtained within the mild to moderate range from 250-8000 Hz  Speech Recognition Threshold: 25 dB HL  Word Recognition: Excellent (90%), based on NU-6 10-word list at 45 dBHL using recorded speech

## 2024-11-20 NOTE — PROGRESS NOTES
[facility]  [address], [address]  [place] [address]  P: [phone]       Patient     Angella Arciniega  1970    ChiefComplaint     Chief Complaint   Patient presents with    Hearing Problem       History of Present Illness     Yeni is a 53-year-old female here today for evaluation of hearing loss.  States her kids been complaining that she cannot hear them and yells.  Does not believe she has a hearing issue.  Reports choking on liquids and foods history of radiation to the chest for non-small cell lung cancer.    Past Medical History     Past Medical History:   Diagnosis Date    Anemia     CAD (coronary artery disease)     Cancer (HCC) 2016    non small cell lung cancer    Cerebral artery occlusion with cerebral infarction (HCC)     CHF (congestive heart failure) (HCC)     Depression     Diabetes mellitus (HCC)     Emphysema     Emphysema of lung (HCC)     Hyperlipidemia     Hypoglycemia     Psychiatric problem     Seizures (HCC)     Type 2 diabetes mellitus with hyperglycemia, without long-term current use of insulin (HCC) 03/16/2019       Past Surgical History     Past Surgical History:   Procedure Laterality Date    BACK SURGERY      CARDIAC DEFIBRILLATOR PLACEMENT      HYSTERECTOMY (CERVIX STATUS UNKNOWN)      LARYNGOSCOPY N/A 5/9/2023    DIRECT LARYNGOSCOPY MICROSCOPE OR TELESCOPE WITH BIOPSY AND TONSILLECTOMY OVER 12, ESOPHAGOSCOPY performed by Allan Garland MD at [facility]    LUNG BIOPSY Left 11/13/2017       Family History     Family History   Problem Relation Age of Onset    Stroke Mother     Migraines Mother     Stroke Father     High Cholesterol Father     Depression Sister     Migraines Sister        Social History     Social History     Tobacco Use    Smoking status: Every Day     Current packs/day: 0.25     Average packs/day: 0.3 packs/day for 37.0 years (9.3 ttl pk-yrs)     Types: Cigarettes    Smokeless tobacco: Never    Tobacco comments:     10/31/17 0.5ppd   Vaping Use

## 2025-01-09 ENCOUNTER — APPOINTMENT (OUTPATIENT)
Dept: GENERAL RADIOLOGY | Age: 55
End: 2025-01-09
Payer: MEDICARE

## 2025-01-09 ENCOUNTER — HOSPITAL ENCOUNTER (EMERGENCY)
Age: 55
Discharge: HOME | End: 2025-01-09
Attending: EMERGENCY MEDICINE
Payer: MEDICARE

## 2025-01-09 VITALS
DIASTOLIC BLOOD PRESSURE: 83 MMHG | HEIGHT: 67 IN | WEIGHT: 146.5 LBS | SYSTOLIC BLOOD PRESSURE: 124 MMHG | BODY MASS INDEX: 22.99 KG/M2 | OXYGEN SATURATION: 96 % | HEART RATE: 97 BPM | RESPIRATION RATE: 18 BRPM | TEMPERATURE: 98.5 F

## 2025-01-09 DIAGNOSIS — S63.501A SPRAIN OF RIGHT WRIST, INITIAL ENCOUNTER: Primary | ICD-10-CM

## 2025-01-09 DIAGNOSIS — R73.9 HYPERGLYCEMIA: ICD-10-CM

## 2025-01-09 LAB
GLUCOSE BLD-MCNC: 447 MG/DL (ref 70–99)
PERFORMED ON: ABNORMAL

## 2025-01-09 PROCEDURE — 73110 X-RAY EXAM OF WRIST: CPT

## 2025-01-09 PROCEDURE — 99283 EMERGENCY DEPT VISIT LOW MDM: CPT

## 2025-01-09 ASSESSMENT — PAIN DESCRIPTION - ORIENTATION: ORIENTATION: RIGHT

## 2025-01-09 ASSESSMENT — PAIN DESCRIPTION - FREQUENCY: FREQUENCY: INTERMITTENT

## 2025-01-09 ASSESSMENT — PAIN - FUNCTIONAL ASSESSMENT
PAIN_FUNCTIONAL_ASSESSMENT: 0-10
PAIN_FUNCTIONAL_ASSESSMENT: ACTIVITIES ARE NOT PREVENTED

## 2025-01-09 ASSESSMENT — ENCOUNTER SYMPTOMS
VOMITING: 0
NAUSEA: 0

## 2025-01-09 ASSESSMENT — PAIN DESCRIPTION - ONSET: ONSET: ON-GOING

## 2025-01-09 ASSESSMENT — PAIN DESCRIPTION - DESCRIPTORS: DESCRIPTORS: ACHING;STABBING

## 2025-01-09 ASSESSMENT — PAIN DESCRIPTION - PAIN TYPE: TYPE: ACUTE PAIN

## 2025-01-09 ASSESSMENT — PAIN DESCRIPTION - LOCATION: LOCATION: WRIST

## 2025-01-09 NOTE — DISCHARGE INSTRUCTIONS
Go home and take some extra regular insulin now and then make sure you get on a schedule with your long-acting insulin    Take Tylenol 650 mg every 4 hours  Take ibuprofen 600 mg every 6 hours for pain  Follow-up with a hand surgeon in 1 week call tomorrow for your appointment  Start recording your blood sugars and follow-up for possibly a insulin pump and/or a Dexcom

## 2025-01-09 NOTE — ED PROVIDER NOTES
[facility]  EMERGENCY DEPARTMENT ENCOUNTER      Pt Name: Angella Arciniega  MRN: [id number]  Birthdate 1970  Date of evaluation: 1/9/2025  Provider: FRANKY AUGUSTIN MD    CHIEF COMPLAINT       Chief Complaint   Patient presents with    Wrist Pain         HISTORY OF PRESENT ILLNESS   (Location/Symptom, Timing/Onset, Context/Setting, Quality, Duration, Modifying Factors, Severity)  Note limiting factors.     Angella Arciniega is a 54 y.o. female who presents to the emergency department     Patient presented to the emergency department complaining of some pain over the radiocarpal area of his of her right wrist she denies any known trauma said that she just woke up with it denies any repetitive movements either denies previous injuries she does have a history of non-small cell carcinoma of the lung emphysema and diabetes noncompliant on her diabetes she was wondering why her mouth was so dry I did check it was 447 she is not tachypneic there is no signs of DKA but I advised her that she would go into DKA if she did not start taking her insulin said that she has not taken her Lantus for the last 2 nights because she just keeps forgetting  Also did not take any regular insulin today  Patient at this point thus is advised to start taking her insulin immediately when she gets home        Nursing Notes were reviewed.    REVIEW OF SYSTEMS    (2-9 systems for level 4, 10 or more for level 5)     Review of Systems   Constitutional:  Positive for activity change and fatigue. Negative for diaphoresis.   Eyes:  Negative for visual disturbance.   Gastrointestinal:  Negative for nausea and vomiting.   Endocrine: Positive for polydipsia.   All other systems reviewed and are negative.      Except as noted above the remainder of the review of systems was reviewed and negative.       PAST MEDICAL HISTORY     Past Medical History:   Diagnosis Date    Anemia     CAD (coronary artery disease)     Cancer (HCC) 2016    non

## 2025-04-02 ENCOUNTER — OFFICE VISIT (OUTPATIENT)
Age: 55
End: 2025-04-02
Payer: MEDICARE

## 2025-04-02 VITALS
BODY MASS INDEX: 22.07 KG/M2 | OXYGEN SATURATION: 98 % | SYSTOLIC BLOOD PRESSURE: 92 MMHG | DIASTOLIC BLOOD PRESSURE: 77 MMHG | WEIGHT: 140.6 LBS | HEART RATE: 90 BPM | HEIGHT: 67 IN

## 2025-04-02 DIAGNOSIS — F44.5 FUNCTIONAL NEUROLOGICAL SYMPTOM DISORDER WITH ATTACKS OR SEIZURES: ICD-10-CM

## 2025-04-02 DIAGNOSIS — R79.9 ABNORMAL FINDING OF BLOOD CHEMISTRY, UNSPECIFIED: ICD-10-CM

## 2025-04-02 DIAGNOSIS — R20.0 NUMBNESS AND TINGLING IN BOTH HANDS: ICD-10-CM

## 2025-04-02 DIAGNOSIS — Z86.73 HISTORY OF TIA (TRANSIENT ISCHEMIC ATTACK): Primary | ICD-10-CM

## 2025-04-02 DIAGNOSIS — R20.2 NUMBNESS AND TINGLING IN BOTH HANDS: ICD-10-CM

## 2025-04-02 DIAGNOSIS — E11.40 PAINFUL DIABETIC NEUROPATHY (HCC): ICD-10-CM

## 2025-04-02 DIAGNOSIS — R26.9 GAIT DISORDER: ICD-10-CM

## 2025-04-02 DIAGNOSIS — G45.9 TIA (TRANSIENT ISCHEMIC ATTACK): ICD-10-CM

## 2025-04-02 PROCEDURE — 99204 OFFICE O/P NEW MOD 45 MIN: CPT | Performed by: STUDENT IN AN ORGANIZED HEALTH CARE EDUCATION/TRAINING PROGRAM

## 2025-04-02 PROCEDURE — 2022F DILAT RTA XM EVC RTNOPTHY: CPT | Performed by: STUDENT IN AN ORGANIZED HEALTH CARE EDUCATION/TRAINING PROGRAM

## 2025-04-02 PROCEDURE — 4004F PT TOBACCO SCREEN RCVD TLK: CPT | Performed by: STUDENT IN AN ORGANIZED HEALTH CARE EDUCATION/TRAINING PROGRAM

## 2025-04-02 PROCEDURE — G8420 CALC BMI NORM PARAMETERS: HCPCS | Performed by: STUDENT IN AN ORGANIZED HEALTH CARE EDUCATION/TRAINING PROGRAM

## 2025-04-02 PROCEDURE — 3017F COLORECTAL CA SCREEN DOC REV: CPT | Performed by: STUDENT IN AN ORGANIZED HEALTH CARE EDUCATION/TRAINING PROGRAM

## 2025-04-02 PROCEDURE — G8427 DOCREV CUR MEDS BY ELIG CLIN: HCPCS | Performed by: STUDENT IN AN ORGANIZED HEALTH CARE EDUCATION/TRAINING PROGRAM

## 2025-04-02 PROCEDURE — 3046F HEMOGLOBIN A1C LEVEL >9.0%: CPT | Performed by: STUDENT IN AN ORGANIZED HEALTH CARE EDUCATION/TRAINING PROGRAM

## 2025-04-02 RX ORDER — GABAPENTIN 100 MG/1
CAPSULE ORAL
Qty: 63 CAPSULE | Refills: 0 | Status: SHIPPED | OUTPATIENT
Start: 2025-04-02 | End: 2025-04-16

## 2025-04-02 RX ORDER — ALBUTEROL SULFATE 90 UG/1
INHALANT RESPIRATORY (INHALATION)
COMMUNITY
Start: 2025-02-08

## 2025-04-02 RX ORDER — DIAZEPAM 5 MG/1
TABLET ORAL
COMMUNITY
Start: 2025-02-26

## 2025-04-02 RX ORDER — CYCLOBENZAPRINE HCL 10 MG
10 TABLET ORAL 3 TIMES DAILY
COMMUNITY
Start: 2025-02-25

## 2025-04-02 RX ORDER — ROSUVASTATIN CALCIUM 20 MG/1
20 TABLET, COATED ORAL NIGHTLY
Qty: 30 TABLET | Refills: 3 | Status: SHIPPED | OUTPATIENT
Start: 2025-04-02

## 2025-04-02 RX ORDER — GABAPENTIN 300 MG/1
300 CAPSULE ORAL 2 TIMES DAILY
Qty: 60 CAPSULE | Refills: 11 | Status: SHIPPED | OUTPATIENT
Start: 2025-04-02 | End: 2026-03-28

## 2025-04-02 RX ORDER — ASPIRIN 81 MG/1
81 TABLET ORAL DAILY
Qty: 90 TABLET | Refills: 3 | Status: SHIPPED | OUTPATIENT
Start: 2025-04-02 | End: 2026-03-28

## 2025-04-02 NOTE — PATIENT INSTRUCTIONS
Follow up with your cardiologist to determine if you have atrial fibrillation. If you do, then I would recommend that you use Eliquis instead of aspirin.     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 I ordered some tests to evaluate for additional neurological conditions that cause symptoms or signs that may be masked by the functional weakness.     Here are useful web sites to learn more about functional neurological disorder:   <https://neurosymptoms.org/en/>  <https://fndhope.org/>    Purchase and read this book that utilizes cognitive behavioral therapy to treat your functional neurological disorder:   Overcoming Functional Neurological Symptoms: A Five Areas Approach 1st Edition  Authors: Alex Gonzalez, Seven Hamilton, Brooke Jolly, Dinh Ash, Salas Amado, Ken Reid

## 2025-04-02 NOTE — PROGRESS NOTES
History of Present Illness  New patient visit for neuropathy. Accompanied by three grandsons.     She reports being diagnosed with neuropathy affecting both her feet and hands, with pain progressively ascending from her legs to her back. Initial symptoms, which began approximately a year ago, included tingling, numbness, and burning sensations in her bilateral feet. Initially diagnosed with tendinitis and carpal tunnel syndrome, the diagnosis of neuropathy was later confirmed. An EMG test conducted three years ago revealed nerve damage, attributed to multiple fractures and torn ligaments in her left ankle. Constant numbness and tingling in her left foot have been present for several months, previously intermittent. This symptom onset coincided with her cancer diagnosis six years ago and was attributed to chemotherapy. No similar symptoms were reported in the right foot or hands at that time. Additionally, she reports that her toes turn blue at night, even when her feet are warm. She has been prescribed diazepam and Flexeril for her hand and foot pain but has not tried gabapentin. Atorvastatin was prescribed in the past, which caused vomiting. She has not tried Crestor or pravastatin.    A history of non-small cell lung cancer, which has been in remission for six years, is noted. She underwent nine different types of chemotherapy.    Diabetes was diagnosed approximately six years ago.    A history of pseudoseizures or psychogenic nonepileptic seizures is reported, with one seizure this year, one last year, and another three years prior. Both seizures occurred during house moves.    The first stroke occurred 12 years ago, presenting with left-sided weakness and slurred speech. A similar episode occurred last month, resolving within a few hours. She was advised to discontinue aspirin due to minor bleeding episodes.    Pain in the lower back and hip occasionally impedes her ability to walk. A walker is used for support

## 2025-04-05 ENCOUNTER — APPOINTMENT (OUTPATIENT)
Dept: GENERAL RADIOLOGY | Age: 55
End: 2025-04-05
Payer: MEDICARE

## 2025-04-05 ENCOUNTER — HOSPITAL ENCOUNTER (EMERGENCY)
Age: 55
Discharge: HOME | End: 2025-04-06
Attending: EMERGENCY MEDICINE
Payer: MEDICARE

## 2025-04-05 VITALS
BODY MASS INDEX: 22.32 KG/M2 | RESPIRATION RATE: 15 BRPM | DIASTOLIC BLOOD PRESSURE: 91 MMHG | SYSTOLIC BLOOD PRESSURE: 134 MMHG | HEIGHT: 67 IN | HEART RATE: 87 BPM | OXYGEN SATURATION: 100 % | WEIGHT: 142.2 LBS | TEMPERATURE: 98.1 F

## 2025-04-05 DIAGNOSIS — M25.571 ACUTE RIGHT ANKLE PAIN: ICD-10-CM

## 2025-04-05 DIAGNOSIS — S93.601A SPRAIN OF RIGHT FOOT, INITIAL ENCOUNTER: Primary | ICD-10-CM

## 2025-04-05 PROCEDURE — 99283 EMERGENCY DEPT VISIT LOW MDM: CPT

## 2025-04-05 PROCEDURE — 73630 X-RAY EXAM OF FOOT: CPT

## 2025-04-05 PROCEDURE — 73610 X-RAY EXAM OF ANKLE: CPT

## 2025-04-05 RX ORDER — KETOROLAC TROMETHAMINE 15 MG/ML
30 INJECTION, SOLUTION INTRAMUSCULAR; INTRAVENOUS ONCE
Status: DISCONTINUED | OUTPATIENT
Start: 2025-04-05 | End: 2025-04-06 | Stop reason: HOSPADM

## 2025-04-05 ASSESSMENT — PAIN SCALES - GENERAL: PAINLEVEL_OUTOF10: 7

## 2025-04-05 ASSESSMENT — LIFESTYLE VARIABLES
HOW OFTEN DO YOU HAVE A DRINK CONTAINING ALCOHOL: NEVER
HOW MANY STANDARD DRINKS CONTAINING ALCOHOL DO YOU HAVE ON A TYPICAL DAY: PATIENT DOES NOT DRINK

## 2025-04-06 NOTE — DISCHARGE INSTRUCTIONS
The x-ray imaging does not show any fractures.  You likely have muscle contusion and bruising.  you may take Tylenol and ibuprofen for pain and discomfort.  Please use the walking boot for support and schedule follow-up with your orthopedic doctor

## 2025-04-06 NOTE — ED PROVIDER NOTES
98.1 °F (36.7 °C)  87 15 100 %      Height Weight - Scale         04/05/25 2234 04/05/25 2234         1.702 m (5' 7\") 64.5 kg (142 lb 3.2 oz)              Physical Exam   There is pain to palpation over the dorsum of the right foot, there is no significant overlying erythema, swelling.  There is no ischemic change to the right foot, pain to palpation of the medial aspect of the right ankle no obvious deformity.  There is pain to palpation of the right sacroiliac region, no midline lumbar spine tenderness.    DIAGNOSTIC RESULTS   LABS:   Labs Reviewed - No data to display   When ordered only abnormal lab results are displayed. All other labs were within normal range or not returned as of this dictation.     EKG:      RADIOLOGY:    Non-plain film images such as CT, Ultrasound and MRI are read by the radiologist. Plain radiographic images are visualized and preliminarily interpreted by the ED Provider with the below findings:      Interpretation per the Radiologist below, if available at the time of this note:    XR FOOT RIGHT (MIN 3 VIEWS)   Final Result   No acute osseous abnormality identified.         XR ANKLE RIGHT (MIN 3 VIEWS)   Final Result   No acute osseous abnormality identified.                    Vitals:    Vitals:    04/05/25 2230 04/05/25 2234 04/05/25 2235   BP: (!) 134/91     Pulse: 87     Resp:   15   Temp: 98.1 °F (36.7 °C)     SpO2: 100%     Weight:  64.5 kg (142 lb 3.2 oz)    Height:  1.702 m (5' 7\")         Patient was given the following medications:   Medications   ketorolac (TORADOL) injection 30 mg (has no administration in time range)             EMERGENCY DEPARTMENT COURSE, DDx, and Reassessment:    54-year-old female presenting valuation of right foot pain, right ankle pain.  Suspect soft tissue contusion.  Will obtain x-ray imaging to evaluate for osseous abnormality.  Will be placed in a walking boot for support device and supportive care    X-ray imaging without acute osseous

## 2025-04-21 ENCOUNTER — TELEPHONE (OUTPATIENT)
Dept: NEUROLOGY | Age: 55
End: 2025-04-21

## 2025-04-21 DIAGNOSIS — E11.40 PAINFUL DIABETIC NEUROPATHY (HCC): Primary | ICD-10-CM

## 2025-04-21 NOTE — TELEPHONE ENCOUNTER
LOV: 4/2/25  NOV 8/20/25    Patient states she took 1 gabapentin and began hallucinating and felt high as a kite.     Patient didn't take any more. She would like to go back to Diazepam as patient was taking that before.    Please review and advise

## 2025-04-21 NOTE — TELEPHONE ENCOUNTER
Pt called and stated she took 1 gabapentin and began hallucinating and felt high as a kite. Pt discontinued medication. Pt would like to switch to diazepam, as pt was taking before prescribed by Dr. Frazier.  Pt uses [facility] pharmacy in [place].     Please advise.

## 2025-04-22 NOTE — TELEPHONE ENCOUNTER
Patient states that she just wants something that will help calm her legs so she can sleep.  She reports that she was up all night long with her leg pain.     She states she has not tried Amitriptyline or nortriptyline     She is open to anything, even if it is a cream that would help.     Please review and advise

## 2025-04-23 NOTE — TELEPHONE ENCOUNTER
Starting amitriptyline for painful diabetic neuropathy and insomnia due to painful diabetic neuropathy.  Start 12.5 mg nightly for 1 week then 25 mg nightly goal dose.  Continue goal dose for at least 2 weeks before we consider to increase the dose further if inadequate but well-tolerated.

## 2025-04-24 NOTE — TELEPHONE ENCOUNTER
Patient aware of the new medication and dosage.     Instructed her to call us back 2 weeks after reaching goal dose and let us know how it is working for her.     She voiced understanding and had no questions

## 2025-05-13 ENCOUNTER — HOSPITAL ENCOUNTER (OUTPATIENT)
Dept: CT IMAGING | Age: 55
Discharge: HOME | End: 2025-05-13
Attending: INTERNAL MEDICINE
Payer: MEDICARE

## 2025-05-13 DIAGNOSIS — C34.12 SQUAMOUS CELL CARCINOMA OF BRONCHUS IN LEFT UPPER LOBE (HCC): ICD-10-CM

## 2025-05-13 PROCEDURE — 74176 CT ABD & PELVIS W/O CONTRAST: CPT

## 2025-05-19 ENCOUNTER — TELEPHONE (OUTPATIENT)
Dept: NEUROLOGY | Age: 55
End: 2025-05-19

## 2025-05-19 NOTE — TELEPHONE ENCOUNTER
Patient would like to be sedated for the MRI/MRA.    She is very claustrophobic.     They are not scheduled yet,     Please place new orders to include sedation.

## 2025-06-26 ENCOUNTER — APPOINTMENT (OUTPATIENT)
Dept: GENERAL RADIOLOGY | Age: 55
DRG: 200 | End: 2025-06-26
Attending: INTERNAL MEDICINE
Payer: MEDICARE

## 2025-06-26 ENCOUNTER — HOSPITAL ENCOUNTER (INPATIENT)
Age: 55
LOS: 2 days | Discharge: ANOTHER HOSPITAL | DRG: 200 | End: 2025-06-28
Attending: INTERNAL MEDICINE | Admitting: INTERNAL MEDICINE
Payer: MEDICARE

## 2025-06-26 PROBLEM — J93.9 PNEUMOTHORAX: Status: ACTIVE | Noted: 2025-06-26

## 2025-06-26 LAB
ALBUMIN SERPL-MCNC: 3.9 G/DL (ref 3.4–5)
ALBUMIN/GLOB SERPL: 1.1 {RATIO} (ref 1.1–2.2)
ALP SERPL-CCNC: 94 U/L (ref 40–129)
ALT SERPL-CCNC: 7 U/L (ref 10–40)
ANION GAP SERPL CALCULATED.3IONS-SCNC: 10 MMOL/L (ref 3–16)
AST SERPL-CCNC: 13 U/L (ref 15–37)
BASOPHILS # BLD: 0.1 K/UL (ref 0–0.2)
BASOPHILS NFR BLD: 0.6 %
BILIRUB SERPL-MCNC: 0.3 MG/DL (ref 0–1)
BUN SERPL-MCNC: 20 MG/DL (ref 7–20)
CALCIUM SERPL-MCNC: 10 MG/DL (ref 8.3–10.6)
CHLORIDE SERPL-SCNC: 95 MMOL/L (ref 99–110)
CO2 SERPL-SCNC: 30 MMOL/L (ref 21–32)
CREAT SERPL-MCNC: 0.7 MG/DL (ref 0.6–1.1)
DEPRECATED RDW RBC AUTO: 13.1 % (ref 12.4–15.4)
EOSINOPHIL # BLD: 0 K/UL (ref 0–0.6)
EOSINOPHIL NFR BLD: 0.1 %
GFR SERPLBLD CREATININE-BSD FMLA CKD-EPI: >90 ML/MIN/{1.73_M2}
GLUCOSE BLD-MCNC: 183 MG/DL (ref 70–99)
GLUCOSE BLD-MCNC: 205 MG/DL (ref 70–99)
GLUCOSE SERPL-MCNC: 206 MG/DL (ref 70–99)
HCT VFR BLD AUTO: 39.7 % (ref 36–48)
HGB BLD-MCNC: 13.3 G/DL (ref 12–16)
LYMPHOCYTES # BLD: 1.1 K/UL (ref 1–5.1)
LYMPHOCYTES NFR BLD: 9.5 %
MCH RBC QN AUTO: 28.7 PG (ref 26–34)
MCHC RBC AUTO-ENTMCNC: 33.4 G/DL (ref 31–36)
MCV RBC AUTO: 85.8 FL (ref 80–100)
MONOCYTES # BLD: 0.9 K/UL (ref 0–1.3)
MONOCYTES NFR BLD: 7.3 %
NEUTROPHILS # BLD: 9.6 K/UL (ref 1.7–7.7)
NEUTROPHILS NFR BLD: 82.5 %
PERFORMED ON: ABNORMAL
PERFORMED ON: ABNORMAL
PLATELET # BLD AUTO: 283 K/UL (ref 135–450)
PMV BLD AUTO: 7.7 FL (ref 5–10.5)
POTASSIUM SERPL-SCNC: 4.6 MMOL/L (ref 3.5–5.1)
PROT SERPL-MCNC: 7.6 G/DL (ref 6.4–8.2)
RBC # BLD AUTO: 4.63 M/UL (ref 4–5.2)
SODIUM SERPL-SCNC: 135 MMOL/L (ref 136–145)
WBC # BLD AUTO: 11.7 K/UL (ref 4–11)

## 2025-06-26 PROCEDURE — 36415 COLL VENOUS BLD VENIPUNCTURE: CPT

## 2025-06-26 PROCEDURE — 74018 RADEX ABDOMEN 1 VIEW: CPT

## 2025-06-26 PROCEDURE — 6360000002 HC RX W HCPCS: Performed by: STUDENT IN AN ORGANIZED HEALTH CARE EDUCATION/TRAINING PROGRAM

## 2025-06-26 PROCEDURE — 85025 COMPLETE CBC W/AUTO DIFF WBC: CPT

## 2025-06-26 PROCEDURE — 99223 1ST HOSP IP/OBS HIGH 75: CPT | Performed by: INTERNAL MEDICINE

## 2025-06-26 PROCEDURE — 71045 X-RAY EXAM CHEST 1 VIEW: CPT

## 2025-06-26 PROCEDURE — 2060000000 HC ICU INTERMEDIATE R&B

## 2025-06-26 PROCEDURE — 6370000000 HC RX 637 (ALT 250 FOR IP): Performed by: STUDENT IN AN ORGANIZED HEALTH CARE EDUCATION/TRAINING PROGRAM

## 2025-06-26 PROCEDURE — 80053 COMPREHEN METABOLIC PANEL: CPT

## 2025-06-26 RX ORDER — LEVALBUTEROL INHALATION SOLUTION 1.25 MG/3ML
1 SOLUTION RESPIRATORY (INHALATION) EVERY 8 HOURS PRN
COMMUNITY

## 2025-06-26 RX ORDER — ONDANSETRON 2 MG/ML
4 INJECTION INTRAMUSCULAR; INTRAVENOUS EVERY 6 HOURS PRN
Status: DISCONTINUED | OUTPATIENT
Start: 2025-06-26 | End: 2025-06-28 | Stop reason: HOSPADM

## 2025-06-26 RX ORDER — SODIUM CHLORIDE 0.9 % (FLUSH) 0.9 %
5-40 SYRINGE (ML) INJECTION EVERY 12 HOURS SCHEDULED
Status: DISCONTINUED | OUTPATIENT
Start: 2025-06-26 | End: 2025-06-28 | Stop reason: HOSPADM

## 2025-06-26 RX ORDER — ENOXAPARIN SODIUM 100 MG/ML
40 INJECTION SUBCUTANEOUS DAILY
Status: DISCONTINUED | OUTPATIENT
Start: 2025-06-26 | End: 2025-06-28 | Stop reason: HOSPADM

## 2025-06-26 RX ORDER — MAGNESIUM SULFATE IN WATER 40 MG/ML
2000 INJECTION, SOLUTION INTRAVENOUS PRN
Status: DISCONTINUED | OUTPATIENT
Start: 2025-06-26 | End: 2025-06-28 | Stop reason: HOSPADM

## 2025-06-26 RX ORDER — SODIUM CHLORIDE 9 MG/ML
INJECTION, SOLUTION INTRAVENOUS PRN
Status: DISCONTINUED | OUTPATIENT
Start: 2025-06-26 | End: 2025-06-28 | Stop reason: HOSPADM

## 2025-06-26 RX ORDER — SODIUM CHLORIDE 0.9 % (FLUSH) 0.9 %
5-40 SYRINGE (ML) INJECTION PRN
Status: DISCONTINUED | OUTPATIENT
Start: 2025-06-26 | End: 2025-06-28 | Stop reason: HOSPADM

## 2025-06-26 RX ORDER — RISANKIZUMAB-RZAA 150 MG/ML
150 INJECTION SUBCUTANEOUS
COMMUNITY

## 2025-06-26 RX ORDER — POLYETHYLENE GLYCOL 3350 17 G/17G
17 POWDER, FOR SOLUTION ORAL DAILY PRN
Status: DISCONTINUED | OUTPATIENT
Start: 2025-06-26 | End: 2025-06-27

## 2025-06-26 RX ORDER — POTASSIUM CHLORIDE 7.45 MG/ML
10 INJECTION INTRAVENOUS PRN
Status: DISCONTINUED | OUTPATIENT
Start: 2025-06-26 | End: 2025-06-28 | Stop reason: HOSPADM

## 2025-06-26 RX ORDER — ACETAMINOPHEN 325 MG/1
650 TABLET ORAL
Status: DISCONTINUED | OUTPATIENT
Start: 2025-06-26 | End: 2025-06-28 | Stop reason: HOSPADM

## 2025-06-26 RX ORDER — ONDANSETRON 4 MG/1
4 TABLET, ORALLY DISINTEGRATING ORAL EVERY 8 HOURS PRN
Status: DISCONTINUED | OUTPATIENT
Start: 2025-06-26 | End: 2025-06-28 | Stop reason: HOSPADM

## 2025-06-26 RX ORDER — POTASSIUM CHLORIDE 1500 MG/1
40 TABLET, EXTENDED RELEASE ORAL PRN
Status: DISCONTINUED | OUTPATIENT
Start: 2025-06-26 | End: 2025-06-28 | Stop reason: HOSPADM

## 2025-06-26 RX ORDER — GLUCAGON 1 MG/ML
1 KIT INJECTION PRN
Status: DISCONTINUED | OUTPATIENT
Start: 2025-06-26 | End: 2025-06-28 | Stop reason: HOSPADM

## 2025-06-26 RX ORDER — INSULIN LISPRO 100 [IU]/ML
0-8 INJECTION, SOLUTION INTRAVENOUS; SUBCUTANEOUS
Status: DISCONTINUED | OUTPATIENT
Start: 2025-06-26 | End: 2025-06-28 | Stop reason: HOSPADM

## 2025-06-26 RX ORDER — DEXTROSE MONOHYDRATE 100 MG/ML
INJECTION, SOLUTION INTRAVENOUS CONTINUOUS PRN
Status: DISCONTINUED | OUTPATIENT
Start: 2025-06-26 | End: 2025-06-28 | Stop reason: HOSPADM

## 2025-06-26 RX ORDER — NITROGLYCERIN 0.4 MG/1
0.4 TABLET SUBLINGUAL PRN
COMMUNITY

## 2025-06-26 RX ORDER — OXYCODONE HYDROCHLORIDE 5 MG/1
10 TABLET ORAL EVERY 8 HOURS PRN
Refills: 0 | Status: DISCONTINUED | OUTPATIENT
Start: 2025-06-26 | End: 2025-06-27

## 2025-06-26 RX ORDER — DIPHENHYDRAMINE HYDROCHLORIDE, ZINC ACETATE 2; .1 G/100G; G/100G
CREAM TOPICAL 3 TIMES DAILY PRN
Status: DISCONTINUED | OUTPATIENT
Start: 2025-06-26 | End: 2025-06-28 | Stop reason: HOSPADM

## 2025-06-26 RX ORDER — IPRATROPIUM BROMIDE AND ALBUTEROL SULFATE 2.5; .5 MG/3ML; MG/3ML
1 SOLUTION RESPIRATORY (INHALATION) EVERY 6 HOURS PRN
Status: DISCONTINUED | OUTPATIENT
Start: 2025-06-26 | End: 2025-06-27

## 2025-06-26 RX ADMIN — INSULIN LISPRO 2 UNITS: 100 INJECTION, SOLUTION INTRAVENOUS; SUBCUTANEOUS at 18:33

## 2025-06-26 RX ADMIN — OXYCODONE 10 MG: 5 TABLET ORAL at 18:33

## 2025-06-26 RX ADMIN — INSULIN LISPRO 2 UNITS: 100 INJECTION, SOLUTION INTRAVENOUS; SUBCUTANEOUS at 21:02

## 2025-06-26 RX ADMIN — ENOXAPARIN SODIUM 40 MG: 100 INJECTION SUBCUTANEOUS at 21:02

## 2025-06-26 ASSESSMENT — PAIN DESCRIPTION - PAIN TYPE
TYPE: ACUTE PAIN

## 2025-06-26 ASSESSMENT — PAIN DESCRIPTION - LOCATION
LOCATION: HEAD
LOCATION: FLANK
LOCATION: FLANK

## 2025-06-26 ASSESSMENT — PAIN DESCRIPTION - FREQUENCY
FREQUENCY: CONTINUOUS
FREQUENCY: CONTINUOUS
FREQUENCY: INTERMITTENT

## 2025-06-26 ASSESSMENT — PAIN DESCRIPTION - DESCRIPTORS
DESCRIPTORS: ACHING
DESCRIPTORS: THROBBING
DESCRIPTORS: ACHING

## 2025-06-26 ASSESSMENT — PAIN - FUNCTIONAL ASSESSMENT
PAIN_FUNCTIONAL_ASSESSMENT: PREVENTS OR INTERFERES SOME ACTIVE ACTIVITIES AND ADLS
PAIN_FUNCTIONAL_ASSESSMENT: ACTIVITIES ARE NOT PREVENTED
PAIN_FUNCTIONAL_ASSESSMENT: PREVENTS OR INTERFERES SOME ACTIVE ACTIVITIES AND ADLS

## 2025-06-26 ASSESSMENT — PAIN DESCRIPTION - ONSET
ONSET: ON-GOING

## 2025-06-26 ASSESSMENT — PAIN DESCRIPTION - ORIENTATION
ORIENTATION: MID
ORIENTATION: LEFT
ORIENTATION: LEFT

## 2025-06-26 ASSESSMENT — PAIN SCALES - GENERAL
PAINLEVEL_OUTOF10: 5
PAINLEVEL_OUTOF10: 10
PAINLEVEL_OUTOF10: 3

## 2025-06-26 NOTE — PLAN OF CARE
Problem: Chronic Conditions and Co-morbidities  Goal: Patient's chronic conditions and co-morbidity symptoms are monitored and maintained or improved  Outcome: Progressing     Problem: Discharge Planning  Goal: Discharge to home or other facility with appropriate resources  Outcome: Progressing  Flowsheets (Taken 6/26/2025 1901)  Discharge to home or other facility with appropriate resources:   Identify barriers to discharge with patient and caregiver   Arrange for needed discharge resources and transportation as appropriate   Identify discharge learning needs (meds, wound care, etc)   Arrange for interpreters to assist at discharge as needed   Refer to discharge planning if patient needs post-hospital services based on physician order or complex needs related to functional status, cognitive ability or social support system     Problem: Risk for Elopement  Goal: Patient will not exit the unit/facility without proper excort  Outcome: Progressing  Flowsheets (Taken 6/26/2025 1500)  Nursing Interventions for Elopement Risk:   Collaborate with family members/caregivers to mitigate the elopement risk   Collaborate with treatment team for drug withdrawal symptoms treatment   Collaborate with treatment team for nicotine replacement   Communicate/escalate to charge nurse the risk of elopement   Communicate/escalate to /other team member the risk of elopement   Communicate/escalate to nursing supervisor the risk of elopement   Communicate to physician the risk for elopement     Problem: Safety - Adult  Goal: Free from fall injury  Outcome: Progressing     Problem: Pain  Goal: Verbalizes/displays adequate comfort level or baseline comfort level  Outcome: Progressing

## 2025-06-26 NOTE — H&P
V2.0  History and Physical      Name:  Angella Arciniega /Age/Sex: 1970  (54 y.o. 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 This has never happened before. She was diagnosed with Lefft sided pneumothorax. Was provided with thora seal with subsequent improvement in SOB. Was then transferred to [facility] for further management.     Left side pneumothorax  SOB  Increasing cough  -Repeat Chest xray. Continue Tele  -Pulmonology consult.   -PT/OT.     COPD  Emphysema  -Nebs as ordered.     Hx of NSCLC in remission  -Follows outpatient with Oncology.     Possible Hx of previous stroke     T2DM  -Insulin as ordered.     Hx of smoking    Constipation  -Check KUB. Laxatives as ordered.     Disposition:   Current Living situation: Home  Expected Disposition: TBD  Estimated D/C: TBD    Diet Diet NPO   DVT Prophylaxis [x] Lovenox, []  Heparin, [] SCDs, [] Ambulation,  [] Eliquis, [] Xarelto, [] Coumadin   Code Status Full Code   Surrogate Decision Maker/ POA On file.      Personally reviewed Lab Studies and Imaging     Discussed management of the case with case management.     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 This has never happened before. She was diagnosed with Lefft sided pneumothorax. Was

## 2025-06-26 NOTE — CONSULTS
Clinical Pharmacy Consult Note  Medication History     Admit Date: 6/26/2025    Pharmacy consulted to verify home medication list by Dr. Jimmy Estrada.    List of current medications patient is taking is complete. Home Medication list in Epic updated to reflect changes noted below.    Source of information: fill history, chart review     Patient's home pharmacy:   [id number] - [place], [place] - [address] - P [phone] - F [phone]  [address] [address]  Phone: [phone] Fax: [phone]    [facility] [address] [address] [phone] - F [phone]  [address] [address] [address]  Phone: [phone] Fax: [phone]      Changes made to medication list:   Medications removed: (include reason, ex: therapy completed, patient no longer taking, etc.)  Amiodarone 100 mg tablets - not taking, last filled 2023   Apremilast 30 mg tablets - not taking, last filled 2023   Fluticasone 110 mcg/act inhaler - not taking, last filled 2022   Ibuprofen 800 mg tablets - not taking   Levalbuterol 45 mcg/act inhaler - not taking   Spiriva respimat - no longer taking   Lantus - alternate therapy   Medications added:   Levalbuterol 1.25 mg/3mL nebulizer solution - PRN   Nitroglycerin 0.4 mg SL tablet - PRN chest pain   Skyrizi 150 mcg/mL syringe - inject 150 mg SC once every 12 weeks   Levemir - Patient takes 20 units nightly   Medication doses adjusted:   Amitriptyline dose adjusted from 12.5 mg nightly to 25 mg nightly   Oxycodone 10 mg tablets - dose adjusted from Q8H PRN to Q4H PRN   Insulin aspart dose adjusted to 10-20 units TID with meals. Per patient, has had frequent dose adjustments recently. She was on 20 units, then decreased to 15 units, then 10  units and now back up to 20 units.  Other notes:   Recently filled Azithromycin 250 mg tablets on 6/24/25 for #6 for 5 days   Levofloxacin 500 mg #10 for 10 days filled 5/20/25  Per

## 2025-06-26 NOTE — PROGRESS NOTES
4 Eyes Skin Assessment     NAME:  Angella Arciniega  YOB: 1970  MEDICAL RECORD NUMBER:  [id number]    The patient is being assessed for  Admission    I agree that at least one RN has performed a thorough Head to Toe Skin Assessment on the patient. ALL assessment sites listed below have been assessed.      Areas assessed by both nurses:    Head, Face, Ears, Shoulders, Back, Chest, Arms, Elbows, Hands, Sacrum. Buttock, Coccyx, Ischium, and Legs. Feet and Heels  Scattered abrasions        Does the Patient have a Wound? No noted wound(s)       Alonso Prevention initiated by RN: No  Wound Care Orders initiated by RN: No    Pressure Injury (Stage 3,4, Unstageable, DTI, NWPT, and Complex wounds) if present, place Wound referral order by RN under : No    New Ostomies, if present place, Ostomy referral order under : No     Nurse 1 eSignature: Electronically signed by Valentino España RN on 6/26/25 at 3:15 PM EDT    **SHARE this note so that the co-signing nurse can place an eSignature**    Nurse 2 eSignature: Electronically signed by Julian Negron RN on 6/26/25 at 1:11 PM EDT

## 2025-06-26 NOTE — CONSULTS
Initial Pulmonary & Critical Care Consult Note    Patient Name: Angella Arciniega   Patient : 1970   Date: 2025   Admit Date: 2025   CC:  Dyspnea     Reason for Consult: Pneumothorax   Requesting Physician: Jimmy Estrada MD      Subjective   HPI:    55yo F with pmhx COPD, NSCLC s/p chemo/radiation in remission (), DM2, seizures/PNES, neuropathy presenting as transfer from [facility] for pneumothorax.    Per chart review and pt, pt c/o roughly 1 week of increased dyspnea and wheezing then developed sudden onset L chest pain accompanied by dyspnea and difficulty talking/taking deep breaths several days prior to presentation. She was found to have L sided pneumothorax on XR and had Heimlich valve placed in ED with noted resolution of pneumothorax on f/u XR. Lab work otherwise unremarkable aside from elevated D dimer there with CTA negative for PE.     Pt currently sitting up in bed in no apparent distress. She denies any significant dyspnea or cough at preset time but states her valve has continued to \"fart\" since placement and arrival here with increased frequency on movement.    Of note, pt reports only using her prn Albuterol once/week recently, but also states she \"uses all of her medications as needed.\" She continues to smoke 1-1.5 packs per day and judges her blood sugars by symptoms. Denies hx of pneumothorax or similar sx.     Past Medical History:      Diagnosis Date    Anemia     CAD (coronary artery disease)     Cerebral artery occlusion with cerebral infarction (HCC)     CHF (congestive heart failure) ([facility])     Depression     Diabetes mellitus (HCC)     Emphysema of lung (HCC)     Hyperlipidemia     Hypoglycemia     Non-small cell lung cancer (NSCLC) ([facility]) 2016    Psychiatric problem     Psychogenic nonepileptic seizure     Type 2 diabetes mellitus with hyperglycemia, without long-term current use of insulin ([facility]) 2019        Past Surgical History:        Procedure

## 2025-06-26 NOTE — PLAN OF CARE
[facility] Hospitalist Transfer accept note  Transfer center PS received  Case reviewed with ER physician  Reason for Transfer:  Angella Arciniega 54 y.o. female with past medical history of CAD, CHF, diabetes, COPD, lung cancer who presented to [facility] with a complaint of cough and wheezing for the past 1 week.  Chest x-ray shows left-sided pneumothorax.  Chest CT also showed left-sided pneumothorax with no evidence of pulmonary embolism.  Patient had a left-sided chest tube placed.  Patient also was treated for COPD exacerbation.  Patient is transferred to the [facility] for further management of pneumothorax and chest tube      Once patient arrive please page ON CALL HOSPITALIST so patient can be seen.   If unable to reach physician on [employer] please call hospitalist phone (#[phone])     PCP: Deejay Frazier MD     Thanks  Ángel Mathis MD  Hospitalist

## 2025-06-26 NOTE — PROGRESS NOTES
Notified MD and Pulmonologist that pt was demanding to leave and have chest valve removed.  Pt stated that if she dies she dies, but she's going home.  Pulmonologist had me encourage pt to stay and emphasize that she probably will die without it.  I let the pt know that we won't be removing it and that if ama papers are signed, she would need to go somewhere else to remove it.  Pt later calmed down and decided to stay.

## 2025-06-27 ENCOUNTER — APPOINTMENT (OUTPATIENT)
Dept: GENERAL RADIOLOGY | Age: 55
DRG: 200 | End: 2025-06-27
Attending: INTERNAL MEDICINE
Payer: MEDICARE

## 2025-06-27 LAB
ALBUMIN SERPL-MCNC: 3.3 G/DL (ref 3.4–5)
ALBUMIN/GLOB SERPL: 0.9 {RATIO} (ref 1.1–2.2)
ALP SERPL-CCNC: 88 U/L (ref 40–129)
ALT SERPL-CCNC: 9 U/L (ref 10–40)
ANION GAP SERPL CALCULATED.3IONS-SCNC: 13 MMOL/L (ref 3–16)
AST SERPL-CCNC: 27 U/L (ref 15–37)
BASOPHILS # BLD: 0.1 K/UL (ref 0–0.2)
BASOPHILS NFR BLD: 0.5 %
BILIRUB SERPL-MCNC: <0.2 MG/DL (ref 0–1)
BUN SERPL-MCNC: 18 MG/DL (ref 7–20)
CALCIUM SERPL-MCNC: 9.1 MG/DL (ref 8.3–10.6)
CHLORIDE SERPL-SCNC: 99 MMOL/L (ref 99–110)
CO2 SERPL-SCNC: 25 MMOL/L (ref 21–32)
CREAT SERPL-MCNC: 0.6 MG/DL (ref 0.6–1.1)
DEPRECATED RDW RBC AUTO: 12.9 % (ref 12.4–15.4)
EOSINOPHIL # BLD: 0.1 K/UL (ref 0–0.6)
EOSINOPHIL NFR BLD: 1 %
GFR SERPLBLD CREATININE-BSD FMLA CKD-EPI: >90 ML/MIN/{1.73_M2}
GLUCOSE BLD-MCNC: 190 MG/DL (ref 70–99)
GLUCOSE BLD-MCNC: 195 MG/DL (ref 70–99)
GLUCOSE BLD-MCNC: 202 MG/DL (ref 70–99)
GLUCOSE BLD-MCNC: 91 MG/DL (ref 70–99)
GLUCOSE SERPL-MCNC: 135 MG/DL (ref 70–99)
HCT VFR BLD AUTO: 37.4 % (ref 36–48)
HGB BLD-MCNC: 12.6 G/DL (ref 12–16)
LYMPHOCYTES # BLD: 2.7 K/UL (ref 1–5.1)
LYMPHOCYTES NFR BLD: 20.2 %
MCH RBC QN AUTO: 29 PG (ref 26–34)
MCHC RBC AUTO-ENTMCNC: 33.8 G/DL (ref 31–36)
MCV RBC AUTO: 85.7 FL (ref 80–100)
MONOCYTES # BLD: 1.1 K/UL (ref 0–1.3)
MONOCYTES NFR BLD: 8.4 %
NEUTROPHILS # BLD: 9.3 K/UL (ref 1.7–7.7)
NEUTROPHILS NFR BLD: 69.9 %
PERFORMED ON: ABNORMAL
PERFORMED ON: NORMAL
PLATELET # BLD AUTO: 265 K/UL (ref 135–450)
PMV BLD AUTO: 8.1 FL (ref 5–10.5)
POTASSIUM SERPL-SCNC: 3.9 MMOL/L (ref 3.5–5.1)
PROT SERPL-MCNC: 6.9 G/DL (ref 6.4–8.2)
RBC # BLD AUTO: 4.36 M/UL (ref 4–5.2)
SODIUM SERPL-SCNC: 137 MMOL/L (ref 136–145)
WBC # BLD AUTO: 13.3 K/UL (ref 4–11)

## 2025-06-27 PROCEDURE — 1200000000 HC SEMI PRIVATE

## 2025-06-27 PROCEDURE — 36415 COLL VENOUS BLD VENIPUNCTURE: CPT

## 2025-06-27 PROCEDURE — 6370000000 HC RX 637 (ALT 250 FOR IP): Performed by: NURSE PRACTITIONER

## 2025-06-27 PROCEDURE — 94618 PULMONARY STRESS TESTING: CPT

## 2025-06-27 PROCEDURE — 85025 COMPLETE CBC W/AUTO DIFF WBC: CPT

## 2025-06-27 PROCEDURE — 6360000002 HC RX W HCPCS

## 2025-06-27 PROCEDURE — 6360000002 HC RX W HCPCS: Performed by: STUDENT IN AN ORGANIZED HEALTH CARE EDUCATION/TRAINING PROGRAM

## 2025-06-27 PROCEDURE — 94640 AIRWAY INHALATION TREATMENT: CPT

## 2025-06-27 PROCEDURE — 6370000000 HC RX 637 (ALT 250 FOR IP): Performed by: STUDENT IN AN ORGANIZED HEALTH CARE EDUCATION/TRAINING PROGRAM

## 2025-06-27 PROCEDURE — 71045 X-RAY EXAM CHEST 1 VIEW: CPT

## 2025-06-27 PROCEDURE — 6370000000 HC RX 637 (ALT 250 FOR IP)

## 2025-06-27 PROCEDURE — 80053 COMPREHEN METABOLIC PANEL: CPT

## 2025-06-27 PROCEDURE — 99233 SBSQ HOSP IP/OBS HIGH 50: CPT | Performed by: INTERNAL MEDICINE

## 2025-06-27 RX ORDER — OXYCODONE HYDROCHLORIDE 5 MG/1
10 TABLET ORAL EVERY 8 HOURS PRN
Refills: 0 | Status: DISCONTINUED | OUTPATIENT
Start: 2025-06-27 | End: 2025-06-28 | Stop reason: HOSPADM

## 2025-06-27 RX ORDER — HYDROMORPHONE HYDROCHLORIDE 2 MG/1
1 TABLET ORAL EVERY 4 HOURS PRN
Refills: 0 | Status: DISCONTINUED | OUTPATIENT
Start: 2025-06-27 | End: 2025-06-28 | Stop reason: HOSPADM

## 2025-06-27 RX ORDER — SODIUM CHLORIDE FOR INHALATION 0.9 %
3 VIAL, NEBULIZER (ML) INHALATION EVERY 8 HOURS PRN
Status: DISCONTINUED | OUTPATIENT
Start: 2025-06-27 | End: 2025-06-28 | Stop reason: HOSPADM

## 2025-06-27 RX ORDER — LEVALBUTEROL 1.25 MG/.5ML
1.25 SOLUTION, CONCENTRATE RESPIRATORY (INHALATION) EVERY 8 HOURS PRN
Status: DISCONTINUED | OUTPATIENT
Start: 2025-06-27 | End: 2025-06-28 | Stop reason: HOSPADM

## 2025-06-27 RX ORDER — POLYETHYLENE GLYCOL 3350 17 G
2 POWDER IN PACKET (EA) ORAL
Status: DISCONTINUED | OUTPATIENT
Start: 2025-06-27 | End: 2025-06-28 | Stop reason: HOSPADM

## 2025-06-27 RX ORDER — DIPHENHYDRAMINE HCL 25 MG
25 TABLET ORAL
Status: DISCONTINUED | OUTPATIENT
Start: 2025-06-27 | End: 2025-06-27

## 2025-06-27 RX ORDER — BUDESONIDE 0.25 MG/2ML
0.25 INHALANT ORAL 2 TIMES DAILY
Status: DISCONTINUED | OUTPATIENT
Start: 2025-06-27 | End: 2025-06-28 | Stop reason: HOSPADM

## 2025-06-27 RX ORDER — BENZONATATE 100 MG/1
100 CAPSULE ORAL 3 TIMES DAILY PRN
Status: DISCONTINUED | OUTPATIENT
Start: 2025-06-27 | End: 2025-06-28 | Stop reason: HOSPADM

## 2025-06-27 RX ORDER — GUAIFENESIN 600 MG/1
600 TABLET, EXTENDED RELEASE ORAL 2 TIMES DAILY
Status: DISCONTINUED | OUTPATIENT
Start: 2025-06-27 | End: 2025-06-28 | Stop reason: HOSPADM

## 2025-06-27 RX ORDER — SENNOSIDES 8.6 MG/1
1 TABLET ORAL NIGHTLY
Status: DISCONTINUED | OUTPATIENT
Start: 2025-06-27 | End: 2025-06-28 | Stop reason: HOSPADM

## 2025-06-27 RX ORDER — POLYETHYLENE GLYCOL 3350 17 G/17G
17 POWDER, FOR SOLUTION ORAL DAILY
Status: DISCONTINUED | OUTPATIENT
Start: 2025-06-27 | End: 2025-06-28 | Stop reason: HOSPADM

## 2025-06-27 RX ORDER — ARFORMOTEROL TARTRATE 15 UG/2ML
15 SOLUTION RESPIRATORY (INHALATION)
Status: DISCONTINUED | OUTPATIENT
Start: 2025-06-27 | End: 2025-06-28 | Stop reason: HOSPADM

## 2025-06-27 RX ORDER — HYDROMORPHONE HYDROCHLORIDE 1 MG/ML
0.5 INJECTION, SOLUTION INTRAMUSCULAR; INTRAVENOUS; SUBCUTANEOUS
Status: DISCONTINUED | OUTPATIENT
Start: 2025-06-27 | End: 2025-06-27

## 2025-06-27 RX ORDER — MECOBALAMIN 5000 MCG
5 TABLET,DISINTEGRATING ORAL
Status: DISCONTINUED | OUTPATIENT
Start: 2025-06-27 | End: 2025-06-28 | Stop reason: HOSPADM

## 2025-06-27 RX ADMIN — OXYCODONE 10 MG: 5 TABLET ORAL at 21:43

## 2025-06-27 RX ADMIN — HYDROMORPHONE HYDROCHLORIDE 1 MG: 2 TABLET ORAL at 15:15

## 2025-06-27 RX ADMIN — SENNOSIDES 8.6 MG: 8.6 TABLET, FILM COATED ORAL at 13:40

## 2025-06-27 RX ADMIN — ARFORMOTEROL TARTRATE 15 MCG: 15 SOLUTION RESPIRATORY (INHALATION) at 20:15

## 2025-06-27 RX ADMIN — BUDESONIDE 250 MCG: 0.25 SUSPENSION RESPIRATORY (INHALATION) at 20:15

## 2025-06-27 RX ADMIN — POLYETHYLENE GLYCOL 3350 17 G: 17 POWDER, FOR SOLUTION ORAL at 13:40

## 2025-06-27 RX ADMIN — GUAIFENESIN 600 MG: 600 TABLET, MULTILAYER, EXTENDED RELEASE ORAL at 15:13

## 2025-06-27 RX ADMIN — NICOTINE POLACRILEX 2 MG: 2 LOZENGE ORAL at 15:13

## 2025-06-27 RX ADMIN — OXYCODONE 10 MG: 5 TABLET ORAL at 13:40

## 2025-06-27 RX ADMIN — BENZONATATE 100 MG: 100 CAPSULE ORAL at 15:13

## 2025-06-27 RX ADMIN — INSULIN LISPRO 2 UNITS: 100 INJECTION, SOLUTION INTRAVENOUS; SUBCUTANEOUS at 08:49

## 2025-06-27 RX ADMIN — ENOXAPARIN SODIUM 40 MG: 100 INJECTION SUBCUTANEOUS at 08:49

## 2025-06-27 RX ADMIN — HYDROMORPHONE HYDROCHLORIDE 1 MG: 2 TABLET ORAL at 10:13

## 2025-06-27 RX ADMIN — OXYCODONE 10 MG: 5 TABLET ORAL at 04:46

## 2025-06-27 RX ADMIN — ACETAMINOPHEN 650 MG: 325 TABLET ORAL at 05:17

## 2025-06-27 RX ADMIN — HYDROMORPHONE HYDROCHLORIDE 1 MG: 2 TABLET ORAL at 20:12

## 2025-06-27 ASSESSMENT — ENCOUNTER SYMPTOMS
SHORTNESS OF BREATH: 0
COUGH: 1

## 2025-06-27 ASSESSMENT — PAIN DESCRIPTION - ONSET
ONSET: GRADUAL

## 2025-06-27 ASSESSMENT — PAIN SCALES - GENERAL
PAINLEVEL_OUTOF10: 5
PAINLEVEL_OUTOF10: 3
PAINLEVEL_OUTOF10: 8
PAINLEVEL_OUTOF10: 6
PAINLEVEL_OUTOF10: 10
PAINLEVEL_OUTOF10: 5
PAINLEVEL_OUTOF10: 8
PAINLEVEL_OUTOF10: 0
PAINLEVEL_OUTOF10: 7

## 2025-06-27 ASSESSMENT — PAIN - FUNCTIONAL ASSESSMENT
PAIN_FUNCTIONAL_ASSESSMENT: ACTIVITIES ARE NOT PREVENTED

## 2025-06-27 ASSESSMENT — PAIN DESCRIPTION - DESCRIPTORS
DESCRIPTORS: ACHING

## 2025-06-27 ASSESSMENT — PAIN DESCRIPTION - LOCATION
LOCATION: RIB CAGE;HEAD
LOCATION: RIB CAGE
LOCATION: HEAD
LOCATION: RIB CAGE;HEAD
LOCATION: HEAD
LOCATION: CHEST;RIB CAGE
LOCATION: RIB CAGE
LOCATION: RIB CAGE;HEAD

## 2025-06-27 ASSESSMENT — PAIN DESCRIPTION - ORIENTATION
ORIENTATION: MID
ORIENTATION: RIGHT
ORIENTATION: RIGHT
ORIENTATION: MID
ORIENTATION: MID

## 2025-06-27 ASSESSMENT — PAIN DESCRIPTION - PAIN TYPE
TYPE: ACUTE PAIN

## 2025-06-27 ASSESSMENT — PAIN DESCRIPTION - FREQUENCY
FREQUENCY: INTERMITTENT

## 2025-06-27 NOTE — PLAN OF CARE
Problem: Discharge Planning  Goal: Discharge to home or other facility with appropriate resources  Outcome: Progressing  Flowsheets (Taken 6/27/2025 1542)  Discharge to home or other facility with appropriate resources:   Identify discharge learning needs (meds, wound care, etc)   Identify barriers to discharge with patient and caregiver   Arrange for needed discharge resources and transportation as appropriate     Problem: Risk for Elopement  Goal: Patient will not exit the unit/facility without proper excort  Outcome: Progressing  Flowsheets (Taken 6/27/2025 1542)  Nursing Interventions for Elopement Risk:   Collaborate with family members/caregivers to mitigate the elopement risk   Collaborate with treatment team for nicotine replacement   Escort with two staff members if patient must leave the unit   Place patient in room far away from exits and stairways     Problem: Safety - Adult  Goal: Free from fall injury  Outcome: Progressing  Flowsheets (Taken 6/27/2025 1542)  Free From Fall Injury: Instruct family/caregiver on patient safety  Note: Pt is a Fall Risk. See King Fall Risk Score. Pt bed in low position and side rails up. Call light and belongings in reach. Pt encouraged to call for assistance. Will continue with hourly rounds for PO intake, pain needs, toileting, and repositioning as needed.        Problem: Pain  Goal: Verbalizes/displays adequate comfort level or baseline comfort level  Outcome: Progressing  Flowsheets (Taken 6/27/2025 1542)  Verbalizes/displays adequate comfort level or baseline comfort level:   Encourage patient to monitor pain and request assistance   Assess pain using appropriate pain scale   Administer analgesics based on type and severity of pain and evaluate response

## 2025-06-27 NOTE — PROGRESS NOTES
Pulmonary Progress Note    Patient Name: Angella Arciniega   Patient : 1970   Date: 2025   Admit Date: 2025     CC: Pneumothorax    Interval History  Episode of dyspnea overnight where pt began coughing and felt she could not breathe. States sx resolved without intervention.    This morning pt agitated at care both yesterday and overnight. States she does not want to stay over the weekend and feels her L sided chest pain and constipation were not being addressed properly.    Otherwise pt denies any dyspnea at rest in bed, but continues to endorse dry cough. Denies any fever, chills, diaphoresis. Does c/o L posterior chest pain exacerbated with breathing, but not bothering her too much at this time. Reports valve has continued to be active.     MEDICATIONS   amitriptyline  25 mg Oral Nightly    sodium chloride flush  5-40 mL IntraVENous 2 times per day    enoxaparin  40 mg SubCUTAneous Daily    insulin lispro  0-8 Units SubCUTAneous 4x Daily AC & HS       Continuous Infusions:   sodium chloride      dextrose         ROS  Review of Systems   Constitutional:  Negative for chills, diaphoresis and fever.   Respiratory:  Positive for cough. Negative for shortness of breath.    Cardiovascular:  Positive for chest pain. Negative for palpitations.       PHYSICAL EXAMINATION  /76   Pulse 75   Temp 98 °F (36.7 °C) (Oral)   Resp 19   Ht 1.702 m (5' 7\")   Wt 62.6 kg (138 lb 0.1 oz)   SpO2 90%   BMI 21.62 kg/m²   Physical Exam  Vitals and nursing note reviewed.   Constitutional:       General: She is not in acute distress.     Appearance: Normal appearance. She is normal weight. She is not ill-appearing or diaphoretic.   Cardiovascular:      Rate and Rhythm: Normal rate and regular rhythm.      Pulses: Normal pulses.      Heart sounds: Normal heart sounds. No murmur heard.     No friction rub. No gallop.   Pulmonary:      Effort: Pulmonary effort is normal. No respiratory distress.      Breath sounds:

## 2025-06-27 NOTE — PROGRESS NOTES
06/27/25 1130   Resting (Room Air)   SpO2 94   HR 68   During Walk (Room Air)   SpO2 91   HR 78   Walk/Assistance Device Ambulation   Rate of Dyspnea 2   Symptoms Dizziness;Shortness of breath   Comments Patient became very SOB with increased WOB during ambulation while needing to stop, sit and catch her breath. Pt was walked back to her room and walk was stopped. During walk oxygen stayed above or equal to 91%   During Walk (On O2)   Need Additional O2 Flow Rate Rows No   After Walk   SpO2 91   HR 82   O2 Flow Rate (l/min)   (Room Air)   Does the Patient Qualify for Home O2 No   Does the Patient Need Portable Oxygen Tanks No

## 2025-06-27 NOTE — PROGRESS NOTES
V2.0    [facility] Progress Note      Name:  Angella Arciniega /Age/Sex: 1970  (54 y.o. 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 This has never happened before. She was diagnosed with Lefft sided pneumothorax. Was provided with thora seal with subsequent improvement in SOB. Was then transferred to [facility] for further management.      Left side pneumothorax  SOB  Increasing cough  -Repeat Chest xray. Continue Tele  -Pulmonology consult. Discussed with Pulm. Will add daily x-rays. Considering consulting CTS.   -PT/OT.   -Pain regimen as ordered. Will add PO dilaudid.      COPD  Emphysema  -Nebs as ordered.      Hx of NSCLC in remission  -Follows outpatient with Oncology.      Possible Hx of previous stroke      T2DM  -Insulin as ordered.      Hx of smoking     Constipation  -Check KUB. Laxatives as ordered.     Diet ADULT DIET; Regular   DVT Prophylaxis [x] Lovenox, []  Heparin, [] SCDs, [] Ambulation,  [] Eliquis, [] Xarelto  [] Coumadin   Code Status Full Code   Disposition From: Home  Expected Disposition: TBD  Estimated Date of Discharge: TBD  Patient requires continued admission due to Pneumothorax.    Surrogate Decision Maker/ POA  On file.      Personally reviewed Lab Studies and Imaging     Discussed management of the case with case management and pulmonology.         Subjective:     Patient seen and evaluated at bedside.  Complaining of left-sided chest pain.    Review of Systems:      Pertinent positives and negatives discussed in

## 2025-06-27 NOTE — PLAN OF CARE
Problem: Chronic Conditions and Co-morbidities  Goal: Patient's chronic conditions and co-morbidity symptoms are monitored and maintained or improved  6/26/2025 2147 by Martina Wilcox RN  Outcome: Progressing  Flowsheets (Taken 6/26/2025 2147)  Care Plan - Patient's Chronic Conditions and Co-Morbidity Symptoms are Monitored and Maintained or Improved:   Monitor and assess patient's chronic conditions and comorbid symptoms for stability, deterioration, or improvement   Collaborate with multidisciplinary team to address chronic and comorbid conditions and prevent exacerbation or deterioration   Update acute care plan with appropriate goals if chronic or comorbid symptoms are exacerbated and prevent overall improvement and discharge  6/26/2025 2146 by Martina Wilcox RN  Outcome: Progressing  Flowsheets (Taken 6/26/2025 2146)  Care Plan - Patient's Chronic Conditions and Co-Morbidity Symptoms are Monitored and Maintained or Improved:   Monitor and assess patient's chronic conditions and comorbid symptoms for stability, deterioration, or improvement   Collaborate with multidisciplinary team to address chronic and comorbid conditions and prevent exacerbation or deterioration   Update acute care plan with appropriate goals if chronic or comorbid symptoms are exacerbated and prevent overall improvement and discharge  6/26/2025 1930 by Valentino España, RN  Outcome: Progressing     Problem: Chronic Conditions and Co-morbidities  Goal: Patient's chronic conditions and co-morbidity symptoms are monitored and maintained or improved  6/26/2025 2147 by Martina Wilcox RN  Outcome: Progressing  Flowsheets (Taken 6/26/2025 2147)  Care Plan - Patient's Chronic Conditions and Co-Morbidity Symptoms are Monitored and Maintained or Improved:   Monitor and assess patient's chronic conditions and comorbid symptoms for stability, deterioration, or improvement   Collaborate with multidisciplinary team to address chronic and comorbid  complex needs related to functional status, cognitive ability or social support system  6/26/2025 1930 by Valentino España, RN  Outcome: Progressing  Flowsheets (Taken 6/26/2025 1901)  Discharge to home or other facility with appropriate resources:   Identify barriers to discharge with patient and caregiver   Arrange for needed discharge resources and transportation as appropriate   Identify discharge learning needs (meds, wound care, etc)   Arrange for interpreters to assist at discharge as needed   Refer to discharge planning if patient needs post-hospital services based on physician order or complex needs related to functional status, cognitive ability or social support system     Problem: Risk for Elopement  Goal: Patient will not exit the unit/facility without proper excort  6/26/2025 2147 by Martina Wilcox, RN  Outcome: Progressing  Flowsheets (Taken 6/26/2025 2147)  Nursing Interventions for Elopement Risk:   Assist with personal care needs such as toileting, eating, dressing, as needed to reduce the risk of wandering   Collaborate with family members/caregivers to mitigate the elopement risk   Shoes and clothing collected and placed in gown attire   Reduce environmental triggers   Communicate/escalate to charge nurse the risk of elopement   Collaborate with treatment team for nicotine replacement  6/26/2025 2146 by Martina Wilcox, RN  Outcome: Progressing  Flowsheets (Taken 6/26/2025 2146)  Nursing Interventions for Elopement Risk:   Assist with personal care needs such as toileting, eating, dressing, as needed to reduce the risk of wandering   Collaborate with family members/caregivers to mitigate the elopement risk   Collaborate with treatment team for nicotine replacement   Communicate to physician the risk for elopement   Make sure patient has all necessary personal care items   Shoes and clothing collected and placed in gown attire   Reduce environmental triggers  6/26/2025 1930 by Valentino España,

## 2025-06-27 NOTE — CARE COORDINATION
Case Management Assessment  Initial Evaluation    Date/Time of Evaluation: 6/27/2025 7:51 AM  Assessment Completed by: Marie Daniel    If patient is discharged prior to next notation, then this note serves as note for discharge by case management.    Patient Name: Angella Arciniega                   YOB: 1970  Diagnosis: Pneumothorax [J93.9]                   Date / Time: 6/26/2025 11:37 AM    Patient Admission Status: Inpatient   Readmission Risk (Low < 19, Mod (19-27), High > 27): Readmission Risk Score: 8.8    Current PCP: Deejay Frazier MD  PCP verified by CM? Yes    Chart Reviewed: Yes      History Provided by: Patient  Patient Orientation: Alert and Oriented    Patient Cognition: Alert    Hospitalization in the last 30 days (Readmission):  No    If yes, Readmission Assessment in  Navigator will be completed.    Advance Directives:      Code Status: Full Code   Patient's Primary Decision Maker is: Legal Next of Kin      Discharge Planning:    Patient lives with: Other (Comment) (family) Type of Home: Trailer/Mobile Home  Primary Care Giver: Self  Patient Support Systems include: Family Members   Current Financial resources: Medicaid, Medicare  Current community resources: None  Current services prior to admission: None            Current DME:              Type of Home Care services:  OT, PT, Meals on Wheels    ADLS  Prior functional level: Independent in ADLs/IADLs  Current functional level: Independent in ADLs/IADLs    PT AM-PAC:   /24  OT AM-PAC:   /24    Family can provide assistance at DC: Yes  Would you like Case Management to discuss the discharge plan with any other family members/significant others, and if so, who? Yes  Plans to Return to Present Housing: Yes  Other Identified Issues/Barriers to RETURNING to current housing: TBD  Potential Assistance needed at discharge: Home Care            Potential DME:    Patient expects to discharge to: Trailer/mobile home  Plan for 
36

## 2025-06-28 ENCOUNTER — APPOINTMENT (OUTPATIENT)
Dept: GENERAL RADIOLOGY | Age: 55
DRG: 200 | End: 2025-06-28
Attending: INTERNAL MEDICINE
Payer: MEDICARE

## 2025-06-28 VITALS
WEIGHT: 138.01 LBS | BODY MASS INDEX: 21.66 KG/M2 | RESPIRATION RATE: 18 BRPM | TEMPERATURE: 98.7 F | SYSTOLIC BLOOD PRESSURE: 96 MMHG | HEART RATE: 74 BPM | DIASTOLIC BLOOD PRESSURE: 63 MMHG | OXYGEN SATURATION: 90 % | HEIGHT: 67 IN

## 2025-06-28 LAB
GLUCOSE BLD-MCNC: 126 MG/DL (ref 70–99)
GLUCOSE BLD-MCNC: 207 MG/DL (ref 70–99)
PERFORMED ON: ABNORMAL
PERFORMED ON: ABNORMAL

## 2025-06-28 PROCEDURE — 6370000000 HC RX 637 (ALT 250 FOR IP): Performed by: STUDENT IN AN ORGANIZED HEALTH CARE EDUCATION/TRAINING PROGRAM

## 2025-06-28 PROCEDURE — 2700000000 HC OXYGEN THERAPY PER DAY

## 2025-06-28 PROCEDURE — 94761 N-INVAS EAR/PLS OXIMETRY MLT: CPT

## 2025-06-28 PROCEDURE — 99233 SBSQ HOSP IP/OBS HIGH 50: CPT | Performed by: INTERNAL MEDICINE

## 2025-06-28 PROCEDURE — 71045 X-RAY EXAM CHEST 1 VIEW: CPT

## 2025-06-28 PROCEDURE — 94640 AIRWAY INHALATION TREATMENT: CPT

## 2025-06-28 PROCEDURE — 6370000000 HC RX 637 (ALT 250 FOR IP)

## 2025-06-28 PROCEDURE — 6360000002 HC RX W HCPCS

## 2025-06-28 RX ADMIN — LEVALBUTEROL 1.25 MG: 1.25 SOLUTION, CONCENTRATE RESPIRATORY (INHALATION) at 09:21

## 2025-06-28 RX ADMIN — GUAIFENESIN 600 MG: 600 TABLET, MULTILAYER, EXTENDED RELEASE ORAL at 08:33

## 2025-06-28 RX ADMIN — INSULIN LISPRO 2 UNITS: 100 INJECTION, SOLUTION INTRAVENOUS; SUBCUTANEOUS at 12:09

## 2025-06-28 RX ADMIN — TIOTROPIUM BROMIDE INHALATION SPRAY 5 MCG: 3.12 SPRAY, METERED RESPIRATORY (INHALATION) at 09:21

## 2025-06-28 RX ADMIN — BUDESONIDE 250 MCG: 0.25 SUSPENSION RESPIRATORY (INHALATION) at 09:21

## 2025-06-28 RX ADMIN — OXYCODONE 10 MG: 5 TABLET ORAL at 08:33

## 2025-06-28 RX ADMIN — ARFORMOTEROL TARTRATE 15 MCG: 15 SOLUTION RESPIRATORY (INHALATION) at 09:21

## 2025-06-28 RX ADMIN — HYDROMORPHONE HYDROCHLORIDE 1 MG: 2 TABLET ORAL at 01:27

## 2025-06-28 ASSESSMENT — PAIN SCALES - GENERAL
PAINLEVEL_OUTOF10: 8
PAINLEVEL_OUTOF10: 7

## 2025-06-28 ASSESSMENT — PAIN DESCRIPTION - LOCATION: LOCATION: RIB CAGE

## 2025-06-28 ASSESSMENT — PAIN DESCRIPTION - ORIENTATION: ORIENTATION: MID

## 2025-06-28 ASSESSMENT — PAIN DESCRIPTION - FREQUENCY: FREQUENCY: INTERMITTENT

## 2025-06-28 ASSESSMENT — PAIN - FUNCTIONAL ASSESSMENT: PAIN_FUNCTIONAL_ASSESSMENT: ACTIVITIES ARE NOT PREVENTED

## 2025-06-28 ASSESSMENT — PAIN DESCRIPTION - PAIN TYPE: TYPE: ACUTE PAIN

## 2025-06-28 ASSESSMENT — PAIN DESCRIPTION - ONSET: ONSET: GRADUAL

## 2025-06-28 ASSESSMENT — PAIN DESCRIPTION - DESCRIPTORS: DESCRIPTORS: ACHING

## 2025-06-28 NOTE — PROGRESS NOTES
[facility]   PULMONARY AND CRITICAL CARE MEDICINE    PULMONARY MEDICINE  PROGRESS NOTE     CC: spontaneous PTX s/p Heimlich valve    SUBJECTIVE / INTERVAL HISTORY:  Doing ok overall, quite upset about her surgical situation. Wanted to be transferred to [facility] again.     ROS:  Denies headache, nausea or chest pain.    24HR INTAKE/OUTPUT:    Intake/Output Summary (Last 24 hours) at 2025 0849  Last data filed at 2025 0400  Gross per 24 hour   Intake 472 ml   Output 0 ml   Net 472 ml        amitriptyline  25 mg Oral Nightly    polyethylene glycol  17 g Oral Daily    senna  1 tablet Oral Nightly    budesonide  0.25 mg Nebulization BID    tiotropium  2 puff Inhalation Daily RT    guaiFENesin  600 mg Oral BID    arformoterol tartrate  15 mcg Nebulization BID RT    sodium chloride flush  5-40 mL IntraVENous 2 times per day    enoxaparin  40 mg SubCUTAneous Daily    insulin lispro  0-8 Units SubCUTAneous 4x Daily AC & HS       PHYSICAL EXAMINATION:  /74   Pulse 68   Temp 98.3 °F (36.8 °C) (Oral)   Resp 17   Ht 1.702 m (5' 7\")   Wt 62.6 kg (138 lb 0.1 oz)   SpO2 94%   BMI 21.62 kg/m²   CURRENT PULSE OXIMETRY:  SpO2: 94 %  24HR PULSE OXIMETRY RANGE:  SpO2  Av.5 %  Min: 91 %  Max: 94 %     Gen: no distress. Speaking in full sentences without accessory muscle use  HEENT: PERRL, EOMI, OP nl  Lung:  Diminished, thora-vent/heimlich in place on L chest.   CV: RRR without M/R/R  Abd: +BS, soft, NT/ND  Ext: No edema.    DATA  CBC:   Recent Labs     25  1511 25  0354   WBC 11.7* 13.3*   HGB 13.3 12.6   HCT 39.7 37.4   MCV 85.8 85.7    265     BMP:   Recent Labs     25  1511 25  0354   * 137   K 4.6 3.9   CL 95* 99   CO2 30 25   BUN 20 18   CREATININE 0.7 0.6     No results for input(s): \"PHART\", \"FJB6QIN\", \"PO2ART\" in the last 72 hours.  LIVER PROFILE:   Recent Labs     25  1511 25  0354   AST 13* 27   ALT 7* 9*   BILITOT 0.3 <0.2

## 2025-06-28 NOTE — DISCHARGE INSTR - COC
Continuity of Care Form    Patient Name: Angella Arciniega   :  1970  MRN:  [id number]    Admit date:  2025  Discharge date:  25    Code Status Order: Full Code   Advance Directives:     Admitting Physician:  Ángel Mathis MD  PCP: Deejay Frazier MD    Discharging Nurse: Elias COLLAZO  Discharging Hospital Unit/Room#: 4301/4301-01  Discharging Unit Phone Number: [phone]    Emergency Contact:   Extended Emergency Contact Information  Primary Emergency Contact: Terry Dowd  Home Phone: [phone]  Mobile Phone: [phone]  Relation: Child  Secondary Emergency Contact: Jess Dowd  Home Phone: [phone]  Relation: Child    Past Surgical History:  Past Surgical History:   Procedure Laterality Date    BACK SURGERY      CARDIAC DEFIBRILLATOR PLACEMENT      HYSTERECTOMY (CERVIX STATUS UNKNOWN)      LARYNGOSCOPY N/A 2023    DIRECT LARYNGOSCOPY MICROSCOPE OR TELESCOPE WITH BIOPSY AND TONSILLECTOMY OVER 12, ESOPHAGOSCOPY performed by Allan Garland [place] [facility] [place]    LUNG BIOPSY Left 2017       Immunization History:   There is no immunization history for the selected administration types on file for this patient.    Active Problems:  Patient Active Problem List   Diagnosis Code    Mood swings R45.86    Cervical lymphadenopathy R59.0    Tobacco abuse Z72.0    Lung mass R91.8    Mediastinal adenopathy R59.0    Iatrogenic pneumothorax J95.811    Atelectasis J98.11    Panlobular emphysema (HCC) J43.1    Chest pain R07.9    Type 2 diabetes mellitus with hyperglycemia, without long-term current use of insulin (HCC) E11.65    Dyspnea and respiratory abnormalities R06.00, R06.89    Hyperglycemia R73.9    Coronary artery disease involving native coronary artery of native heart with angina pectoris I25.119    Small cell carcinoma (HCC) C80.1    Ischemic cardiomyopathy I25.5    Chronic systolic congestive heart failure (HCC) I50.22    Abnormal ECG R94.31    Throat swelling R22.1    Acute  tonsillitis J03.90    Type 2 diabetes mellitus with hyperglycemia, with long-term current use of insulin (HCC) E11.65, Z79.4    H/O discectomy Z98.890    Uterine cancer (HCC) C55    H/O: hysterectomy Z90.710    Hypotension I95.9    Back pain M54.9    Hypoglycemia E16.2    Ovarian cancer (HCC) C56.9    History of TIA (transient ischemic attack) Z86.73    Painful diabetic neuropathy (HCC) E11.40    Gait disorder R26.9    Numbness and tingling in both hands R20.0, R20.2    TIA (transient ischemic attack) G45.9    Abnormal finding of blood chemistry, unspecified R79.9    Functional neurological symptom disorder with attacks or seizures F44.5    Pneumothorax J93.9       Isolation/Infection:   Isolation            No Isolation          Patient Infection Status    None to display         Nurse Assessment:  Last Vital Signs: BP 96/63   Pulse 74   Temp 98.7 °F (37.1 °C) (Oral)   Resp 18   Ht 1.702 m (5' 7\")   Wt 62.6 kg (138 lb 0.1 oz)   SpO2 90%   BMI 21.62 kg/m²     Last documented pain score (0-10 scale): Pain Level: 8  Last Weight:   Wt Readings from Last 1 Encounters:   06/26/25 62.6 kg (138 lb 0.1 oz)

## 2025-06-28 NOTE — PLAN OF CARE
Problem: Chronic Conditions and Co-morbidities  Goal: Patient's chronic conditions and co-morbidity symptoms are monitored and maintained or improved  Outcome: Progressing     Problem: Discharge Planning  Goal: Discharge to home or other facility with appropriate resources  6/28/2025 0255 by Erinn Smith RN  Outcome: Progressing     Problem: Risk for Elopement  Goal: Patient will not exit the unit/facility without proper excort  6/28/2025 0255 by Erinn Smith RN  Outcome: Progressing     Problem: Safety - Adult  Goal: Free from fall injury  6/28/2025 0255 by Erinn Smith RN  Outcome: Progressing     Problem: Pain  Goal: Verbalizes/displays adequate comfort level or baseline comfort level  6/28/2025 0255 by Erinn Smith RN  Outcome: Progressing     Problem: ABCDS Injury Assessment  Goal: Absence of physical injury  Outcome: Progressing

## 2025-06-28 NOTE — PROGRESS NOTES
[place]2.[place]    [facility] [place]      Name:  Angella Arciniega /Age/Sex: 1970  (54 y.o. 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 This has never happened before. She was diagnosed with Lefft sided pneumothorax. Was provided with thora seal with subsequent improvement in SOB. Was then transferred to [facility] for further management. Has persistent air leak. Pulmonology recommending CTS consult.      Left side pneumothorax  SOB  Increasing cough  -Repeat Chest xray. Continue Tele  -Pulmonology consult. Discussed with Pulm. Will add daily x-rays. Considering consulting CTS.   -PT/OT.   -Pain regimen as ordered.   -CTS consult.      COPD  Emphysema  -Nebs as ordered.      Hx of NSCLC in remission  -Follows outpatient with Oncology.      Possible Hx of previous stroke      T2DM  -Insulin as ordered.      Hx of smoking     Constipation  -Check KUB. Laxatives as ordered.     Diet ADULT DIET; Regular   DVT Prophylaxis [x] Lovenox, []  Heparin, [] SCDs, [] Ambulation,  [] Eliquis, [] Xarelto  [] Coumadin   Code Status Full Code   Disposition From: Home  Expected Disposition: TBD  Estimated Date of Discharge: TBD  Patient requires continued admission due to Pneumothorax.    Surrogate Decision Maker/ POA  On file.      Personally reviewed Lab Studies and Imaging     Discussed management of the case with case management and pulmonology.         Subjective:     Patient seen and evaluated at bedside. Complaining of persistent air leak.     Review of Systems:

## 2025-06-28 NOTE — PROGRESS NOTES
I received a call from the charge nurse on PCU informing me that this patient wanted to speak to the nursing supervisor. This RN went and spoke with the patient. The patient is very upset stating that  is a liar and she was transferred here for surgery, and that the  Told her today she will have to wait till Monday to see a Thoracic physician. Patient stated that she wants to be transferred to [facility].  Daughter Jess on speaker phone per patients phone and also stated to transfer her to [facility]. Informed hospitalist.

## 2025-06-28 NOTE — PROGRESS NOTES
Patient informed RN this AM she would like to transfer to [facility]. MD made aware.

## 2025-06-28 NOTE — PROGRESS NOTES
Patient has been accepted to [facility], bed 4026, [facility] transport at 1430. [phone] nurse report line.

## 2025-06-28 NOTE — DISCHARGE SUMMARY
21.62 kg/m²       Physical Exam:     General: NAD  Eyes: EOMI  ENT: neck supple  Cardiovascular: Regular rate.  Respiratory: Tenderness to auscultation left lung.   Gastrointestinal: Soft, non tender  Genitourinary: no suprapubic tenderness  Musculoskeletal: No edema  Skin: warm, dry  Neuro: Alert.  Psych: Mood appropriate.         Labs and Imaging   XR CHEST 1 VIEW  Result Date: 6/28/2025  EXAM: XR CHEST 1 VIEW. DATE: 6/28/2025 6:06 EDT. INDICATION: Pneumothorax. COMPARISON: 6/27/2025 TECHNIQUE: Standard per department protocol. FINDINGS: Medical devices: Left apical chest tube unchanged. Cardiomediastinal silhouette normal. Stable trace left apical pneumothorax. Stable subcutaneous emphysema left chest wall and neck. No consolidation. Osseous structures unremarkable.     Stable trace left apical pneumothorax in the setting of a small left chest tube. Electronically signed by Genaro Dobson MD    XR CHEST 1 VIEW  Result Date: 6/27/2025  XR CHEST 1 VIEW TECHNIQUE:  XR CHEST 1 VIEW CLINICAL  INDICATION: Pneumothorax. COMPARISON: 6/26/2025 FINDINGS: Left-sided chest tube as before without visible pneumothorax. Subcutaneous gas in the left axilla, upper chest. Left-sided cardiac pacemaker. Heart size normal. Lungs grossly clear.     Left-sided chest tube with subcutaneous emphysema. No visible pneumothorax. Electronically signed by Marc Carballo    XR CHEST 1 VIEW  Result Date: 6/26/2025  HISTORY: Pneumothorax. AP chest. COMPARISON: 4/18/2024. Left upper chest pacemaker in stable position. Normal heart size. No effusion or consolidation. Left upper chest small chest tube without measurable pneumothorax.     1. Chest tube in the left upper chest with no pneumothorax identified. 2. No acute abnormality otherwise. Electronically signed by Valentino Benedict    XR ABDOMEN (KUB) (SINGLE AP VIEW)  Result Date: 6/26/2025  EXAM: XR ABDOMEN (KUB) (SINGLE AP VIEW) INDICATION: Abdominal pain. Constipation. COMPARISON: CT dated  5/13/2025 and other prior studies FINDINGS: BOWEL GAS PATTERN: A non-obstructive bowel gas pattern is present. No free air is identified. Moderate stool burden throughout the colon. BONES AND SOFT TISSUES: No significant abnormality. No abnormal calcific density. OTHER: None.     Findings consistent with provided history of constipation. Electronically signed by Mario Burciaga      CBC:   Recent Labs     06/26/25  1511 06/27/25  0354   WBC 11.7* 13.3*   HGB 13.3 12.6    265     BMP:    Recent Labs     06/26/25  1511 06/27/25  0354   * 137   K 4.6 3.9   CL 95* 99   CO2 30 25   BUN 20 18   CREATININE 0.7 0.6   GLUCOSE 206* 135*     Hepatic:   Recent Labs     06/26/25  1511 06/27/25  0354   AST 13* 27   ALT 7* 9*   BILITOT 0.3 <0.2   ALKPHOS 94 88     Lipids:   Lab Results   Component Value Date/Time    CHOL 125 03/17/2019 04:59 AM    HDL 30 03/17/2019 04:59 AM    TRIG 182 03/17/2019 04:59 AM     Hemoglobin A1C:   Lab Results   Component Value Date/Time    LABA1C 15.3 10/25/2022 01:14 AM     TSH:   Lab Results   Component Value Date/Time    TSH 3.48 10/25/2022 01:14 AM     Troponin: No results found for: \"TROPONINT\"  Lactic Acid: No results for input(s): \"LACTA\" in the last 72 hours.  BNP: No results for input(s): \"PROBNP\" in the last 72 hours.  UA:  Lab Results   Component Value Date/Time    NITRU Negative 09/02/2019 05:30 PM    COLORU Yellow 09/02/2019 05:30 PM    PHUR 6.5 09/02/2019 05:30 PM    LABCAST Rare  Hyaline 09/20/2016 11:25 PM    WBCUA 10-20 09/02/2019 05:30 PM    RBCUA 20-50 09/02/2019 05:30 PM    MUCUS 3+ 09/20/2016 11:25 PM    YEAST Present 03/16/2019 04:18 PM    BACTERIA 2+ 09/20/2016 11:25 PM    CLARITYU SL CLOUDY 09/02/2019 05:30 PM    SPECGRAV >=1.030 11/09/2012 07:59 PM    LEUKOCYTESUR SMALL 09/02/2019 05:30 PM    UROBILINOGEN 0.2 09/02/2019 05:30 PM    BILIRUBINUR Negative 09/02/2019 05:30 PM    BILIRUBINUR NEG 11/09/2012 07:59 PM    BLOODU LARGE 09/02/2019 05:30 PM    GLUCOSEU

## 2025-07-11 NOTE — PROGRESS NOTES
Physician Progress Note      PATIENT:               RINA CISNEROS  CSN #:                  [id number]  :                       1970  ADMIT DATE:       2025 11:37 AM  DISCH DATE:        2025 2:45 PM  RESPONDING  PROVIDER #:        Jimmy Estrada MD          QUERY TEXT:    Please clarify if possible the relationship, if any, between the spontaneous   pneumothorax and the patients COPD and the patients history of radiation   treatments. 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, presented with Left sided pneumothorax    Pulmonary consultant note: \"Secondary spontaneous pneumothorax  COPD, not in exacerbation  Hx NSCLC around , in remission s/p chemo and   radiation\"  Options provided:  -- The spontaneous pneumothorax is likely related to COPD  -- The spontaneous pneumothorax is likely related to radiation treatments  -- Unrelated to each other  -- Other - I will add my own diagnosis  -- Disagree - Not applicable / Not valid  -- Disagree - Clinically unable to determine / Unknown  -- Refer to Clinical Documentation Reviewer    PROVIDER RESPONSE TEXT:    The spontaneous pneumothorax is likely related to COPD    Query created by: Fernanda Stout on 2025 2:25 PM      Electronically signed by:  Jimmy Estrada MD 2025 12:26 PM

## 2025-08-19 ENCOUNTER — TELEPHONE (OUTPATIENT)
Dept: NEUROLOGY | Age: 55
End: 2025-08-19

## (undated) DEVICE — Z CONVERTED USE 2271043 CONTAINER SPEC COLL 4OZ SCR ON LID PEEL PCH

## (undated) DEVICE — CODMAN® SURGICAL PATTIES 1/2" X 1" (1.27CM X 2.54CM): Brand: CODMAN®

## (undated) DEVICE — TUBING, SUCTION, 3/16" X 12', STRAIGHT: Brand: MEDLINE

## (undated) DEVICE — GLOVE SURG SZ 6 THK91MIL LTX FREE SYN POLYISOPRENE ANTI

## (undated) DEVICE — MAJOR BSIN SETUP PK

## (undated) DEVICE — SOLUTION IV 1000ML 0.9% SOD CHL

## (undated) DEVICE — COVER,MAYO STAND,XL,STERILE: Brand: MEDLINE

## (undated) DEVICE — BLADE ES ELASTOMERIC COAT INSUL DURABLE BEND UPTO 90DEG

## (undated) DEVICE — JEWISH HOSPITAL TURNOVER KIT: Brand: MEDLINE INDUSTRIES, INC.

## (undated) DEVICE — TUBING, SUCTION, 1/4" X 12', STRAIGHT: Brand: MEDLINE

## (undated) DEVICE — SPONGE,TONSIL,DBL STRNG,XRAY,MED,1",STRL: Brand: MEDLINE INDUSTRIES, INC.

## (undated) DEVICE — SECTO® SPONGE, TONSIL, MEDIUM, 1 IN DIAMETER, CORD LENGTH 10 IN, (5 EA/POUCH, 25 POUCHES/PK, 2 PK/BX): Brand: SYMMETRY SURGICAL

## (undated) DEVICE — BOWL MED L 32OZ PLAS W/ MOLD GRAD EZ OPN PEEL PCH

## (undated) DEVICE — GARMENT,MEDLINE,DVT,INT,CALF,MED, GEN2: Brand: MEDLINE

## (undated) DEVICE — GOWN,SIRUS,POLYRNF,BRTHSLV,XL,30/CS: Brand: MEDLINE

## (undated) DEVICE — KIT,ANTI FOG,W/SPONGE & FLUID,SOFT PACK: Brand: MEDLINE

## (undated) DEVICE — COAGULATOR SUCT 10FR L6IN HND FT SWCH VALLEYLAB

## (undated) DEVICE — YANKAUER,OPEN TIP,W/O VENT,STERILE: Brand: MEDLINE INDUSTRIES, INC.

## (undated) DEVICE — GLOVE,SURG,SENSICARE,ALOE,LF,PF,6: Brand: MEDLINE

## (undated) DEVICE — MEDICINE CUP, GRADUATED, STER: Brand: MEDLINE